# Patient Record
Sex: FEMALE | Race: BLACK OR AFRICAN AMERICAN | NOT HISPANIC OR LATINO | Employment: UNEMPLOYED | URBAN - METROPOLITAN AREA
[De-identification: names, ages, dates, MRNs, and addresses within clinical notes are randomized per-mention and may not be internally consistent; named-entity substitution may affect disease eponyms.]

---

## 2020-07-08 ENCOUNTER — HOSPITAL ENCOUNTER (INPATIENT)
Facility: HOSPITAL | Age: 45
LOS: 5 days | Discharge: HOME | DRG: 432 | End: 2020-07-13
Attending: EMERGENCY MEDICINE | Admitting: INTERNAL MEDICINE
Payer: MEDICAID

## 2020-07-08 DIAGNOSIS — N18.3 ACUTE RENAL FAILURE SUPERIMPOSED ON STAGE 3 CHRONIC KIDNEY DISEASE, UNSPECIFIED ACUTE RENAL FAILURE TYPE: ICD-10-CM

## 2020-07-08 DIAGNOSIS — N17.9 PRERENAL ACUTE RENAL FAILURE: ICD-10-CM

## 2020-07-08 DIAGNOSIS — J96.01 ACUTE RESPIRATORY FAILURE WITH HYPOXIA: ICD-10-CM

## 2020-07-08 DIAGNOSIS — K92.1 MELENA: ICD-10-CM

## 2020-07-08 DIAGNOSIS — N18.30 STAGE 3 CHRONIC KIDNEY DISEASE: ICD-10-CM

## 2020-07-08 DIAGNOSIS — B19.20 DECOMPENSATED HCV CIRRHOSIS: Primary | ICD-10-CM

## 2020-07-08 DIAGNOSIS — I85.11 SECONDARY ESOPHAGEAL VARICES WITH BLEEDING: ICD-10-CM

## 2020-07-08 DIAGNOSIS — G93.41 ENCEPHALOPATHY, METABOLIC: ICD-10-CM

## 2020-07-08 DIAGNOSIS — K74.69 DECOMPENSATED HCV CIRRHOSIS: Primary | ICD-10-CM

## 2020-07-08 DIAGNOSIS — E83.42 HYPOMAGNESEMIA: ICD-10-CM

## 2020-07-08 DIAGNOSIS — D64.9 SEVERE ANEMIA: ICD-10-CM

## 2020-07-08 DIAGNOSIS — D62 ACUTE BLOOD LOSS ANEMIA: ICD-10-CM

## 2020-07-08 DIAGNOSIS — N17.9 AKI (ACUTE KIDNEY INJURY): ICD-10-CM

## 2020-07-08 DIAGNOSIS — R79.89 ELEVATED BRAIN NATRIURETIC PEPTIDE (BNP) LEVEL: ICD-10-CM

## 2020-07-08 DIAGNOSIS — G93.40 ENCEPHALOPATHY: ICD-10-CM

## 2020-07-08 DIAGNOSIS — E87.8 ELECTROLYTE IMBALANCE: ICD-10-CM

## 2020-07-08 DIAGNOSIS — D68.9 COAGULOPATHY: ICD-10-CM

## 2020-07-08 DIAGNOSIS — E87.29 METABOLIC ACIDOSIS, INCREASED ANION GAP: ICD-10-CM

## 2020-07-08 DIAGNOSIS — F10.10 ALCOHOL ABUSE: Chronic | ICD-10-CM

## 2020-07-08 DIAGNOSIS — R06.02 SOB (SHORTNESS OF BREATH): ICD-10-CM

## 2020-07-08 DIAGNOSIS — N17.9 ACUTE RENAL FAILURE, UNSPECIFIED ACUTE RENAL FAILURE TYPE: ICD-10-CM

## 2020-07-08 DIAGNOSIS — N17.9 ACUTE RENAL FAILURE SUPERIMPOSED ON STAGE 3 CHRONIC KIDNEY DISEASE, UNSPECIFIED ACUTE RENAL FAILURE TYPE: ICD-10-CM

## 2020-07-08 DIAGNOSIS — J81.0 ACUTE PULMONARY EDEMA: ICD-10-CM

## 2020-07-08 DIAGNOSIS — K92.1 GASTROINTESTINAL HEMORRHAGE WITH MELENA: ICD-10-CM

## 2020-07-08 LAB
ALBUMIN SERPL BCP-MCNC: 2.2 G/DL (ref 3.5–5.2)
ALP SERPL-CCNC: 154 U/L (ref 55–135)
ALT SERPL W/O P-5'-P-CCNC: 44 U/L (ref 10–44)
ANION GAP SERPL CALC-SCNC: 23 MMOL/L (ref 8–16)
APTT BLDCRRT: 34.7 SEC (ref 21–32)
AST SERPL-CCNC: 234 U/L (ref 10–40)
BILIRUB SERPL-MCNC: 14.9 MG/DL (ref 0.1–1)
BILIRUB UR QL STRIP: ABNORMAL
BNP SERPL-MCNC: 2829 PG/ML (ref 0–99)
BUN SERPL-MCNC: 22 MG/DL (ref 6–20)
CALCIUM SERPL-MCNC: 9.2 MG/DL (ref 8.7–10.5)
CHLORIDE SERPL-SCNC: 103 MMOL/L (ref 95–110)
CLARITY UR: CLEAR
CO2 SERPL-SCNC: 8 MMOL/L (ref 23–29)
COLOR UR: YELLOW
CREAT SERPL-MCNC: 1.6 MG/DL (ref 0.5–1.4)
EST. GFR  (AFRICAN AMERICAN): 45 ML/MIN/1.73 M^2
EST. GFR  (NON AFRICAN AMERICAN): 39 ML/MIN/1.73 M^2
GLUCOSE SERPL-MCNC: 84 MG/DL (ref 70–110)
GLUCOSE UR QL STRIP: NEGATIVE
HGB UR QL STRIP: NEGATIVE
HIV 1+2 AB+HIV1 P24 AG SERPL QL IA: NEGATIVE
INR PPP: 2 (ref 0.8–1.2)
KETONES UR QL STRIP: ABNORMAL
LEUKOCYTE ESTERASE UR QL STRIP: NEGATIVE
NITRITE UR QL STRIP: NEGATIVE
PH UR STRIP: 6 [PH] (ref 5–8)
POTASSIUM SERPL-SCNC: 4 MMOL/L (ref 3.5–5.1)
PROT SERPL-MCNC: 9.2 G/DL (ref 6–8.4)
PROT UR QL STRIP: NEGATIVE
PROTHROMBIN TIME: 20.5 SEC (ref 9–12.5)
SARS-COV-2 RDRP RESP QL NAA+PROBE: NEGATIVE
SODIUM SERPL-SCNC: 134 MMOL/L (ref 136–145)
SP GR UR STRIP: 1.02 (ref 1–1.03)
TROPONIN I SERPL DL<=0.01 NG/ML-MCNC: 0.09 NG/ML (ref 0–0.03)
URN SPEC COLLECT METH UR: ABNORMAL
UROBILINOGEN UR STRIP-ACNC: >=8 EU/DL

## 2020-07-08 PROCEDURE — 85014 HEMATOCRIT: CPT

## 2020-07-08 PROCEDURE — 93005 ELECTROCARDIOGRAM TRACING: CPT

## 2020-07-08 PROCEDURE — 82272 OCCULT BLD FECES 1-3 TESTS: CPT

## 2020-07-08 PROCEDURE — 36600 WITHDRAWAL OF ARTERIAL BLOOD: CPT

## 2020-07-08 PROCEDURE — 36430 TRANSFUSION BLD/BLD COMPNT: CPT

## 2020-07-08 PROCEDURE — 82565 ASSAY OF CREATININE: CPT

## 2020-07-08 PROCEDURE — 82803 BLOOD GASES ANY COMBINATION: CPT

## 2020-07-08 PROCEDURE — 99291 CRITICAL CARE FIRST HOUR: CPT | Mod: 25

## 2020-07-08 PROCEDURE — 86901 BLOOD TYPING SEROLOGIC RH(D): CPT

## 2020-07-08 PROCEDURE — 83880 ASSAY OF NATRIURETIC PEPTIDE: CPT

## 2020-07-08 PROCEDURE — 84484 ASSAY OF TROPONIN QUANT: CPT

## 2020-07-08 PROCEDURE — 85610 PROTHROMBIN TIME: CPT

## 2020-07-08 PROCEDURE — 96374 THER/PROPH/DIAG INJ IV PUSH: CPT

## 2020-07-08 PROCEDURE — 93010 ELECTROCARDIOGRAM REPORT: CPT | Mod: ,,, | Performed by: INTERNAL MEDICINE

## 2020-07-08 PROCEDURE — 80053 COMPREHEN METABOLIC PANEL: CPT

## 2020-07-08 PROCEDURE — 82330 ASSAY OF CALCIUM: CPT

## 2020-07-08 PROCEDURE — 63600175 PHARM REV CODE 636 W HCPCS: Performed by: EMERGENCY MEDICINE

## 2020-07-08 PROCEDURE — 81003 URINALYSIS AUTO W/O SCOPE: CPT

## 2020-07-08 PROCEDURE — 85730 THROMBOPLASTIN TIME PARTIAL: CPT

## 2020-07-08 PROCEDURE — 93010 EKG 12-LEAD: ICD-10-PCS | Mod: ,,, | Performed by: INTERNAL MEDICINE

## 2020-07-08 PROCEDURE — 84295 ASSAY OF SERUM SODIUM: CPT

## 2020-07-08 PROCEDURE — U0002 COVID-19 LAB TEST NON-CDC: HCPCS

## 2020-07-08 PROCEDURE — 99900035 HC TECH TIME PER 15 MIN (STAT)

## 2020-07-08 PROCEDURE — 84132 ASSAY OF SERUM POTASSIUM: CPT

## 2020-07-08 PROCEDURE — 86920 COMPATIBILITY TEST SPIN: CPT

## 2020-07-08 PROCEDURE — 86703 HIV-1/HIV-2 1 RESULT ANTBDY: CPT

## 2020-07-08 PROCEDURE — 20000000 HC ICU ROOM

## 2020-07-08 RX ORDER — HYDROCODONE BITARTRATE AND ACETAMINOPHEN 500; 5 MG/1; MG/1
TABLET ORAL
Status: DISCONTINUED | OUTPATIENT
Start: 2020-07-09 | End: 2020-07-09

## 2020-07-08 RX ORDER — HYDROCODONE BITARTRATE AND ACETAMINOPHEN 500; 5 MG/1; MG/1
TABLET ORAL
Status: DISCONTINUED | OUTPATIENT
Start: 2020-07-09 | End: 2020-07-11

## 2020-07-08 RX ORDER — SPIRONOLACTONE 50 MG/1
50 TABLET, FILM COATED ORAL DAILY
COMMUNITY
End: 2020-08-04

## 2020-07-08 RX ORDER — PANTOPRAZOLE SODIUM 40 MG/1
40 TABLET, DELAYED RELEASE ORAL DAILY
COMMUNITY

## 2020-07-08 RX ORDER — SERTRALINE HYDROCHLORIDE 25 MG/1
25 TABLET, FILM COATED ORAL DAILY
COMMUNITY

## 2020-07-08 RX ORDER — FUROSEMIDE 40 MG/1
40 TABLET ORAL 2 TIMES DAILY
COMMUNITY
End: 2020-08-04

## 2020-07-08 RX ORDER — FOLIC ACID 1 MG/1
1 TABLET ORAL DAILY
COMMUNITY
End: 2020-07-20 | Stop reason: SDUPTHER

## 2020-07-08 RX ORDER — FUROSEMIDE 10 MG/ML
60 INJECTION INTRAMUSCULAR; INTRAVENOUS
Status: COMPLETED | OUTPATIENT
Start: 2020-07-08 | End: 2020-07-08

## 2020-07-08 RX ADMIN — FUROSEMIDE 60 MG: 10 INJECTION, SOLUTION INTRAMUSCULAR; INTRAVENOUS at 10:07

## 2020-07-08 NOTE — ED PROVIDER NOTES
SCRIBE #1 NOTE: I, Rafael Thomason, am scribing for, and in the presence of, Magdaleno Oviedo MD. I have scribed the HPI, ROS, and PEx.     SCRIBE #2 NOTE: I, Ember Peng/Odessa Vinson, am scribing for, and in the presence of,  Lenny Robert MD. I have scribed the remaining portions of the note not scribed by Scribe #1.      History     Chief Complaint   Patient presents with    Shortness of Breath     Weakness, history of ascites per EMS     Review of patient's allergies indicates:  No Known Allergies      History of Present Illness     HPI    2020, 6:47 PM  History obtained from the patient      History of Present Illness: John Wray is a 44 y.o. female patient with a PMHx of ascites, alcohol abuse, depression, hepatitis C, and HTN who presents to the Emergency Department for evaluation of shortness of breath which onset gradually 3 days PTA. EMS reports pt having a hx of ascites. Pt reports having dark black stool for the past 2 days. Symptoms are constant and moderate in severity. No mitigating factors reported. Pt reports sx exacerbation with exertion. Associated sxs include abdominal distention and generalized weakness. Patient denies any fever, chills, HA, dizziness, N/V, and all other sxs at this time. No prior Tx reported. No further complaints or concerns at this time.       Arrival mode: EMS    PCP: Primary Doctor No        Past Medical History:  Ascites    Past Surgical History:  Past Surgical History:   Procedure Laterality Date     SECTION      ESOPHAGOGASTRODUODENOSCOPY           Family History:  History reviewed. No pertinent family history.    Social History:  Social History     Tobacco Use    Smoking status: Current Every Day Smoker     Packs/day: 0.50   Substance and Sexual Activity    Alcohol use: Not Currently    Drug use: Never    Sexual activity: Unknown        Review of Systems     Review of Systems   Constitutional: Negative for chills and fever.   HENT: Negative for  sore throat.    Respiratory: Positive for shortness of breath. Negative for cough.    Cardiovascular: Negative for chest pain and leg swelling.   Gastrointestinal: Positive for abdominal distention. Negative for abdominal pain, diarrhea, nausea and vomiting.   Genitourinary: Negative for dysuria.   Musculoskeletal: Negative for back pain, neck pain and neck stiffness.   Skin: Negative for rash and wound.   Neurological: Positive for weakness (Generalized). Negative for dizziness, light-headedness, numbness and headaches.   Hematological: Does not bruise/bleed easily.   All other systems reviewed and are negative.     Physical Exam     Initial Vitals [07/08/20 1835]   BP Pulse Resp Temp SpO2   (!) 159/84 105 18 98 °F (36.7 °C) 97 %      MAP       --          Physical Exam  Nursing Notes and Vital Signs Reviewed.  Constitutional: Patient is in no acute distress. Well-developed and well-nourished.  Head: Atraumatic. Normocephalic.  Eyes: PERRL. EOM intact. Conjunctivae are not pale. No scleral icterus.  ENT: Mucous membranes are moist. Oropharynx is clear and symmetric.    Neck: Supple. Full ROM.  Cardiovascular: Regular rate. Regular rhythm. No murmurs, rubs, or gallops. Distal pulses are 2+ and symmetric.  Pulmonary/Chest: No respiratory distress. Clear to auscultation bilaterally. No wheezing or rales.  Abdominal: Ascitic and firm. There is no tenderness to palpation. No rebound or guarding.  Genitourinary: No CVA tenderness  Musculoskeletal: Moves all extremities. No obvious deformities. No edema. No calf tenderness.  Skin: Warm and dry.  Neurological:  Alert, awake, and appropriate.  Normal speech.  No acute focal neurological deficits are appreciated.  Psychiatric: Normal affect. Good eye contact. Appropriate in content.     ED Course   Critical Care    Date/Time: 7/8/2020 11:48 PM  Performed by: Lenny Robert MD  Authorized by: Lenny Robert MD   Direct patient critical care time: 15  minutes  Additional history critical care time: 5 minutes  Ordering / reviewing critical care time: 8 minutes  Documentation critical care time: 7 minutes  Consulting other physicians critical care time: 5 minutes  Consult with family critical care time: 5 minutes  Total critical care time (exclusive of procedural time) : 45 minutes  Critical care time was exclusive of separately billable procedures and treating other patients and teaching time.  Critical care was necessary to treat or prevent imminent or life-threatening deterioration of the following conditions: severe anemia and multi-organ failure.  Critical care was time spent personally by me on the following activities: blood draw for specimens, development of treatment plan with patient or surrogate, discussions with consultants, interpretation of cardiac output measurements, evaluation of patient's response to treatment, examination of patient, obtaining history from patient or surrogate, ordering and performing treatments and interventions, ordering and review of laboratory studies, ordering and review of radiographic studies, re-evaluation of patient's condition, review of old charts and pulse oximetry.        ED Vital Signs:  Vitals:    07/11/20 0305 07/11/20 0330 07/11/20 0400 07/11/20 0430   BP: (!) 127/56 (!) 129/56 (!) 126/54 (!) 129/54   Pulse: 64 65 64 66   Resp: (!) 22 (!) 22 (!) 22 (!) 22   Temp: 98.4 °F (36.9 °C)      TempSrc: Oral      SpO2: 100% 100% 100% 98%   Weight:       Height:        07/11/20 0442 07/11/20 0450 07/11/20 0500 07/11/20 0505   BP:   (!) 121/54    Pulse: 66 67 67 67   Resp: 17 (!) 22 (!) 22    Temp:       TempSrc:       SpO2: 98% 98% 98%    Weight:       Height:        07/11/20 0530 07/11/20 0600 07/11/20 0630 07/11/20 0705   BP: (!) 121/54 (!) 125/56 (!) 133/57 131/60   Pulse: 67 66 69 70   Resp: (!) 22 (!) 22 (!) 22 (!) 22   Temp:    100.3 °F (37.9 °C)   TempSrc:    Oral   SpO2: 99% 99% 99% 100%   Weight:  90.7 kg (199 lb  15.3 oz)     Height:        07/11/20 0715 07/11/20 0805 07/11/20 0905   BP:  (!) 151/67 (!) 146/64   Pulse: 69 82 89   Resp: (!) 22 16 (!) 23   Temp:      TempSrc:      SpO2: 100% 98% (!) 94%   Weight:      Height:          Abnormal Lab Results:  Labs Reviewed   COMPREHENSIVE METABOLIC PANEL - Abnormal; Notable for the following components:       Result Value    Sodium 134 (*)     CO2 8 (*)     BUN, Bld 22 (*)     Creatinine 1.6 (*)     Total Protein 9.2 (*)     Albumin 2.2 (*)     Total Bilirubin 14.9 (*)     Alkaline Phosphatase 154 (*)      (*)     Anion Gap 23 (*)     eGFR if  45 (*)     eGFR if non  39 (*)     All other components within normal limits    Narrative:      CO2  critical result(s) called and verbal readback obtained from   Tacho Rios RN by [email or web address] 07/08/2020 19:38   B-TYPE NATRIURETIC PEPTIDE - Abnormal; Notable for the following components:    BNP 2,829 (*)     All other components within normal limits   TROPONIN I - Abnormal; Notable for the following components:    Troponin I 0.092 (*)     All other components within normal limits   URINALYSIS, REFLEX TO URINE CULTURE - Abnormal; Notable for the following components:    Ketones, UA Trace (*)     Bilirubin (UA) 3+ (*)     Urobilinogen, UA >=8.0 (*)     All other components within normal limits    Narrative:     Specimen Source->Urine   PROTIME-INR - Abnormal; Notable for the following components:    Prothrombin Time 20.5 (*)     INR 2.0 (*)     All other components within normal limits   APTT - Abnormal; Notable for the following components:    aPTT 34.7 (*)     All other components within normal limits   OCCULT BLOOD X 1, STOOL - Abnormal; Notable for the following components:    Occult Blood Positive (*)     All other components within normal limits   HIV 1 / 2 ANTIBODY   SARS-COV-2 RNA AMPLIFICATION, QUAL   PATHOLOGIST INTERPRETATION DIFFERENTIAL   TYPE & SCREEN   PREPARE RBC SOFT   PREPARE FRESH FROZEN  PLASMA SOFT        All Lab Results:  Results for orders placed or performed during the hospital encounter of 07/08/20   Blood culture    Specimen: Blood   Result Value Ref Range    Blood Culture, Routine No Growth to date     Blood Culture, Routine No Growth to date    HIV 1/2 Ag/Ab (4th Gen)   Result Value Ref Range    HIV 1/2 Ag/Ab Negative Negative   Comprehensive metabolic panel   Result Value Ref Range    Sodium 134 (L) 136 - 145 mmol/L    Potassium 4.0 3.5 - 5.1 mmol/L    Chloride 103 95 - 110 mmol/L    CO2 8 (LL) 23 - 29 mmol/L    Glucose 84 70 - 110 mg/dL    BUN, Bld 22 (H) 6 - 20 mg/dL    Creatinine 1.6 (H) 0.5 - 1.4 mg/dL    Calcium 9.2 8.7 - 10.5 mg/dL    Total Protein 9.2 (H) 6.0 - 8.4 g/dL    Albumin 2.2 (L) 3.5 - 5.2 g/dL    Total Bilirubin 14.9 (H) 0.1 - 1.0 mg/dL    Alkaline Phosphatase 154 (H) 55 - 135 U/L     (H) 10 - 40 U/L    ALT 44 10 - 44 U/L    Anion Gap 23 (H) 8 - 16 mmol/L    eGFR if African American 45 (A) >60 mL/min/1.73 m^2    eGFR if non African American 39 (A) >60 mL/min/1.73 m^2   Brain natriuretic peptide   Result Value Ref Range    BNP 2,829 (H) 0 - 99 pg/mL   Troponin I   Result Value Ref Range    Troponin I 0.092 (H) 0.000 - 0.026 ng/mL   Urinalysis, Reflex to Urine Culture Urine, Clean Catch    Specimen: Urine   Result Value Ref Range    Specimen UA Urine, Catheterized     Color, UA Yellow Yellow, Straw, Margarita    Appearance, UA Clear Clear    pH, UA 6.0 5.0 - 8.0    Specific Gravity, UA 1.020 1.005 - 1.030    Protein, UA Negative Negative    Glucose, UA Negative Negative    Ketones, UA Trace (A) Negative    Bilirubin (UA) 3+ (A) Negative    Occult Blood UA Negative Negative    Nitrite, UA Negative Negative    Urobilinogen, UA >=8.0 (A) <2.0 EU/dL    Leukocytes, UA Negative Negative   COVID-19 Rapid Screening   Result Value Ref Range    SARS-CoV-2 RNA, Amplification, Qual Negative Negative   Protime-INR   Result Value Ref Range    Prothrombin Time 20.5 (H) 9.0 - 12.5 sec     INR 2.0 (H) 0.8 - 1.2   APTT   Result Value Ref Range    aPTT 34.7 (H) 21.0 - 32.0 sec   Occult blood x 1, stool   Result Value Ref Range    Occult Blood Positive (A) Negative   CBC auto differential   Result Value Ref Range    WBC 14.63 (H) 3.90 - 12.70 K/uL    RBC 1.12 (L) 4.00 - 5.40 M/uL    Hemoglobin 3.8 (LL) 12.0 - 16.0 g/dL    Hematocrit 12.9 (LL) 37.0 - 48.5 %    Mean Corpuscular Volume 115 (H) 82 - 98 fL    Mean Corpuscular Hemoglobin 33.9 (H) 27.0 - 31.0 pg    Mean Corpuscular Hemoglobin Conc 29.5 (L) 32.0 - 36.0 g/dL    RDW 23.9 (H) 11.5 - 14.5 %    Platelets 49 (L) 150 - 350 K/uL    MPV 10.9 9.2 - 12.9 fL    Immature Granulocytes 1.3 (H) 0.0 - 0.5 %    Gran # (ANC) 12.4 (H) 1.8 - 7.7 K/uL    Immature Grans (Abs) 0.19 (H) 0.00 - 0.04 K/uL    Lymph # 0.9 (L) 1.0 - 4.8 K/uL    Mono # 1.1 (H) 0.3 - 1.0 K/uL    Eos # 0.0 0.0 - 0.5 K/uL    Baso # 0.01 0.00 - 0.20 K/uL    nRBC 5 (A) 0 /100 WBC    Gran% 84.8 (H) 38.0 - 73.0 %    Lymph% 6.1 (L) 18.0 - 48.0 %    Mono% 7.6 4.0 - 15.0 %    Eosinophil% 0.1 0.0 - 8.0 %    Basophil% 0.1 0.0 - 1.9 %    Platelet Estimate Decreased (A)     Aniso Moderate     Poik Moderate     Poly Occasional     Hypo Marked     Target Cells Occasional     Zev Cells Occasional     Differential Method Automated    Magnesium   Result Value Ref Range    Magnesium 1.1 (L) 1.6 - 2.6 mg/dL   Phosphorus   Result Value Ref Range    Phosphorus 4.7 (H) 2.7 - 4.5 mg/dL   Comprehensive metabolic panel   Result Value Ref Range    Sodium 136 136 - 145 mmol/L    Potassium 4.5 3.5 - 5.1 mmol/L    Chloride 103 95 - 110 mmol/L    CO2 10 (L) 23 - 29 mmol/L    Glucose 93 70 - 110 mg/dL    BUN, Bld 25 (H) 6 - 20 mg/dL    Creatinine 2.1 (H) 0.5 - 1.4 mg/dL    Calcium 9.0 8.7 - 10.5 mg/dL    Total Protein 9.2 (H) 6.0 - 8.4 g/dL    Albumin 2.4 (L) 3.5 - 5.2 g/dL    Total Bilirubin 14.9 (H) 0.1 - 1.0 mg/dL    Alkaline Phosphatase 135 55 - 135 U/L     (H) 10 - 40 U/L    ALT 42 10 - 44 U/L    Anion Gap 23  (H) 8 - 16 mmol/L    eGFR if African American 33 (A) >60 mL/min/1.73 m^2    eGFR if non African American 28 (A) >60 mL/min/1.73 m^2   Ammonia   Result Value Ref Range    Ammonia 65 (H) 10 - 50 umol/L   Bilirubin, direct   Result Value Ref Range    Bilirubin, Direct 10.1 (H) 0.1 - 0.3 mg/dL   D dimer, quantitative   Result Value Ref Range    D-Dimer 12.25 (H) <0.50 mg/L FEU   Fibrinogen   Result Value Ref Range    Fibrinogen 157 (L) 182 - 366 mg/dL   Folate   Result Value Ref Range    Folate 4.0 4.0 - 24.0 ng/mL   Vitamin B12   Result Value Ref Range    Vitamin B-12 906 210 - 950 pg/mL   Protime-INR   Result Value Ref Range    Prothrombin Time 21.6 (H) 9.0 - 12.5 sec    INR 2.1 (H) 0.8 - 1.2   Ethanol   Result Value Ref Range    Alcohol, Medical, Serum <10 <10 mg/dL   Hepatitis B Surface Ab, Qualitative   Result Value Ref Range    Hep B S Ab Negative    Hepatitis panel, acute   Result Value Ref Range    Hepatitis B Surface Ag Negative Negative    Hep B C IgM Negative Negative    Hep A IgM Negative Negative    Hepatitis C Ab Negative Negative   Haptoglobin   Result Value Ref Range    Haptoglobin <10 (L) 30 - 250 mg/dL   TSH   Result Value Ref Range    TSH 2.277 0.400 - 4.000 uIU/mL   Hematocrit   Result Value Ref Range    Hematocrit 18.3 (LL) 37.0 - 48.5 %   Hemoglobin   Result Value Ref Range    Hemoglobin 6.6 (L) 12.0 - 16.0 g/dL   Basic metabolic panel   Result Value Ref Range    Sodium 136 136 - 145 mmol/L    Potassium 4.1 3.5 - 5.1 mmol/L    Chloride 103 95 - 110 mmol/L    CO2 19 (L) 23 - 29 mmol/L    Glucose 96 70 - 110 mg/dL    BUN, Bld 36 (H) 6 - 20 mg/dL    Creatinine 2.2 (H) 0.5 - 1.4 mg/dL    Calcium 9.3 8.7 - 10.5 mg/dL    Anion Gap 14 8 - 16 mmol/L    eGFR if African American 31 (A) >60 mL/min/1.73 m^2    eGFR if non African American 27 (A) >60 mL/min/1.73 m^2   Magnesium   Result Value Ref Range    Magnesium 1.9 1.6 - 2.6 mg/dL   Basic metabolic panel   Result Value Ref Range    Sodium 137 136 - 145  mmol/L    Potassium 4.0 3.5 - 5.1 mmol/L    Chloride 102 95 - 110 mmol/L    CO2 20 (L) 23 - 29 mmol/L    Glucose 117 (H) 70 - 110 mg/dL    BUN, Bld 42 (H) 6 - 20 mg/dL    Creatinine 2.4 (H) 0.5 - 1.4 mg/dL    Calcium 9.4 8.7 - 10.5 mg/dL    Anion Gap 15 8 - 16 mmol/L    eGFR if African American 28 (A) >60 mL/min/1.73 m^2    eGFR if non African American 24 (A) >60 mL/min/1.73 m^2   Magnesium   Result Value Ref Range    Magnesium 1.9 1.6 - 2.6 mg/dL   CBC auto differential   Result Value Ref Range    WBC 9.74 3.90 - 12.70 K/uL    RBC 2.13 (L) 4.00 - 5.40 M/uL    Hemoglobin 6.9 (L) 12.0 - 16.0 g/dL    Hematocrit 20.9 (L) 37.0 - 48.5 %    Mean Corpuscular Volume 98 82 - 98 fL    Mean Corpuscular Hemoglobin 32.4 (H) 27.0 - 31.0 pg    Mean Corpuscular Hemoglobin Conc 33.0 32.0 - 36.0 g/dL    RDW 22.8 (H) 11.5 - 14.5 %    Platelets 49 (L) 150 - 350 K/uL    MPV 11.0 9.2 - 12.9 fL    Immature Granulocytes 1.5 (H) 0.0 - 0.5 %    Gran # (ANC) 7.9 (H) 1.8 - 7.7 K/uL    Immature Grans (Abs) 0.15 (H) 0.00 - 0.04 K/uL    Lymph # 0.6 (L) 1.0 - 4.8 K/uL    Mono # 1.0 0.3 - 1.0 K/uL    Eos # 0.1 0.0 - 0.5 K/uL    Baso # 0.02 0.00 - 0.20 K/uL    nRBC 8 (A) 0 /100 WBC    Gran% 81.5 (H) 38.0 - 73.0 %    Lymph% 5.7 (L) 18.0 - 48.0 %    Mono% 9.8 4.0 - 15.0 %    Eosinophil% 1.3 0.0 - 8.0 %    Basophil% 0.2 0.0 - 1.9 %    Platelet Estimate Decreased (A)     Aniso Slight     Poik Moderate     Poly Occasional     Hypo Moderate     Target Cells Occasional     Tear Drop Cells Occasional     Zev Cells Occasional     Stomatocytes Present     Differential Method Automated    Magnesium   Result Value Ref Range    Magnesium 1.8 1.6 - 2.6 mg/dL   Phosphorus   Result Value Ref Range    Phosphorus 3.5 2.7 - 4.5 mg/dL   Protime-INR   Result Value Ref Range    Prothrombin Time 16.6 (H) 9.0 - 12.5 sec    INR 1.6 (H) 0.8 - 1.2   Basic metabolic panel   Result Value Ref Range    Sodium 137 136 - 145 mmol/L    Potassium 3.9 3.5 - 5.1 mmol/L    Chloride 102  95 - 110 mmol/L    CO2 20 (L) 23 - 29 mmol/L    Glucose 114 (H) 70 - 110 mg/dL    BUN, Bld 43 (H) 6 - 20 mg/dL    Creatinine 2.5 (H) 0.5 - 1.4 mg/dL    Calcium 9.5 8.7 - 10.5 mg/dL    Anion Gap 15 8 - 16 mmol/L    eGFR if African American 26 (A) >60 mL/min/1.73 m^2    eGFR if non African American 23 (A) >60 mL/min/1.73 m^2   Magnesium   Result Value Ref Range    Magnesium 1.9 1.6 - 2.6 mg/dL   CBC auto differential   Result Value Ref Range    WBC 8.86 3.90 - 12.70 K/uL    RBC 2.09 (L) 4.00 - 5.40 M/uL    Hemoglobin 6.7 (L) 12.0 - 16.0 g/dL    Hematocrit 20.6 (L) 37.0 - 48.5 %    Mean Corpuscular Volume 99 (H) 82 - 98 fL    Mean Corpuscular Hemoglobin 32.1 (H) 27.0 - 31.0 pg    Mean Corpuscular Hemoglobin Conc 32.5 32.0 - 36.0 g/dL    RDW 23.5 (H) 11.5 - 14.5 %    Platelets 59 (L) 150 - 350 K/uL    MPV 11.3 9.2 - 12.9 fL    Immature Granulocytes 1.6 (H) 0.0 - 0.5 %    Gran # (ANC) 7.2 1.8 - 7.7 K/uL    Immature Grans (Abs) 0.14 (H) 0.00 - 0.04 K/uL    Lymph # 0.7 (L) 1.0 - 4.8 K/uL    Mono # 0.7 0.3 - 1.0 K/uL    Eos # 0.2 0.0 - 0.5 K/uL    Baso # 0.03 0.00 - 0.20 K/uL    nRBC 8 (A) 0 /100 WBC    Gran% 81.2 (H) 38.0 - 73.0 %    Lymph% 7.4 (L) 18.0 - 48.0 %    Mono% 7.7 4.0 - 15.0 %    Eosinophil% 1.8 0.0 - 8.0 %    Basophil% 0.3 0.0 - 1.9 %    Platelet Estimate Decreased (A)     Aniso Slight     Poik Slight     Poly Occasional     Hypo Occasional     Ovalocytes Occasional     Target Cells Occasional     Tear Drop Cells Occasional     Differential Method Automated    Comprehensive metabolic panel   Result Value Ref Range    Sodium 137 136 - 145 mmol/L    Potassium 4.0 3.5 - 5.1 mmol/L    Chloride 103 95 - 110 mmol/L    CO2 20 (L) 23 - 29 mmol/L    Glucose 113 (H) 70 - 110 mg/dL    BUN, Bld 43 (H) 6 - 20 mg/dL    Creatinine 2.4 (H) 0.5 - 1.4 mg/dL    Calcium 9.2 8.7 - 10.5 mg/dL    Total Protein 9.2 (H) 6.0 - 8.4 g/dL    Albumin 2.8 (L) 3.5 - 5.2 g/dL    Total Bilirubin 19.8 (H) 0.1 - 1.0 mg/dL    Alkaline Phosphatase  122 55 - 135 U/L     (H) 10 - 40 U/L    ALT 44 10 - 44 U/L    Anion Gap 14 8 - 16 mmol/L    eGFR if African American 28 (A) >60 mL/min/1.73 m^2    eGFR if non African American 24 (A) >60 mL/min/1.73 m^2   Ammonia   Result Value Ref Range    Ammonia 152 (H) 10 - 50 umol/L   APTT   Result Value Ref Range    aPTT 30.0 21.0 - 32.0 sec   Haptoglobin   Result Value Ref Range    Haptoglobin 12 (L) 30 - 250 mg/dL   Hemoglobin   Result Value Ref Range    Hemoglobin 8.3 (L) 12.0 - 16.0 g/dL   Basic metabolic panel   Result Value Ref Range    Sodium 137 136 - 145 mmol/L    Potassium 3.5 3.5 - 5.1 mmol/L    Chloride 104 95 - 110 mmol/L    CO2 21 (L) 23 - 29 mmol/L    Glucose 120 (H) 70 - 110 mg/dL    BUN, Bld 47 (H) 6 - 20 mg/dL    Creatinine 2.0 (H) 0.5 - 1.4 mg/dL    Calcium 9.1 8.7 - 10.5 mg/dL    Anion Gap 12 8 - 16 mmol/L    eGFR if African American 34 (A) >60 mL/min/1.73 m^2    eGFR if non African American 30 (A) >60 mL/min/1.73 m^2   Magnesium   Result Value Ref Range    Magnesium 2.0 1.6 - 2.6 mg/dL   Sodium, urine, random   Result Value Ref Range    Sodium Urine Random 46 20 - 250 mmol/L   Creatinine, urine, random   Result Value Ref Range    Creatinine, Random Ur 84.2 15.0 - 325.0 mg/dL   Fibrinogen   Result Value Ref Range    Fibrinogen 306 182 - 366 mg/dL   Protime-INR   Result Value Ref Range    Prothrombin Time 16.5 (H) 9.0 - 12.5 sec    INR 1.6 (H) 0.8 - 1.2   CBC auto differential   Result Value Ref Range    WBC 8.63 3.90 - 12.70 K/uL    RBC 2.46 (L) 4.00 - 5.40 M/uL    Hemoglobin 7.7 (L) 12.0 - 16.0 g/dL    Hematocrit 23.5 (L) 37.0 - 48.5 %    Mean Corpuscular Volume 96 82 - 98 fL    Mean Corpuscular Hemoglobin 31.3 (H) 27.0 - 31.0 pg    Mean Corpuscular Hemoglobin Conc 32.8 32.0 - 36.0 g/dL    RDW 23.9 (H) 11.5 - 14.5 %    Platelets 53 (L) 150 - 350 K/uL    MPV 11.9 9.2 - 12.9 fL    Immature Granulocytes 1.7 (H) 0.0 - 0.5 %    Gran # (ANC) 7.0 1.8 - 7.7 K/uL    Immature Grans (Abs) 0.15 (H) 0.00 - 0.04  K/uL    Lymph # 0.4 (L) 1.0 - 4.8 K/uL    Mono # 0.9 0.3 - 1.0 K/uL    Eos # 0.2 0.0 - 0.5 K/uL    Baso # 0.02 0.00 - 0.20 K/uL    nRBC 5 (A) 0 /100 WBC    Gran% 81.0 (H) 38.0 - 73.0 %    Lymph% 4.9 (L) 18.0 - 48.0 %    Mono% 10.2 4.0 - 15.0 %    Eosinophil% 2.0 0.0 - 8.0 %    Basophil% 0.2 0.0 - 1.9 %    Differential Method Automated    CBC auto differential   Result Value Ref Range    WBC 8.63 3.90 - 12.70 K/uL    RBC 2.46 (L) 4.00 - 5.40 M/uL    Hemoglobin 7.7 (L) 12.0 - 16.0 g/dL    Hematocrit 23.5 (L) 37.0 - 48.5 %    Mean Corpuscular Volume 96 82 - 98 fL    Mean Corpuscular Hemoglobin 31.3 (H) 27.0 - 31.0 pg    Mean Corpuscular Hemoglobin Conc 32.8 32.0 - 36.0 g/dL    RDW 23.9 (H) 11.5 - 14.5 %    Platelets 53 (L) 150 - 350 K/uL    MPV 11.9 9.2 - 12.9 fL    Immature Granulocytes 1.7 (H) 0.0 - 0.5 %    Gran # (ANC) 7.0 1.8 - 7.7 K/uL    Immature Grans (Abs) 0.15 (H) 0.00 - 0.04 K/uL    Lymph # 0.4 (L) 1.0 - 4.8 K/uL    Mono # 0.9 0.3 - 1.0 K/uL    Eos # 0.2 0.0 - 0.5 K/uL    Baso # 0.02 0.00 - 0.20 K/uL    nRBC 5 (A) 0 /100 WBC    Gran% 81.0 (H) 38.0 - 73.0 %    Lymph% 4.9 (L) 18.0 - 48.0 %    Mono% 10.2 4.0 - 15.0 %    Eosinophil% 2.0 0.0 - 8.0 %    Basophil% 0.2 0.0 - 1.9 %    Differential Method Automated    Basic metabolic panel   Result Value Ref Range    Sodium 137 136 - 145 mmol/L    Potassium 3.7 3.5 - 5.1 mmol/L    Chloride 104 95 - 110 mmol/L    CO2 21 (L) 23 - 29 mmol/L    Glucose 121 (H) 70 - 110 mg/dL    BUN, Bld 48 (H) 6 - 20 mg/dL    Creatinine 2.2 (H) 0.5 - 1.4 mg/dL    Calcium 9.2 8.7 - 10.5 mg/dL    Anion Gap 12 8 - 16 mmol/L    eGFR if African American 31 (A) >60 mL/min/1.73 m^2    eGFR if non African American 27 (A) >60 mL/min/1.73 m^2   Magnesium   Result Value Ref Range    Magnesium 2.0 1.6 - 2.6 mg/dL   Hemoglobin   Result Value Ref Range    Hemoglobin 8.5 (L) 12.0 - 16.0 g/dL   Basic metabolic panel   Result Value Ref Range    Sodium 138 136 - 145 mmol/L    Potassium 3.3 (L) 3.5 - 5.1  mmol/L    Chloride 104 95 - 110 mmol/L    CO2 21 (L) 23 - 29 mmol/L    Glucose 105 70 - 110 mg/dL    BUN, Bld 52 (H) 6 - 20 mg/dL    Creatinine 2.2 (H) 0.5 - 1.4 mg/dL    Calcium 9.0 8.7 - 10.5 mg/dL    Anion Gap 13 8 - 16 mmol/L    eGFR if African American 31 (A) >60 mL/min/1.73 m^2    eGFR if non African American 27 (A) >60 mL/min/1.73 m^2   Magnesium   Result Value Ref Range    Magnesium 2.0 1.6 - 2.6 mg/dL   Protime-INR   Result Value Ref Range    Prothrombin Time 17.2 (H) 9.0 - 12.5 sec    INR 1.7 (H) 0.8 - 1.2   Hemoglobin   Result Value Ref Range    Hemoglobin 8.3 (L) 12.0 - 16.0 g/dL   Basic metabolic panel   Result Value Ref Range    Sodium 139 136 - 145 mmol/L    Potassium 3.4 (L) 3.5 - 5.1 mmol/L    Chloride 105 95 - 110 mmol/L    CO2 21 (L) 23 - 29 mmol/L    Glucose 93 70 - 110 mg/dL    BUN, Bld 54 (H) 6 - 20 mg/dL    Creatinine 2.2 (H) 0.5 - 1.4 mg/dL    Calcium 9.2 8.7 - 10.5 mg/dL    Anion Gap 13 8 - 16 mmol/L    eGFR if African American 31 (A) >60 mL/min/1.73 m^2    eGFR if non African American 27 (A) >60 mL/min/1.73 m^2   Magnesium   Result Value Ref Range    Magnesium 2.0 1.6 - 2.6 mg/dL   CBC auto differential   Result Value Ref Range    WBC 7.55 3.90 - 12.70 K/uL    RBC 2.60 (L) 4.00 - 5.40 M/uL    Hemoglobin 8.3 (L) 12.0 - 16.0 g/dL    Hematocrit 24.4 (L) 37.0 - 48.5 %    Mean Corpuscular Volume 94 82 - 98 fL    Mean Corpuscular Hemoglobin 31.9 (H) 27.0 - 31.0 pg    Mean Corpuscular Hemoglobin Conc 34.0 32.0 - 36.0 g/dL    RDW 22.8 (H) 11.5 - 14.5 %    Platelets 47 (L) 150 - 350 K/uL    MPV 10.9 9.2 - 12.9 fL    Immature Granulocytes 1.3 (H) 0.0 - 0.5 %    Gran # (ANC) 5.9 1.8 - 7.7 K/uL    Immature Grans (Abs) 0.10 (H) 0.00 - 0.04 K/uL    Lymph # 0.5 (L) 1.0 - 4.8 K/uL    Mono # 0.8 0.3 - 1.0 K/uL    Eos # 0.2 0.0 - 0.5 K/uL    Baso # 0.02 0.00 - 0.20 K/uL    nRBC 5 (A) 0 /100 WBC    Gran% 77.9 (H) 38.0 - 73.0 %    Lymph% 6.8 (L) 18.0 - 48.0 %    Mono% 11.1 4.0 - 15.0 %    Eosinophil% 2.6 0.0 -  8.0 %    Basophil% 0.3 0.0 - 1.9 %    Differential Method Automated    Comprehensive metabolic panel   Result Value Ref Range    Sodium 139 136 - 145 mmol/L    Potassium 3.4 (L) 3.5 - 5.1 mmol/L    Chloride 105 95 - 110 mmol/L    CO2 21 (L) 23 - 29 mmol/L    Glucose 93 70 - 110 mg/dL    BUN, Bld 55 (H) 6 - 20 mg/dL    Creatinine 2.2 (H) 0.5 - 1.4 mg/dL    Calcium 9.0 8.7 - 10.5 mg/dL    Total Protein 8.4 6.0 - 8.4 g/dL    Albumin 2.4 (L) 3.5 - 5.2 g/dL    Total Bilirubin 18.3 (H) 0.1 - 1.0 mg/dL    Alkaline Phosphatase 113 55 - 135 U/L     (H) 10 - 40 U/L    ALT 40 10 - 44 U/L    Anion Gap 13 8 - 16 mmol/L    eGFR if African American 31 (A) >60 mL/min/1.73 m^2    eGFR if non African American 28 (A) >60 mL/min/1.73 m^2   Ammonia   Result Value Ref Range    Ammonia 84 (H) 10 - 50 umol/L   Echo Color Flow Doppler? Yes; Bubble Contrast? No   Result Value Ref Range    BSA 1.99 m2    TDI SEPTAL 0.10 m/s    LV LATERAL E/E' RATIO 11.85 m/s    LV SEPTAL E/E' RATIO 15.40 m/s    LA WIDTH 5.68 cm    TDI LATERAL 0.13 m/s    LVIDD 4.27 3.5 - 6.0 cm    IVS 1.90 (A) 0.6 - 1.1 cm    PW 1.49 (A) 0.6 - 1.1 cm    Ao root annulus 3.24 cm    LVIDS 2.83 2.1 - 4.0 cm    FS 34 28 - 44 %    LA volume 132.32 cm3    Sinus 3.37 cm    STJ 3.28 cm    Ascending aorta 3.26 cm    LV mass 309.77 g    LA size 5.01 cm    RVDD 2.88 cm    TAPSE 2.31 cm    Left Ventricle Relative Wall Thickness 0.70 cm    AV mean gradient 10 mmHg    AV valve area 2.70 cm2    AV Velocity Ratio 0.71     AV index (prosthetic) 0.84     MV valve area p 1/2 method 3.37 cm2    E/A ratio 1.83     Mean e' 0.12 m/s    E wave decelartion time 224.94 msec    IVRT 34.25 msec    LVOT diameter 2.02 cm    LVOT area 3.2 cm2    LVOT peak tenisha 1.49 m/s    LVOT peak VTI 31.25 cm    Ao peak tenisha 2.10 m/s    Ao VTI 37.10 cm    RVOT peak tenisha 1.05 m/s    RVOT peak VTI 23.88 cm    LVOT stroke volume 100.10 cm3    AV peak gradient 18 mmHg    PV mean gradient 3.06 mmHg    E/E' ratio 13.39 m/s     MV Peak E Martinez 1.54 m/s    TR Max Martinez 3.56 m/s    MV stenosis pressure 1/2 time 65.23 ms    MV Peak A Martinez 0.84 m/s    LV Systolic Volume 30.43 mL    LV Systolic Volume Index 15.6 mL/m2    LV Diastolic Volume 81.72 mL    LV Diastolic Volume Index 41.78 mL/m2    LA Volume Index 67.6 mL/m2    LV Mass Index 158 g/m2    RA Major Axis 4.57 cm    Left Atrium Minor Axis 4.84 cm    Left Atrium Major Axis 6.29 cm    Triscuspid Valve Regurgitation Peak Gradient 51 mmHg    Right Atrial Pressure (from IVC) 3 mmHg    TV rest pulmonary artery pressure 54 mmHg   Type & Screen   Result Value Ref Range    Group & Rh B POS     Indirect Tevin NEG    Direct antiglobulin test   Result Value Ref Range    Direct Tevin (MILLIE) NEG    ISTAT PROCEDURE   Result Value Ref Range    POC PH 7.401 7.35 - 7.45    POC PCO2 33.7 (L) 35 - 45 mmHg    POC PO2 87 80 - 100 mmHg    POC HCO3 20.9 (L) 24 - 28 mmol/L    POC BE -4 -2 to 2 mmol/L    POC SATURATED O2 97 95 - 100 %    Rate 22     Sample ARTERIAL     Site RR     Allens Test Pass     DelSys Adult Vent     Mode AC/PRVC     Vt 400     PEEP 5     FiO2 55    Prepare RBC 1 Unit   Result Value Ref Range    UNIT NUMBER [id number]     Product Code [id number]     DISPENSE STATUS TRANSFUSED     CODING SYSTEM CENG101     Unit Blood Type Code 7300     Unit Blood Type B POS     Unit Expiration [id number]    Prepare Fresh Frozen Plasma 1 Unit   Result Value Ref Range    UNIT NUMBER [id number]     Product Code V4564Z77     DISPENSE STATUS TRANSFUSED     CODING SYSTEM HGUQ821     Unit Blood Type Code 7300     Unit Blood Type B POS     Unit Expiration 090279306608    Prepare RBC 1 Unit   Result Value Ref Range    UNIT NUMBER [id number]     Product Code M5316S81     DISPENSE STATUS TRANSFUSED     CODING SYSTEM BLWD602     Unit Blood Type Code 7300     Unit Blood Type B POS     Unit Expiration 964135331922    Prepare RBC 1 Unit   Result Value Ref Range    UNIT NUMBER [id number]     Product Code W2949S82      DISPENSE STATUS TRANSFUSED     CODING SYSTEM IMFI502     Unit Blood Type Code 7300     Unit Blood Type B POS     Unit Expiration 113069753648    Prepare Fresh Frozen Plasma 2 Units   Result Value Ref Range    UNIT NUMBER [id number]     Product Code O1509IS8     DISPENSE STATUS TRANSFUSED     CODING SYSTEM NSFX412     Unit Blood Type Code 7300     Unit Blood Type B POS     Unit Expiration 232835518026     UNIT NUMBER [id number]     Product Code E8974S07     DISPENSE STATUS TRANSFUSED     CODING SYSTEM CKJO245     Unit Blood Type Code 7300     Unit Blood Type B POS     Unit Expiration 541699920933    Prepare Platelets 1 Dose   Result Value Ref Range    UNIT NUMBER [id number]     Product Code D4594Z05     DISPENSE STATUS TRANSFUSED     CODING SYSTEM DDKM752     Unit Blood Type Code 9500     Unit Blood Type O NEG     Unit Expiration 657216564571    Prepare RBC 2 Units; transfuse   Result Value Ref Range    UNIT NUMBER [id number]     Product Code J5561S84     DISPENSE STATUS TRANSFUSED     CODING SYSTEM EHRS148     Unit Blood Type Code 9500     Unit Blood Type O NEG     Unit Expiration 672610081292     UNIT NUMBER [id number]     Product Code Y3965E69     DISPENSE STATUS TRANSFUSED     CODING SYSTEM EKFA732     Unit Blood Type Code 7300     Unit Blood Type B POS     Unit Expiration 083586947734    Prepare Fresh Frozen Plasma 4 Units   Result Value Ref Range    UNIT NUMBER [id number]     Product Code D9702PB1     DISPENSE STATUS TRANSFUSED     CODING SYSTEM DFSO513     Unit Blood Type Code 7300     Unit Blood Type B POS     Unit Expiration 078202019674     UNIT NUMBER [id number]     Product Code H5929AL5     DISPENSE STATUS TRANSFUSED     CODING SYSTEM BYFG955     Unit Blood Type Code 7300     Unit Blood Type B POS     Unit Expiration 380269138348     UNIT NUMBER [id number]     Product Code Y6773E47     DISPENSE STATUS TRANSFUSED     CODING SYSTEM OPJR163     Unit Blood Type Code 7300     Unit  Blood Type B POS     Unit Expiration 497579411061     UNIT NUMBER [id number]     Product Code Q6838U99     DISPENSE STATUS TRANSFUSED     CODING SYSTEM PNUS806     Unit Blood Type Code 7300     Unit Blood Type B POS     Unit Expiration 585254090952    Prepare RBC 1 Unit   Result Value Ref Range    UNIT NUMBER [id number]     Product Code V7694C97     DISPENSE STATUS TRANSFUSED     CODING SYSTEM PMWA999     Unit Blood Type Code 7300     Unit Blood Type B POS     Unit Expiration 293205850992    Prepare Cryoprecipitate 1 Dose   Result Value Ref Range    UNIT NUMBER [id number]     Product Code P2815U26     DISPENSE STATUS TRANSFUSED     CODING SYSTEM VVWD403     Unit Blood Type Code E000     Unit Blood Type X POS     Unit Expiration 711150338399        Imaging Results:  Imaging Results          X-Ray Chest AP Portable (Final result)  Result time 07/08/20 20:15:47    Final result by Raulito Jacques MD (07/08/20 20:15:47)                 Impression:      1.  Reticular and ground-glass interstitial changes present.  Tiny right effusion.  Cardiac silhouette size enlargement.  Interstitial infectious process such as atypical pneumonia difficult to exclude.  Other considerations would include interstitial pulmonary edema.    2.  Incidental findings as noted above.      Electronically signed by: Raulito Jacques MD  Date:    07/08/2020  Time:    20:15             Narrative:    EXAMINATION:  XR CHEST AP PORTABLE    CLINICAL HISTORY:  SOB;    COMPARISON:  No comparison studies are available.    FINDINGS:  EKG leads overlie the chest.  Oxygen tubing overlies the chest as well, which is rotated to the left.  Reticular and ground-glass interstitial changes throughout the lungs.  Possible tiny right effusion.  The lungs are otherwise clear.  The cardiac silhouette size is enlarged.  The trachea is midline and the mediastinal width is normal. Negative for left effusion or pneumothorax.  Pulmonary vasculature is congested.   Negative for osseous abnormalities. Tortuous aorta with calcifications of the aortic knob.  Degenerative changes of the spine with convex right curvature.                                 The EKG was ordered, reviewed, and independently interpreted by the ED provider.  Interpretation time: 1855  Rate: 103 BPM  Rhythm: sinus tachycardia  Interpretation: Possible left atrial enlargement. Low voltage QRS. Cannot rule out anteroseptal infarct, age undetermined. Prolonged QT. No STEMI.         The Emergency Provider reviewed the vital signs and test results, which are outlined above.     ED Discussion     8:00 PM: Dr. Oviedo transfers care of pt to Dr. Robert pending lab and radiology results.    11:47 PM: Dr. Robert evaluated pt. Walk test quickly desaturated to 75%.     11:50 PM: Lab reports they aren't able to run blood work after multiple attempts due to abnormal blood values. Suspect low Hgb, ordering transfusion of pRBCs and FFP and requesting stat pathology report.    11:55 PM: Discussed pt's case with Dr. Charly Lacey III (Gastroentology) who recommends medically resuscitate in ICU, transfuse blood products, then EGD.    12:50 AM: Discussed case with Dr. Saeed (Hospital Medicine). He agrees with current care and management of pt and accepts admission.   Admitting Service: Hospital Medicine  Admitting Physician: Dr. Holger Flower  Admit to: ICU    12:55 AM: Re-evaluated pt. I have discussed test results, shared treatment plan, and the need for admission with patient at bedside. Pt express understanding at this time and agree with all information. All questions answered. Pt has no further questions or concerns at this time. Pt is ready for admit.         Medical Decision Making:   Clinical Tests:   Lab Tests: Ordered and Reviewed  Radiological Study: Ordered and Reviewed  Medical Tests: Ordered and Reviewed           ED Medication(s):  Medications   0.9%  NaCl infusion (for blood administration) (has no  administration in time range)   ondansetron injection 4 mg (has no administration in time range)   morphine injection 2 mg (2 mg Intravenous Given 7/9/20 1344)   pantoprazole injection 40 mg (40 mg Intravenous Given 7/11/20 0856)   octreotide (SANDOSTATIN) 500 mcg in sodium chloride 0.9% 100 mL infusion (50 mcg/hr Intravenous Verify Only 7/11/20 0905)   nicotine 21 mg/24 hr 1 patch (1 patch Transdermal Patch Applied 7/11/20 0857)   mupirocin 2 % ointment ( Nasal Given 7/10/20 2012)   cefTRIAXone (ROCEPHIN) 2 g/50 mL D5W IVPB (2 g Intravenous New Bag 7/10/20 1453)   sodium chloride 0.9% flush 10 mL (has no administration in time range)   lactulose 10 gram/15 mL solution (enema) 200 g (200 g Rectal Given 7/11/20 0556)   furosemide injection 40 mg (has no administration in time range)   potassium chloride SA CR tablet 30 mEq (has no administration in time range)   furosemide injection 60 mg (60 mg Intravenous Given 7/8/20 2231)   phytonadione vitamin k (AQUA-MEPHYTON) 10 mg in dextrose 5 % 50 mL IVPB (10 mg Intravenous New Bag 7/9/20 0732)   magnesium sulfate 2g in water 50mL IVPB (premix) (2 g Intravenous New Bag 7/9/20 0847)   magnesium sulfate 2g in water 50mL IVPB (premix) (2 g Intravenous New Bag 7/9/20 1121)   pneumoc 13-ellen conj-dip cr(PF) (PREVNAR 13 (PF)) 0.5 mL (0.5 mLs Intramuscular Given 7/9/20 1355)   furosemide injection 80 mg (80 mg Intravenous Given 7/9/20 1518)   phytonadione vitamin k (AQUA-MEPHYTON) 10 mg in dextrose 5 % 50 mL IVPB (10 mg Intravenous New Bag 7/9/20 1843)   magnesium sulfate in dextrose IVPB (premix) 1 g (1 g Intravenous New Bag 7/10/20 1050)   lactulose 10 gram/15 mL solution (enema) 200 g (200 g Rectal Given 7/10/20 1209)   EPINEPHrine (ADRENALIN) 1 mg/mL (1 mL) injection (has no administration in time range)   propofoL (DIPRIVAN) 10 mg/mL infusion (has no administration in time range)   lidocaine (PF) 10 mg/ml (1%) 10 mg/mL (1 %) injection (has no administration in time range)    ketamine (KETALAR) 10 mg/mL injection (has no administration in time range)       Current Discharge Medication List                  Scribe Attestation:   Scribe #1: I performed the above scribed service and the documentation accurately describes the services I performed. I attest to the accuracy of the note.     Attending:   Physician Attestation Statement for Scribe #1: I, Magdaleno Oviedo MD, personally performed the services described in this documentation, as scribed by Rafael Thomason, in my presence, and it is both accurate and complete.       Scribe Attestation:   Scribe #2: I performed the above scribed service and the documentation accurately describes the services I performed. I attest to the accuracy of the note.    Attending Attestation:           Physician Attestation for Scribe:    Physician Attestation Statement for Scribe #2: I, Lenny Robert MD, reviewed documentation, as scribed by Ember Peng/Odessa Vinson in my presence, and it is both accurate and complete. I also acknowledge and confirm the content of the note done by Scribe #1.           Clinical Impression       ICD-10-CM ICD-9-CM   1. Decompensated HCV cirrhosis  B19.20 070.70    K74.69 571.5   2. SOB (shortness of breath)  R06.02 786.05   3. Acute renal failure, unspecified acute renal failure type  N17.9 584.9   4. Acute pulmonary edema  J81.0 518.4   5. Melena  K92.1 578.1   6. Elevated brain natriuretic peptide (BNP) level  R79.89 790.99   7. Coagulopathy  D68.9 286.9   8. Encephalopathy  G93.40 348.30   9. Gastrointestinal hemorrhage with melena  K92.1 578.1   10. Hypomagnesemia  E83.42 275.2   11. Metabolic acidosis, increased anion gap  E87.2 276.2   12. Severe anemia  D64.9 285.9   13. Acute blood loss anemia  D62 285.1   14. SUNG (acute kidney injury)  N17.9 584.9   15. Alcohol abuse  F10.10 305.00   16. Encephalopathy, metabolic  G93.41 348.31   17. Stage 3 chronic kidney disease  N18.3 585.3   18. Prerenal acute renal failure   N17.9 584.9       Disposition:   Disposition: Admitted  Condition: Critical       Lenny Robert MD  07/11/20 1008

## 2020-07-09 PROBLEM — K92.2 GIB (GASTROINTESTINAL BLEEDING): Status: ACTIVE | Noted: 2020-07-09

## 2020-07-09 PROBLEM — K74.69 DECOMPENSATED HCV CIRRHOSIS: Status: ACTIVE | Noted: 2020-07-09

## 2020-07-09 PROBLEM — K92.1 GASTROINTESTINAL HEMORRHAGE WITH MELENA: Status: ACTIVE | Noted: 2020-07-09

## 2020-07-09 PROBLEM — N17.9 AKI (ACUTE KIDNEY INJURY): Status: ACTIVE | Noted: 2020-07-09

## 2020-07-09 PROBLEM — B19.20 DECOMPENSATED HCV CIRRHOSIS: Status: ACTIVE | Noted: 2020-07-09

## 2020-07-09 PROBLEM — G93.40 ENCEPHALOPATHY: Status: ACTIVE | Noted: 2020-07-09

## 2020-07-09 PROBLEM — E87.29 METABOLIC ACIDOSIS, INCREASED ANION GAP: Status: ACTIVE | Noted: 2020-07-09

## 2020-07-09 PROBLEM — D62 ACUTE BLOOD LOSS ANEMIA: Status: ACTIVE | Noted: 2020-07-09

## 2020-07-09 PROBLEM — D64.9 SEVERE ANEMIA: Status: ACTIVE | Noted: 2020-07-09

## 2020-07-09 PROBLEM — E83.42 HYPOMAGNESEMIA: Status: ACTIVE | Noted: 2020-07-09

## 2020-07-09 PROBLEM — F10.10 ALCOHOL ABUSE: Chronic | Status: ACTIVE | Noted: 2020-07-09

## 2020-07-09 PROBLEM — K76.82 ACUTE HEPATIC ENCEPHALOPATHY: Status: ACTIVE | Noted: 2020-07-09

## 2020-07-09 PROBLEM — D68.9 COAGULOPATHY: Status: ACTIVE | Noted: 2020-07-09

## 2020-07-09 PROBLEM — G93.41 ENCEPHALOPATHY, METABOLIC: Status: ACTIVE | Noted: 2020-07-09

## 2020-07-09 LAB
ABO + RH BLD: NORMAL
ALBUMIN SERPL BCP-MCNC: 2.4 G/DL (ref 3.5–5.2)
ALP SERPL-CCNC: 135 U/L (ref 55–135)
ALT SERPL W/O P-5'-P-CCNC: 42 U/L (ref 10–44)
AMMONIA PLAS-SCNC: 65 UMOL/L (ref 10–50)
ANION GAP SERPL CALC-SCNC: 14 MMOL/L (ref 8–16)
ANION GAP SERPL CALC-SCNC: 23 MMOL/L (ref 8–16)
ANISOCYTOSIS BLD QL SMEAR: ABNORMAL
AORTIC ROOT ANNULUS: 3.24 CM
ASCENDING AORTA: 3.26 CM
AST SERPL-CCNC: 226 U/L (ref 10–40)
AV INDEX (PROSTH): 0.84
AV MEAN GRADIENT: 10 MMHG
AV PEAK GRADIENT: 18 MMHG
AV VALVE AREA: 2.7 CM2
AV VELOCITY RATIO: 0.71
BASOPHILS # BLD AUTO: 0.01 K/UL (ref 0–0.2)
BASOPHILS NFR BLD: 0.1 % (ref 0–1.9)
BILIRUB DIRECT SERPL-MCNC: 10.1 MG/DL (ref 0.1–0.3)
BILIRUB SERPL-MCNC: 14.9 MG/DL (ref 0.1–1)
BLD GP AB SCN CELLS X3 SERPL QL: NORMAL
BLD PROD TYP BPU: NORMAL
BLOOD UNIT EXPIRATION DATE: NORMAL
BLOOD UNIT TYPE CODE: 7300
BLOOD UNIT TYPE CODE: 9500
BLOOD UNIT TYPE: NORMAL
BSA FOR ECHO PROCEDURE: 1.99 M2
BUN SERPL-MCNC: 25 MG/DL (ref 6–20)
BUN SERPL-MCNC: 36 MG/DL (ref 6–20)
BURR CELLS BLD QL SMEAR: ABNORMAL
CALCIUM SERPL-MCNC: 9 MG/DL (ref 8.7–10.5)
CALCIUM SERPL-MCNC: 9.3 MG/DL (ref 8.7–10.5)
CHLORIDE SERPL-SCNC: 103 MMOL/L (ref 95–110)
CHLORIDE SERPL-SCNC: 103 MMOL/L (ref 95–110)
CO2 SERPL-SCNC: 10 MMOL/L (ref 23–29)
CO2 SERPL-SCNC: 19 MMOL/L (ref 23–29)
CODING SYSTEM: NORMAL
CREAT SERPL-MCNC: 2.1 MG/DL (ref 0.5–1.4)
CREAT SERPL-MCNC: 2.2 MG/DL (ref 0.5–1.4)
CV ECHO LV RWT: 0.7 CM
D DIMER PPP IA.FEU-MCNC: 12.25 MG/L FEU
DIFFERENTIAL METHOD: ABNORMAL
DISPENSE STATUS: NORMAL
DOP CALC AO PEAK VEL: 2.1 M/S
DOP CALC AO VTI: 37.1 CM
DOP CALC LVOT AREA: 3.2 CM2
DOP CALC LVOT DIAMETER: 2.02 CM
DOP CALC LVOT PEAK VEL: 1.49 M/S
DOP CALC LVOT STROKE VOLUME: 100.1 CM3
DOP CALC RVOT PEAK VEL: 1.05 M/S
DOP CALC RVOT VTI: 23.88 CM
DOP CALCLVOT PEAK VEL VTI: 31.25 CM
E WAVE DECELERATION TIME: 224.94 MSEC
E/A RATIO: 1.83
E/E' RATIO: 13.39 M/S
ECHO LV POSTERIOR WALL: 1.49 CM (ref 0.6–1.1)
EOSINOPHIL # BLD AUTO: 0 K/UL (ref 0–0.5)
EOSINOPHIL NFR BLD: 0.1 % (ref 0–8)
ERYTHROCYTE [DISTWIDTH] IN BLOOD BY AUTOMATED COUNT: 23.9 % (ref 11.5–14.5)
EST. GFR  (AFRICAN AMERICAN): 31 ML/MIN/1.73 M^2
EST. GFR  (AFRICAN AMERICAN): 33 ML/MIN/1.73 M^2
EST. GFR  (NON AFRICAN AMERICAN): 27 ML/MIN/1.73 M^2
EST. GFR  (NON AFRICAN AMERICAN): 28 ML/MIN/1.73 M^2
ETHANOL SERPL-MCNC: <10 MG/DL
FIBRINOGEN PPP-MCNC: 157 MG/DL (ref 182–366)
FOLATE SERPL-MCNC: 4 NG/ML (ref 4–24)
FRACTIONAL SHORTENING: 34 % (ref 28–44)
GLUCOSE SERPL-MCNC: 93 MG/DL (ref 70–110)
GLUCOSE SERPL-MCNC: 96 MG/DL (ref 70–110)
HAPTOGLOB SERPL-MCNC: <10 MG/DL (ref 30–250)
HCT VFR BLD AUTO: 12.9 % (ref 37–48.5)
HCT VFR BLD AUTO: 18.3 % (ref 37–48.5)
HGB BLD-MCNC: 3.8 G/DL (ref 12–16)
HGB BLD-MCNC: 6.6 G/DL (ref 12–16)
HYPOCHROMIA BLD QL SMEAR: ABNORMAL
IMM GRANULOCYTES # BLD AUTO: 0.19 K/UL (ref 0–0.04)
IMM GRANULOCYTES NFR BLD AUTO: 1.3 % (ref 0–0.5)
INR PPP: 2.1 (ref 0.8–1.2)
INTERVENTRICULAR SEPTUM: 1.9 CM (ref 0.6–1.1)
IVRT: 34.25 MSEC
LA MAJOR: 6.29 CM
LA MINOR: 4.84 CM
LA WIDTH: 5.68 CM
LEFT ATRIUM SIZE: 5.01 CM
LEFT ATRIUM VOLUME INDEX: 67.6 ML/M2
LEFT ATRIUM VOLUME: 132.32 CM3
LEFT INTERNAL DIMENSION IN SYSTOLE: 2.83 CM (ref 2.1–4)
LEFT VENTRICLE DIASTOLIC VOLUME INDEX: 41.78 ML/M2
LEFT VENTRICLE DIASTOLIC VOLUME: 81.72 ML
LEFT VENTRICLE MASS INDEX: 158 G/M2
LEFT VENTRICLE SYSTOLIC VOLUME INDEX: 15.6 ML/M2
LEFT VENTRICLE SYSTOLIC VOLUME: 30.43 ML
LEFT VENTRICULAR INTERNAL DIMENSION IN DIASTOLE: 4.27 CM (ref 3.5–6)
LEFT VENTRICULAR MASS: 309.77 G
LV LATERAL E/E' RATIO: 11.85 M/S
LV SEPTAL E/E' RATIO: 15.4 M/S
LYMPHOCYTES # BLD AUTO: 0.9 K/UL (ref 1–4.8)
LYMPHOCYTES NFR BLD: 6.1 % (ref 18–48)
MAGNESIUM SERPL-MCNC: 1.1 MG/DL (ref 1.6–2.6)
MAGNESIUM SERPL-MCNC: 1.9 MG/DL (ref 1.6–2.6)
MCH RBC QN AUTO: 33.9 PG (ref 27–31)
MCHC RBC AUTO-ENTMCNC: 29.5 G/DL (ref 32–36)
MCV RBC AUTO: 115 FL (ref 82–98)
MONOCYTES # BLD AUTO: 1.1 K/UL (ref 0.3–1)
MONOCYTES NFR BLD: 7.6 % (ref 4–15)
MV PEAK A VEL: 0.84 M/S
MV PEAK E VEL: 1.54 M/S
MV STENOSIS PRESSURE HALF TIME: 65.23 MS
MV VALVE AREA P 1/2 METHOD: 3.37 CM2
NEUTROPHILS # BLD AUTO: 12.4 K/UL (ref 1.8–7.7)
NEUTROPHILS NFR BLD: 84.8 % (ref 38–73)
NRBC BLD-RTO: 5 /100 WBC
NUM UNITS TRANS FFP: NORMAL
NUM UNITS TRANS PACKED RBC: NORMAL
OB PNL STL: POSITIVE
PHOSPHATE SERPL-MCNC: 4.7 MG/DL (ref 2.7–4.5)
PISA TR MAX VEL: 3.56 M/S
PLATELET # BLD AUTO: 49 K/UL (ref 150–350)
PLATELET BLD QL SMEAR: ABNORMAL
PMV BLD AUTO: 10.9 FL (ref 9.2–12.9)
POIKILOCYTOSIS BLD QL SMEAR: ABNORMAL
POLYCHROMASIA BLD QL SMEAR: ABNORMAL
POTASSIUM SERPL-SCNC: 4.1 MMOL/L (ref 3.5–5.1)
POTASSIUM SERPL-SCNC: 4.5 MMOL/L (ref 3.5–5.1)
PROT SERPL-MCNC: 9.2 G/DL (ref 6–8.4)
PROTHROMBIN TIME: 21.6 SEC (ref 9–12.5)
PV MEAN GRADIENT: 3.06 MMHG
RA MAJOR: 4.57 CM
RA PRESSURE: 3 MMHG
RBC # BLD AUTO: 1.12 M/UL (ref 4–5.4)
RIGHT VENTRICULAR END-DIASTOLIC DIMENSION: 2.88 CM
SINUS: 3.37 CM
SODIUM SERPL-SCNC: 136 MMOL/L (ref 136–145)
SODIUM SERPL-SCNC: 136 MMOL/L (ref 136–145)
STJ: 3.28 CM
TARGETS BLD QL SMEAR: ABNORMAL
TDI LATERAL: 0.13 M/S
TDI SEPTAL: 0.1 M/S
TDI: 0.12 M/S
TR MAX PG: 51 MMHG
TRANS PLATPHERESIS VOL PATIENT: NORMAL ML
TRICUSPID ANNULAR PLANE SYSTOLIC EXCURSION: 2.31 CM
TSH SERPL DL<=0.005 MIU/L-ACNC: 2.28 UIU/ML (ref 0.4–4)
TV REST PULMONARY ARTERY PRESSURE: 54 MMHG
VIT B12 SERPL-MCNC: 906 PG/ML (ref 210–950)
WBC # BLD AUTO: 14.63 K/UL (ref 3.9–12.7)

## 2020-07-09 PROCEDURE — 25000003 PHARM REV CODE 250: Performed by: PHYSICIAN ASSISTANT

## 2020-07-09 PROCEDURE — 63600175 PHARM REV CODE 636 W HCPCS: Performed by: NURSE PRACTITIONER

## 2020-07-09 PROCEDURE — 25000003 PHARM REV CODE 250: Performed by: NURSE PRACTITIONER

## 2020-07-09 PROCEDURE — 99291 CRITICAL CARE FIRST HOUR: CPT | Mod: ,,, | Performed by: NURSE PRACTITIONER

## 2020-07-09 PROCEDURE — 94761 N-INVAS EAR/PLS OXIMETRY MLT: CPT

## 2020-07-09 PROCEDURE — 83010 ASSAY OF HAPTOGLOBIN QUANT: CPT

## 2020-07-09 PROCEDURE — 97802 MEDICAL NUTRITION INDIV IN: CPT

## 2020-07-09 PROCEDURE — 85025 COMPLETE CBC W/AUTO DIFF WBC: CPT

## 2020-07-09 PROCEDURE — 83735 ASSAY OF MAGNESIUM: CPT

## 2020-07-09 PROCEDURE — P9016 RBC LEUKOCYTES REDUCED: HCPCS

## 2020-07-09 PROCEDURE — 90670 PCV13 VACCINE IM: CPT | Performed by: NURSE PRACTITIONER

## 2020-07-09 PROCEDURE — 85014 HEMATOCRIT: CPT

## 2020-07-09 PROCEDURE — 86706 HEP B SURFACE ANTIBODY: CPT

## 2020-07-09 PROCEDURE — P9035 PLATELET PHERES LEUKOREDUCED: HCPCS

## 2020-07-09 PROCEDURE — 85379 FIBRIN DEGRADATION QUANT: CPT

## 2020-07-09 PROCEDURE — 63600175 PHARM REV CODE 636 W HCPCS: Performed by: FAMILY MEDICINE

## 2020-07-09 PROCEDURE — S4991 NICOTINE PATCH NONLEGEND: HCPCS | Performed by: FAMILY MEDICINE

## 2020-07-09 PROCEDURE — 25000003 PHARM REV CODE 250: Performed by: INTERNAL MEDICINE

## 2020-07-09 PROCEDURE — 99233 SBSQ HOSP IP/OBS HIGH 50: CPT | Mod: ,,, | Performed by: INTERNAL MEDICINE

## 2020-07-09 PROCEDURE — 80074 ACUTE HEPATITIS PANEL: CPT

## 2020-07-09 PROCEDURE — 27000221 HC OXYGEN, UP TO 24 HOURS

## 2020-07-09 PROCEDURE — 80320 DRUG SCREEN QUANTALCOHOLS: CPT

## 2020-07-09 PROCEDURE — 83735 ASSAY OF MAGNESIUM: CPT | Mod: 91

## 2020-07-09 PROCEDURE — 85018 HEMOGLOBIN: CPT

## 2020-07-09 PROCEDURE — 87040 BLOOD CULTURE FOR BACTERIA: CPT

## 2020-07-09 PROCEDURE — 80053 COMPREHEN METABOLIC PANEL: CPT

## 2020-07-09 PROCEDURE — 63600175 PHARM REV CODE 636 W HCPCS: Performed by: PHYSICIAN ASSISTANT

## 2020-07-09 PROCEDURE — C9113 INJ PANTOPRAZOLE SODIUM, VIA: HCPCS | Performed by: FAMILY MEDICINE

## 2020-07-09 PROCEDURE — 90471 IMMUNIZATION ADMIN: CPT | Performed by: NURSE PRACTITIONER

## 2020-07-09 PROCEDURE — P9017 PLASMA 1 DONOR FRZ W/IN 8 HR: HCPCS

## 2020-07-09 PROCEDURE — 99291 PR CRITICAL CARE, E/M 30-74 MINUTES: ICD-10-PCS | Mod: ,,, | Performed by: NURSE PRACTITIONER

## 2020-07-09 PROCEDURE — 25000003 PHARM REV CODE 250: Performed by: FAMILY MEDICINE

## 2020-07-09 PROCEDURE — 82140 ASSAY OF AMMONIA: CPT

## 2020-07-09 PROCEDURE — 80048 BASIC METABOLIC PNL TOTAL CA: CPT

## 2020-07-09 PROCEDURE — 84443 ASSAY THYROID STIM HORMONE: CPT

## 2020-07-09 PROCEDURE — C9113 INJ PANTOPRAZOLE SODIUM, VIA: HCPCS | Performed by: INTERNAL MEDICINE

## 2020-07-09 PROCEDURE — 99291 PR CRITICAL CARE, E/M 30-74 MINUTES: ICD-10-PCS | Mod: ,,, | Performed by: INTERNAL MEDICINE

## 2020-07-09 PROCEDURE — 20000000 HC ICU ROOM

## 2020-07-09 PROCEDURE — 85610 PROTHROMBIN TIME: CPT

## 2020-07-09 PROCEDURE — 84100 ASSAY OF PHOSPHORUS: CPT

## 2020-07-09 PROCEDURE — 99291 CRITICAL CARE FIRST HOUR: CPT | Mod: ,,, | Performed by: INTERNAL MEDICINE

## 2020-07-09 PROCEDURE — 82746 ASSAY OF FOLIC ACID SERUM: CPT

## 2020-07-09 PROCEDURE — 82607 VITAMIN B-12: CPT

## 2020-07-09 PROCEDURE — 99233 PR SUBSEQUENT HOSPITAL CARE,LEVL III: ICD-10-PCS | Mod: ,,, | Performed by: INTERNAL MEDICINE

## 2020-07-09 PROCEDURE — 85397 CLOTTING FUNCT ACTIVITY: CPT

## 2020-07-09 PROCEDURE — 85384 FIBRINOGEN ACTIVITY: CPT

## 2020-07-09 PROCEDURE — 63600175 PHARM REV CODE 636 W HCPCS: Performed by: INTERNAL MEDICINE

## 2020-07-09 PROCEDURE — 84425 ASSAY OF VITAMIN B-1: CPT

## 2020-07-09 PROCEDURE — 36415 COLL VENOUS BLD VENIPUNCTURE: CPT

## 2020-07-09 PROCEDURE — 82248 BILIRUBIN DIRECT: CPT

## 2020-07-09 RX ORDER — MAGNESIUM SULFATE HEPTAHYDRATE 40 MG/ML
2 INJECTION, SOLUTION INTRAVENOUS ONCE
Status: COMPLETED | OUTPATIENT
Start: 2020-07-09 | End: 2020-07-09

## 2020-07-09 RX ORDER — OCTREOTIDE ACETATE 50 UG/ML
50 INJECTION, SOLUTION INTRAVENOUS; SUBCUTANEOUS ONCE
Status: DISCONTINUED | OUTPATIENT
Start: 2020-07-09 | End: 2020-07-09 | Stop reason: RX

## 2020-07-09 RX ORDER — FUROSEMIDE 10 MG/ML
80 INJECTION INTRAMUSCULAR; INTRAVENOUS ONCE
Status: COMPLETED | OUTPATIENT
Start: 2020-07-09 | End: 2020-07-09

## 2020-07-09 RX ORDER — SODIUM CHLORIDE 0.9 % (FLUSH) 0.9 %
10 SYRINGE (ML) INJECTION
Status: DISCONTINUED | OUTPATIENT
Start: 2020-07-09 | End: 2020-07-13 | Stop reason: HOSPADM

## 2020-07-09 RX ORDER — LACTULOSE 10 G/15ML
10 SOLUTION ORAL 3 TIMES DAILY
Status: DISCONTINUED | OUTPATIENT
Start: 2020-07-09 | End: 2020-07-10

## 2020-07-09 RX ORDER — PANTOPRAZOLE SODIUM 40 MG/10ML
40 INJECTION, POWDER, LYOPHILIZED, FOR SOLUTION INTRAVENOUS DAILY
Status: DISCONTINUED | OUTPATIENT
Start: 2020-07-09 | End: 2020-07-09

## 2020-07-09 RX ORDER — MUPIROCIN 20 MG/G
OINTMENT TOPICAL 2 TIMES DAILY
Status: DISCONTINUED | OUTPATIENT
Start: 2020-07-09 | End: 2020-07-13 | Stop reason: HOSPADM

## 2020-07-09 RX ORDER — PANTOPRAZOLE SODIUM 40 MG/10ML
40 INJECTION, POWDER, LYOPHILIZED, FOR SOLUTION INTRAVENOUS 2 TIMES DAILY
Status: DISCONTINUED | OUTPATIENT
Start: 2020-07-09 | End: 2020-07-13 | Stop reason: HOSPADM

## 2020-07-09 RX ORDER — ONDANSETRON 2 MG/ML
4 INJECTION INTRAMUSCULAR; INTRAVENOUS EVERY 8 HOURS PRN
Status: DISCONTINUED | OUTPATIENT
Start: 2020-07-09 | End: 2020-07-13 | Stop reason: HOSPADM

## 2020-07-09 RX ORDER — MORPHINE SULFATE 4 MG/ML
2 INJECTION, SOLUTION INTRAMUSCULAR; INTRAVENOUS EVERY 4 HOURS PRN
Status: DISCONTINUED | OUTPATIENT
Start: 2020-07-09 | End: 2020-07-12

## 2020-07-09 RX ORDER — LACTULOSE 10 G/15ML
20 SOLUTION ORAL 2 TIMES DAILY
Status: DISCONTINUED | OUTPATIENT
Start: 2020-07-09 | End: 2020-07-09

## 2020-07-09 RX ORDER — LACTULOSE 10 G/15ML
200 SOLUTION ORAL; RECTAL 3 TIMES DAILY
Status: DISCONTINUED | OUTPATIENT
Start: 2020-07-09 | End: 2020-07-09

## 2020-07-09 RX ORDER — IBUPROFEN 200 MG
1 TABLET ORAL DAILY
Status: DISCONTINUED | OUTPATIENT
Start: 2020-07-09 | End: 2020-07-13 | Stop reason: HOSPADM

## 2020-07-09 RX ADMIN — OCTREOTIDE ACETATE 50 MCG/HR: 500 INJECTION, SOLUTION INTRAVENOUS; SUBCUTANEOUS at 10:07

## 2020-07-09 RX ADMIN — LACTULOSE 10 G: 20 SOLUTION ORAL at 08:07

## 2020-07-09 RX ADMIN — OCTREOTIDE ACETATE 50 MCG/HR: 500 INJECTION, SOLUTION INTRAVENOUS; SUBCUTANEOUS at 01:07

## 2020-07-09 RX ADMIN — OCTREOTIDE ACETATE 50 MCG: 50 INJECTION, SOLUTION INTRAVENOUS; SUBCUTANEOUS at 09:07

## 2020-07-09 RX ADMIN — PHYTONADIONE 10 MG: 10 INJECTION, EMULSION INTRAMUSCULAR; INTRAVENOUS; SUBCUTANEOUS at 07:07

## 2020-07-09 RX ADMIN — PANTOPRAZOLE SODIUM 40 MG: 40 INJECTION, POWDER, LYOPHILIZED, FOR SOLUTION INTRAVENOUS at 08:07

## 2020-07-09 RX ADMIN — LORAZEPAM 2 MG: 2 INJECTION INTRAMUSCULAR; INTRAVENOUS at 08:07

## 2020-07-09 RX ADMIN — Medication 1 PATCH: at 11:07

## 2020-07-09 RX ADMIN — PANTOPRAZOLE SODIUM 40 MG: 40 INJECTION, POWDER, LYOPHILIZED, FOR SOLUTION INTRAVENOUS at 12:07

## 2020-07-09 RX ADMIN — MAGNESIUM SULFATE 2 G: 2 INJECTION INTRAVENOUS at 11:07

## 2020-07-09 RX ADMIN — LACTULOSE 20 G: 20 SOLUTION ORAL at 09:07

## 2020-07-09 RX ADMIN — PNEUMOCOCCAL 13-VALENT CONJUGATE VACCINE 0.5 ML: 2.2; 2.2; 2.2; 2.2; 2.2; 4.4; 2.2; 2.2; 2.2; 2.2; 2.2; 2.2; 2.2 INJECTION, SUSPENSION INTRAMUSCULAR at 01:07

## 2020-07-09 RX ADMIN — MUPIROCIN: 20 OINTMENT TOPICAL at 01:07

## 2020-07-09 RX ADMIN — PHYTONADIONE 10 MG: 10 INJECTION, EMULSION INTRAMUSCULAR; INTRAVENOUS; SUBCUTANEOUS at 06:07

## 2020-07-09 RX ADMIN — MUPIROCIN: 20 OINTMENT TOPICAL at 08:07

## 2020-07-09 RX ADMIN — MAGNESIUM SULFATE 2 G: 2 INJECTION INTRAVENOUS at 08:07

## 2020-07-09 RX ADMIN — PANTOPRAZOLE SODIUM 40 MG: 40 INJECTION, POWDER, LYOPHILIZED, FOR SOLUTION INTRAVENOUS at 09:07

## 2020-07-09 RX ADMIN — FUROSEMIDE 80 MG: 10 INJECTION, SOLUTION INTRAMUSCULAR; INTRAVENOUS at 03:07

## 2020-07-09 RX ADMIN — MORPHINE SULFATE 2 MG: 4 INJECTION, SOLUTION INTRAMUSCULAR; INTRAVENOUS at 01:07

## 2020-07-09 RX ADMIN — LORAZEPAM 2 MG: 2 INJECTION INTRAMUSCULAR; INTRAVENOUS at 03:07

## 2020-07-09 RX ADMIN — CEFTRIAXONE 2 G: 2 INJECTION, SOLUTION INTRAVENOUS at 01:07

## 2020-07-09 NOTE — ASSESSMENT & PLAN NOTE
Likely secondary to volume depletion, will continue to transfuse until hemoglobin greater than 7.  Nephrology following.    --management per Nephrology

## 2020-07-09 NOTE — ASSESSMENT & PLAN NOTE
Longstanding history of alcoholic liver disease and hepatitis.  Patient continues to drink alcohol, last drink few weeks ago (per patient).  GI consult.  Patient received Lasix 60 mg IV x1 in the ED.  Reports noncompliance with all medications, took medications many months ago.

## 2020-07-09 NOTE — ASSESSMENT & PLAN NOTE
Likely multifactorial. Will start start her on lactulose. Monitor neuro status closely.   See above.

## 2020-07-09 NOTE — ED NOTES
Patient sitting up in bed, no acute distress noted. Patient appears confused as seen talking to self, when asked, patient pointed into the air and stated she was talking to her daughter over there. Eyes, lips and finger nails appears jaundiced. Respirations even and unlabored on 3L NC. Abdomen round and distended. Alicea catheter in place with 150ml of urine. Patient updated on status and plan of care. Side rails up x 2, call light in reach, bed low and locked. Denies any needs at this time. Will continue to monitor.

## 2020-07-09 NOTE — ASSESSMENT & PLAN NOTE
AG acidosis. Likely due to lactic acidosis. Expect this to improve with blood transfusion/correction of anemia.

## 2020-07-09 NOTE — ASSESSMENT & PLAN NOTE
Secondary to cirrhosis of the liver, elevated INR and APTT on admission, negative for report of taking an anticoagulant.  Given 2 units FFP and 10 mg vitamin K overnight.    --daily CBC to monitor for bleeding

## 2020-07-09 NOTE — ASSESSMENT & PLAN NOTE
Severe symptomatic anemia.  Hemoglobin was undetectable initially one in the ED presentation, currently 3.8 after 1.5 units of PRBC transfusion.  Transfused total of 3 units PRBC.  Repeat H&H and may need further transfusion after that.  Closely monitor in the ICU.  Patient denies hematemesis.

## 2020-07-09 NOTE — ASSESSMENT & PLAN NOTE
Likely multifactorial: Anemia, Met acidosis, elevated ammonia and SUNG  Transfuse  Add Lactulose  Bicarb infusion  Follow up labs

## 2020-07-09 NOTE — SUBJECTIVE & OBJECTIVE
Oncology Treatment Plan:   [No treatment plan]    Medications:  Continuous Infusions:   octreotide (SANDOSTATIN) infusion       Scheduled Meds:   lactulose  200 g Rectal TID    lactulose  20 g Oral BID    magnesium sulfate IVPB  2 g Intravenous Once    mupirocin   Nasal BID    nicotine  1 patch Transdermal Daily    pantoprazole  40 mg Intravenous BID     PRN Meds:sodium chloride, sodium chloride, morphine, ondansetron, pneumoc 13-ellen conj-dip cr(PF)     Review of patient's allergies indicates:  No Known Allergies     Past Medical History:   Diagnosis Date    Alcohol abuse     Cirrhosis     Depression     Hypertension     Portal hypertension with esophageal varices      Past Surgical History:   Procedure Laterality Date     SECTION      ESOPHAGOGASTRODUODENOSCOPY       Family History     None        Tobacco Use    Smoking status: Current Every Day Smoker     Packs/day: 0.50   Substance and Sexual Activity    Alcohol use: Not Currently    Drug use: Never    Sexual activity: Not on file       Review of Systems   Reason unable to perform ROS: Patient confused and unable to answer questions appropriately.     Objective:     Vital Signs (Most Recent):  Temp: 98.3 °F (36.8 °C) (20 0721)  Pulse: 97 (20 1131)  Resp: (!) 22 (20 1131)  BP: (!) 150/61 (20 1130)  SpO2: 99 % (20 1131) Vital Signs (24h Range):  Temp:  [97.9 °F (36.6 °C)-98.6 °F (37 °C)] 98.3 °F (36.8 °C)  Pulse:  [] 97  Resp:  [18-37] 22  SpO2:  [96 %-100 %] 99 %  BP: (115-167)/() 150/61     Weight: 83.9 kg (185 lb)  Body mass index is 28.98 kg/m².  Body surface area is 1.99 meters squared.      Intake/Output Summary (Last 24 hours) at 2020 1247  Last data filed at 2020 1151  Gross per 24 hour   Intake 2174.02 ml   Output 155 ml   Net 2019.02 ml       Physical Exam  Vitals signs and nursing note reviewed. Exam conducted with a chaperone present (daughter).   Constitutional:       General:  She is not in acute distress.     Appearance: She is ill-appearing.   HENT:      Head: Normocephalic and atraumatic.      Right Ear: Hearing and external ear normal.      Left Ear: Hearing and external ear normal.      Nose: No rhinorrhea.      Right Sinus: No maxillary sinus tenderness or frontal sinus tenderness.      Left Sinus: No maxillary sinus tenderness or frontal sinus tenderness.      Mouth/Throat:      Mouth: No oral lesions.      Pharynx: Uvula midline.   Eyes:      General:         Right eye: No discharge.         Left eye: No discharge.      Conjunctiva/sclera: Conjunctivae normal.      Pupils: Pupils are equal, round, and reactive to light.   Neck:      Musculoskeletal: Normal range of motion.      Thyroid: No thyromegaly.      Vascular: No carotid bruit.      Trachea: No tracheal deviation.   Cardiovascular:      Rate and Rhythm: Regular rhythm. Tachycardia present.      Pulses:           Dorsalis pedis pulses are 1+ on the right side and 1+ on the left side.      Heart sounds: S1 normal and S2 normal. Murmur present.   Pulmonary:      Effort: Pulmonary effort is normal. No respiratory distress.      Breath sounds: Examination of the right-middle field reveals rales. Examination of the left-middle field reveals rales. Examination of the right-lower field reveals decreased breath sounds. Examination of the left-lower field reveals decreased breath sounds. Decreased breath sounds and rales present.   Abdominal:      General: Bowel sounds are decreased. There is distension.      Palpations: There is fluid wave. There is no mass.      Tenderness: There is generalized abdominal tenderness.   Musculoskeletal: Normal range of motion.   Lymphadenopathy:      Cervical: No cervical adenopathy.      Upper Body:      Right upper body: No supraclavicular adenopathy.      Left upper body: No supraclavicular adenopathy.   Skin:     General: Skin is warm and dry.      Capillary Refill: Capillary refill takes less  than 2 seconds.      Findings: No rash.   Neurological:      Mental Status: She is lethargic and confused.      Sensory: No sensory deficit.   Psychiatric:         Mood and Affect: Mood is anxious. Affect is labile.         Speech: Speech is slurred.         Behavior: Behavior is uncooperative.         Cognition and Memory: Cognition is impaired. Memory is impaired.      Comments: Patient reports distant history of ETOH consumption daughter at bedside reports patient did not drink yesterday due to lack of money but was drinking heavily over the weekend.          Significant Labs:   CBC:   Recent Labs   Lab 07/09/20  0408   WBC 14.63*   HGB 3.8*   HCT 12.9*   PLT 49*    and CMP:   Recent Labs   Lab 07/08/20  1852 07/09/20  0408   * 136   K 4.0 4.5    103   CO2 8* 10*   GLU 84 93   BUN 22* 25*   CREATININE 1.6* 2.1*   CALCIUM 9.2 9.0   PROT 9.2* 9.2*   ALBUMIN 2.2* 2.4*   BILITOT 14.9* 14.9*   ALKPHOS 154* 135   * 226*   ALT 44 42   ANIONGAP 23* 23*   EGFRNONAA 39* 28*       Diagnostic Results:  I have reviewed all pertinent imaging results/findings within the past 24 hours.

## 2020-07-09 NOTE — HPI
44 year old female, presented to the ER with weakness and SOB. Patient is currently confused and unable to provide any history, Hx obtained from the medical record and medical staff. Review of Care Everywhere shows a visit to [facility] on 08/27/19 for alcoholic cirrhosis with h/o esophageal varices, ascites and mild encephalopathy. She had positive HAV IgG and negative HCV RNA. HBV status unknown. 04/2019 US showed small ascites and CT scan showed diffuse fatty infiltration of liver with hepatosplenomegaly and enlarged gastric varices consistent with portal hypertension, and cholelithiasis. Baseline Hgb around 9. LFTs were similar to this admit with T. Bili over 10 and INR below 2.   Hgb: 3.8 after two units of blood, 2 units of FFP and 10mg Vit. K overnight. ER reported dark red stools overnight. No BM in ICU. INR: 2.0. LFT elevated. WBC elevated. Afebrile. No significant tachycardia and BP has been primarily normal to elevated. UA unremarkable. CXR showed possible process on the right. Nurse reports no complaints of pain from patient and no vomiting. Daughter at bedside reports patient continues to drink ETOH heavily, patient denies this.

## 2020-07-09 NOTE — ASSESSMENT & PLAN NOTE
No active bleeding since admit  Plan EGD once transfusions complete and more stable  GI following:   Due to tolerance of severely low Hgb and relatively stable vitals, acute or active GI bleed unlikely. However, we can't rule out chronic bleeding. Will plan on EGD once patient is more stable (Hgb above 7, INR around 1.5) We will also need to see who can consent for her if she remains confused.   Protonix IV 40 mg bid.   Presentation doesn't support variceal bleed but she has a history of variceal bleed so will bolus Octreotide and follow with infusion.   She will also need Rocephin 2 g daily.    Continue to monitor H/H closely. Agree with additional units of blood.

## 2020-07-09 NOTE — ASSESSMENT & PLAN NOTE
GI bleed with melena.  Hemoglobin 3.8 after 1.5 unit PRBC transfusion.  Keep NPO.  GI consulted.  For possible EGD today.

## 2020-07-09 NOTE — ASSESSMENT & PLAN NOTE
Based on review of labs from Care Everywhere, the patient has chronically decompensated liver cirrhosis due to alcohol abuse with h/o varices and ascites.   Acute presentation could be secondary GI bleed; however, WBC count also elevated so need to rule out infection. Will order blood cultures and dx paracentesis. In the differential would also include acute alcoholic hepatitis (DF 40.2).   Agree with US of RUQ.   Patient confused. Not sure if HE, metabolic or related to other process. Will start with Lactulose enema x 1, then can change to oral if able to tolerate oral intake.   Monitor INR and CMP daily.     MELD-Na score: 32 at 7/9/2020  4:08 AM  MELD score: 32 at 7/9/2020  4:08 AM  Calculated from:  Serum Creatinine: 2.1 mg/dL at 7/9/2020  4:08 AM  Serum Sodium: 136 mmol/L at 7/9/2020  4:08 AM  Total Bilirubin: 14.9 mg/dL at 7/9/2020  4:08 AM  INR(ratio): 2.0 at 7/8/2020  6:52 PM  Age: 44 years 6 months

## 2020-07-09 NOTE — PROGRESS NOTES
Admitted from ED via stretcher. Awake and alert. Confused, but oriented.  Hallucinating at times, believes she hears a baby in her room and thought she saw her daughter outside her doorway.  Vitals stable. Blood drawn for labs. Heart RRR.  BBS CTA. Sats wnl on 2L nc.  Abd. Large, rounded, and distended. BS hypoactive x 4 quads.  Per ED RN had Dark red BM prior to arrival to ICU.  New orders noted to transfuse 2 units PRBC's.  No CBC available.  Was drawn x 3 in ED and LAB said was too diluted to run, suspected low H/H d/t clinical presentation.  Alicea CYU - dark chrissy noted.

## 2020-07-09 NOTE — ASSESSMENT & PLAN NOTE
Patient has chronic macrocytic anemia. Suspect current levels in part related to GI bleed.   Monitor and transfuse.   See above.

## 2020-07-09 NOTE — ASSESSMENT & PLAN NOTE
Per review of history, extensive history of alcohol abuse and cirrhosis of the liver.  Discussion with daughter she reports heavy drinking over this weekend.    --case management to assist with possible rehab placement at discharge

## 2020-07-09 NOTE — ASSESSMENT & PLAN NOTE
Transfuse blood products  BP stable  Follow up BMP  Accurate I/Os  Renal following:   Likely due to severe anemia (Hgb was initially undetectable and increased to 3.8 after 2 units of blood). Creatinine was 0.8 on 4/15/19. Creatinine has increased to 2.1 today. She made 150 cc of urine overnight. Avoid NSAIDs, ACE-I, ARB. No indication for dialysis at present. Will monitor closely.

## 2020-07-09 NOTE — MEDICAL/APP STUDENT
[facility]  Progress Note    Patient Name: John Wray  MRN: [id number]  Patient Class: IP- Inpatient   Admission Date: 7/8/2020  Length of Stay: 0 days  Attending Physician: Holger Flower MD  Primary Care Provider: No primary care provider on file.    Subjective:   Principal problem: GI hemorrhage w/ melena    HPI: Ms. Wray is 43 yo female w/ a PMHx of liver cirrhosis secondary to alcohol use disorder and hepatitis, MDD, and pancreatitis who presented to the ED on 7/8 due to worsening abdominal distension and SOB. Pt also described dark tarry stools consistent with melena. Per ED, pt's hgb was undetectable on arrival so she received 1.5 units of packed RBCs, which brought her hbg up to 3.8 and hct to 12.9. The rest of pt's CBC showed WBC 39541 and plts 4900. INR and aPTT were both elevated despite the pt not being on anticoagulants. Cr 1.6. Metabolic acidosis w/ increased AG. Total bilirubin 14.9. AST/ALT ratio >2. Ammonia 65. BNP 2829. Troponin 0.092. Stool occult blood test positive. Pt was hemodynamically stable in the ED and was admitted for presumptive dx of GI bleed w/ melena, severe symptomatic anemia, and decompensated liver cirrhosis.     Interval history: Pt had no acute events overnight. Pt received 2 units of plts and 1 unit FFP. Pt had some dark red stools overnight. Pt said her SOB has improved and she is feeling a bit more comfortable. Pt mentioned she has a history of bleeding but denied any history of blood clots.     Review of Systems   Constitutional:        ROS was limited by AMS   HENT: Negative.    Eyes: Negative.    Respiratory: Positive for shortness of breath.    Cardiovascular: Negative for chest pain and palpitations.   Gastrointestinal: Positive for blood in stool, diarrhea and melena.   Genitourinary: Negative.    Musculoskeletal: Negative.    Skin: Negative.    Neurological: Negative.    Endo/Heme/Allergies: Bruises/bleeds easily.        No history of blood clots    Psychiatric/Behavioral: Negative.      Past Medical History:   Diagnosis Date    Alcohol abuse     Cirrhosis     Depression     Hypertension     Portal hypertension with esophageal varices      Past Surgical History:   Procedure Laterality Date     SECTION      ESOPHAGOGASTRODUODENOSCOPY         Objective:     Vitals  Temp:  [97.9 °F (36.6 °C)-98.6 °F (37 °C)] 98.3 °F (36.8 °C)  Pulse:  [] 96  Resp:  [18-37] 29  SpO2:  [96 %-100 %] 100 %  BP: (115-167)/() 150/61    Physical Exam   Constitutional: She appears jaundiced. She appears unhealthy.   Eyes: Scleral icterus is present.   Cardiovascular: Normal rate and regular rhythm.   Pulmonary/Chest: She has wheezes.   Pt seemed to be a bit breathless when speaking   Abdominal: She exhibits distension and ascites. There is no abdominal tenderness.   Musculoskeletal:         General: Edema present.      Comments: Pitting edema lower extremities    Neurological:   AMS; No asterixis   Skin: Skin is warm and dry.     Labs  WBCs 14.63  H&H 3.8 & 12.9    Plts 49   PT/INR 20.5/2   APTT 34.7  CO2 10  AG 23  BUN 25  Cr 2.1  Phosph 4.7  Mg 1.1  Total bili 14.9  Direct bili 10.1  AST//42  Ammonia 65  BNP 2829  Trop 0.092  Alc < 10   Positive stool occult blood    Imaging  Abd US: hepatosplenomegaly w/ fatty infiltration of the liver; evidence of portal HTN; trace periphepatic and perisplenic ascites  XCR: reticular and ground glass interstitial changes; enlarged cardiac silhouette      Inpatient Medications     0.9%  NaCl infusion (for blood administration), , Intravenous, Q24H PRN    lactulose 10 gram/15 mL solution (enema) 200 g, 200 g, Rectal, TID    lactulose 20 gram/30 mL solution Soln 20 g, 20 g, Oral, BID    magnesium sulfate 2g in water 50mL IVPB (premix), 2 g, Intravenous, Once    morphine injection 2 mg, 2 mg, Intravenous, Q4H PRN    nicotine 21 mg/24 hr 1 patch, 1 patch, Transdermal, Daily    octreotide (SANDOSTATIN) 500  mcg in sodium chloride 0.9% 100 mL infusion, 50 mcg/hr, Intravenous, Continuous    ondansetron injection 4 mg, 4 mg, Intravenous, Q8H PRN    pantoprazole injection 40 mg, 40 mg, Intravenous, BID    Assessment/Plan:     GI hemorrhage w/ melena  Continue octreotide  Closely monitor VSS   CBC q8h  Endoscopy tomorrow AM  Rocephin 2 g daily  Consult GI    Liver Cirrhosis   Hepatitis panel   Daily CMP    Consult GI    Coagulopathy  R/o DIC, TTP  Daily CMP  Vitamin K administration    Severe Anemia  Draw CBC after third unit of PRBCs is administered   Closely monitor H&H  Check folate and B12    Leukocytosis  Monitor WBCs and VSS closely   Blood cultures x 2    SUNG  Alicea catheter in-place  Monitor urine output  Check FeNa  Consult nephrology    Increased AG metabolic acidosis  Check lactic acid  Should improve w/ improving anemia    Acute hepatic encephalopathy   Continue lactulose  Monitor for improvement    Elevated BNP  Echo showed EF of 55%, LVH, mild mitral regurgitation, RV pressure overload, and pulmonary hypertension  Furosemide 80 mg  Consult cardiology    Alcohol use disorder  Monitor for signs of alcohol withdrawal   Benzo PRN for DT  Check thiamine level    Active Diagnoses:    Diagnosis Date Noted POA    PRINCIPAL PROBLEM:  Gastrointestinal hemorrhage with melena [K92.1] 07/09/2020 Yes    Decompensated HCV cirrhosis [B19.20, K74.69] 07/09/2020 Yes    Coagulopathy [D68.9] 07/09/2020 Yes    SUNG (acute kidney injury) [N17.9] 07/09/2020 Yes    Metabolic acidosis, increased anion gap [E87.2] 07/09/2020 Yes    Severe anemia [D64.9] 07/09/2020 Yes    Encephalopathy [G93.40] 07/09/2020 Yes    Hypomagnesemia [E83.42] 07/09/2020 Yes    Acute renal failure [N17.9]  Yes    SOB (shortness of breath) [R06.02]  Yes      Problems Resolved During this Admission:     VTE Risk Mitigation (From admission, onward)         Ordered     Place sequential compression device  Until discontinued      07/09/20 0000               Critical care time spent on the evaluation and treatment of severe organ dysfunction, review of pertinent labs and imaging studies, discussions with consulting providers and discussions with patient/family: 40 minutes.     Mya Lara MS3  [facility]

## 2020-07-09 NOTE — CONSULTS
"  [facility] -   Adult Nutrition  Consult Note    SUMMARY     Recommendations    Recommendation:   1. Progress diet as tolerated, adding Boost Breeze 3x/day.   2. B-complex daily   3. RD to f/u  Goals: Meet >50% meal intake by RD f/u  Nutrition Goal Status: new  Communication of RD Recs: POC, sticky note     Reason for Assessment    Reason For Assessment: consult  Diagnosis: (GI bleed, SUNG)  Relevant Medical History: hypertension, cirrhosis, alcoholism  Interdisciplinary Rounds: attended    General Information Comments:   7/9/20 Pt presented yesterday to ER with bloody diarrhea. She was severely anemic and had AMS. She is now in ICU, still confused. A clear liquid diet was just ordered. She has ascites, but not enough for paracentesis, per GI. She is in metabolic acidosis, expected to improve as she receives blood transfusions. She will have a lactulose enema for impaired liver function. She has alcoholism. Due to recent hospital wide restrictions to limit the transfer of (COVID-19), we are not performing any physical exams at this time. All S/S will be observational; NFPE to be performed at a future date.  Nutrition Discharge Planning: cardiac diet    Nutrition Risk Screen    Nutrition Risk Screen: other (see comments)(GI bleed)    Nutrition/Diet History    Food Allergies: NKFA  Factors Affecting Nutritional Intake: excessive alcohol intake, diarrhea, altered gastrointestinal function, clear liquid diet, impaired cognitive status/motor control    Anthropometrics    Temp: 98.3 °F (36.8 °C)  Height: 5' 7" (170.2 cm)  Height (inches): 67 in  Weight Method: Bed Scale  Weight: 83.9 kg (185 lb)  Weight (lb): 185 lb  Ideal Body Weight (IBW), Female: 135 lb  % Ideal Body Weight, Female (lb): 137.04 %  BMI (Calculated): 29  BMI Grade: 25 - 29.9 - overweight       Lab/Procedures/Meds  Pertinent Labs: reviewed  Magnesium 1.1, Phos 4.7, EGFR 33, BUN 25, CR 2.1, Hemoglobin 3.8, Ammonia 65, Bilirubin 10.1, Albumin " 2.4   Pertinent Medications: reviewed  Lasix, lactulose, Magnesium sulfate, pantoprazole, Vitamin K    Physical Findings/Assessment     Ascites     Estimated/Assessed Needs    Weight Used For Calorie Calculations: 83.9 kg (184 lb 15.5 oz)  Energy Calorie Requirements (kcal): 1850- 2150  Energy Need Method: [place]-St Jeor(x 1.2- 1.4)  Protein Requirements: 85- 100g(1-1.2g/kg per GI disorder)  Weight Used For Protein Calculations: 83.9 kg (184 lb 15.5 oz)     Estimated Fluid Requirement Method: RDA Method(or per MD)  RDA Method (mL): 1850     Nutrition Prescription Ordered    Current Diet Order: Clear liquid    Evaluation of Received Nutrient/Fluid Intake       Intake/Output Summary (Last 24 hours) at 7/9/2020 1026  Last data filed at 7/9/2020 0715  Gross per 24 hour   Intake 1099.37 ml   Output 155 ml   Net 944.37 ml     % Intake of Estimated Energy Needs: 0 - 25 %  % Meal Intake: Other: Diet just ordered    Nutrition Risk    2x week    Assessment and Plan    Nutrition Problem  Altered GI Function    Related to (etiology):   Alteration in gastrointestinal tract  function    Signs and Symptoms (as evidenced by):   Abdominal distension, anemia, bloody diarrhea    Interventions:  Collaboration with other providers    Nutrition Diagnosis Status:   New    Monitor and Evaluation    Food and Nutrient Intake: food and beverage intake  Food and Nutrient Adminstration: diet order  Anthropometric Measurements: weight  Biochemical Data, Medical Tests and Procedures: electrolyte and renal panel, gastrointestinal profile  Nutrition-Focused Physical Findings: overall appearance     Malnutrition Assessment  Due to recent hospital wide restrictions to limit the transfer of (COVID-19), we are not performing any physical exams at this time. All S/S will be observational; NFPE to be performed at a future date. No weight history available per epic, and pt has altered mental status, so difficult to establish history. She does not appear  depleted. Will continue to monitor.   Nutrition Follow-Up    RD Follow-up?: Yes    Thanks!  Danielle East MPH RD LDN

## 2020-07-09 NOTE — PLAN OF CARE
Recommendations    Recommendation:   1. Progress diet as tolerated, adding Boost Breeze 3x/day.   2. B-complex daily   3. RD to f/u  Goals: Meet >50% meal intake by RD f/u  Nutrition Goal Status: new  Communication of RD Recs: POC, sticky note

## 2020-07-09 NOTE — ASSESSMENT & PLAN NOTE
INR 2.0, not on oral anticoagulation.  Coagulopathy secondary to decompensated liver cirrhosis.  Vitamin K 10 mg IV x1.

## 2020-07-09 NOTE — CONSULTS
[facility]   Nephrology  Consult Note          Patient Name: John Wray  MRN: [id number]  Admission Date: 2020  Hospital Length of Stay: 0 days  Attending Provider: Holger Flower MD   Primary Care Physician: No primary care provider on file.  Principal Problem:Gastrointestinal hemorrhage with melena    Consults  Subjective:     HPI: 44 year old female with HTN, portal hypertension, EtOH abuse, liver cirrhosis, Hep C presents to [facility] with severe GI bleed. Patient s/p blood transfusion with 2 units of PRBC in ER. Hgb was 3.8 following transfusion. She is receiving 3rd PRBC at present.  Patient also presents with SUNG and creatinine of 2.1 and metabolic acidosis.    Nephrology was consulted to help with patient's renal care while she is admitted at [facility]. I saw and examined patient in her hospital room. Patient reports that SOB and weakness have improved. She reports diarrhea yesterday. No nausea/vomiting. No pain at present, no LE edema. She made about 150 cc of urine overnight (recorded).     Chart review revealed creatinine of 0.8 on 4/15/19.     Past Medical History:   Diagnosis Date    Alcohol abuse     Cirrhosis     Depression     Hypertension     Portal hypertension with esophageal varices        Past Surgical History:   Procedure Laterality Date     SECTION      ESOPHAGOGASTRODUODENOSCOPY         Review of patient's allergies indicates:  No Known Allergies  Current Facility-Administered Medications   Medication Frequency    0.9%  NaCl infusion (for blood administration) Q24H PRN    0.9%  NaCl infusion (for blood administration) Q24H PRN    lactulose 10 gram/15 mL solution (enema) 200 g TID    lactulose 20 gram/30 mL solution Soln 20 g BID    magnesium sulfate 2g in water 50mL IVPB (premix) Once    morphine injection 2 mg Q4H PRN    ondansetron injection 4 mg Q8H PRN    pantoprazole injection 40 mg BID     Family History     None        Tobacco Use    Smoking status: Current  Every Day Smoker     Packs/day: 0.50   Substance and Sexual Activity    Alcohol use: Not Currently    Drug use: Never    Sexual activity: Not on file     Review of Systems   Constitutional: Negative for fatigue and fever.   HENT: Negative for congestion.    Eyes: Negative for visual disturbance.   Respiratory: Positive for shortness of breath. Negative for cough and wheezing.    Cardiovascular: Negative for chest pain and palpitations.   Gastrointestinal: Positive for diarrhea. Negative for abdominal pain, nausea and vomiting.   Genitourinary: Negative for difficulty urinating and dysuria.   Musculoskeletal: Negative for joint swelling.   Skin: Negative for rash.   Neurological: Positive for weakness. Negative for headaches.     Objective:     Vital Signs (Most Recent):  Temp: 98.3 °F (36.8 °C) (07/09/20 0721)  Pulse: 99 (07/09/20 0721)  Resp: (!) 25 (07/09/20 0721)  BP: (!) 146/40 (07/09/20 0721)  SpO2: 99 % (07/09/20 0721)  O2 Device (Oxygen Therapy): room air (07/09/20 0721) Vital Signs (24h Range):  Temp:  [97.9 °F (36.6 °C)-98.6 °F (37 °C)] 98.3 °F (36.8 °C)  Pulse:  [] 99  Resp:  [18-37] 25  SpO2:  [96 %-100 %] 99 %  BP: (122-167)/() 146/40     Weight: 83.9 kg (185 lb) (07/09/20 0721)  Body mass index is 28.98 kg/m².  Body surface area is 1.99 meters squared.    I/O last 3 completed shifts:  In: 749.4 [Blood:749.4]  Out: 155 [Urine:155]    Physical Exam  Constitutional:       Appearance: She is well-developed.   HENT:      Head: Normocephalic.   Eyes:      Pupils: Pupils are equal, round, and reactive to light.   Neck:      Thyroid: No thyromegaly.   Cardiovascular:      Rate and Rhythm: Normal rate and regular rhythm.      Heart sounds: No friction rub.   Pulmonary:      Effort: Pulmonary effort is normal.      Breath sounds: Normal breath sounds. No wheezing.   Chest:      Chest wall: No tenderness.   Abdominal:      General: Bowel sounds are normal. There is distension.      Palpations:  Abdomen is soft.      Tenderness: There is no abdominal tenderness.   Lymphadenopathy:      Cervical: No cervical adenopathy.   Skin:     General: Skin is warm and dry.      Findings: No rash.   Neurological:      Mental Status: She is alert.      Comments: Patient responds to questions appropriately.          Significant Labs:  Lab Results   Component Value Date    CREATININE 2.1 (H) 2020    BUN 25 (H) 2020     2020    K 4.5 2020     2020    CO2 10 (L) 2020     Lab Results   Component Value Date    CALCIUM 9.0 2020    PHOS 4.7 (H) 2020     Lab Results   Component Value Date    ALBUMIN 2.4 (L) 2020     Lab Results   Component Value Date    WBC 14.63 (H) 2020    HGB 3.8 (LL) 2020    HCT 12.9 (LL) 2020     (H) 2020    PLT 49 (L) 2020     A    Ammonia 65.     Recent Labs   Lab 20  0408   MG 1.1*     Urinalysis  Recent Labs   Lab 20  1932   COLORU Yellow   SPECGRAV 1.020   PHUR 6.0   PROTEINUA Negative   NITRITE Negative   LEUKOCYTESUR Negative   UROBILINOGEN >=8.0*     Recent Labs   Lab 20  1852   TROPONINI 0.092*       Significant Imaging:  Imaging Results          X-Ray Chest AP Portable (Final result)  Result time 20 20:15:47    Final result by Raulito Jacques MD (20 20:15:47)                 Impression:      1.  Reticular and ground-glass interstitial changes present.  Tiny right effusion.  Cardiac silhouette size enlargement.  Interstitial infectious process such as atypical pneumonia difficult to exclude.  Other considerations would include interstitial pulmonary edema.    2.  Incidental findings as noted above.      Electronically signed by: Raulito Jacques MD  Date:    2020  Time:    20:15             Narrative:    EXAMINATION:  XR CHEST AP PORTABLE    CLINICAL HISTORY:  SOB;    COMPARISON:  No comparison studies are available.    FINDINGS:  EKG leads overlie the chest.   Oxygen tubing overlies the chest as well, which is rotated to the left.  Reticular and ground-glass interstitial changes throughout the lungs.  Possible tiny right effusion.  The lungs are otherwise clear.  The cardiac silhouette size is enlarged.  The trachea is midline and the mediastinal width is normal. Negative for left effusion or pneumothorax.  Pulmonary vasculature is congested.  Negative for osseous abnormalities. Tortuous aorta with calcifications of the aortic knob.  Degenerative changes of the spine with convex right curvature.                                  Assessment/Plan:     * Gastrointestinal hemorrhage with melena  GI is following. Continue PPI and octreotide. Blood transfusion as needed.     Hypomagnesemia  Replete magnesium.     Encephalopathy  As per hospitalist. Continue lactulose.     Metabolic acidosis, increased anion gap  AG acidosis. Likely due to lactic acidosis. Expect this to improve with blood transfusion/correction of anemia.     SUNG (acute kidney injury)  Likely due to severe anemia (Hgb was initially undetectable and increased to 3.8 after 2 units of blood). Creatinine was 0.8 on 4/15/19. Creatinine has increased to 2.1 today. She made 150 cc of urine overnight. Avoid NSAIDs, ACE-I, ARB. No indication for dialysis at present. Will monitor closely.     Total ICU time: 40 minutes. Majority of time was needed for chart review and case discussion with ICU team.     Thank you for your consult. I will follow-up with patient. Please contact us if you have any additional questions.    Luther Inman MD   Nephrology  [facility]

## 2020-07-09 NOTE — ED NOTES
Pt remains in bed, blood products transfusing. Pt in no acute distress, respirations even and unlabored, will continue to monitor.

## 2020-07-09 NOTE — ASSESSMENT & PLAN NOTE
Serum creatinine elevated 1.6, likely secondary to gastrointestinal bleeding.  Transfuse 3 units PRBC total.  Repeat labs in a.m..  Consider nephrology consult.

## 2020-07-09 NOTE — ASSESSMENT & PLAN NOTE
CO2 8, anion gap 23.  Check lactic acid.  Unable to aggressively hydrate her due to significant ascites and elevated BNP and shortness of breath.

## 2020-07-09 NOTE — EICU
Brief new admit note:    44 yr old with hx of cirrhosis, hep c, chronic alcohol abuse admitted for GI bleeding/ melena with symptomatic anemia with Hg < 4. Received PRBC.     H and P, labs reviewed.    INR 2. ammonia elevated mild.     Camera:  Talking to bed side RN. No sob. Obese.   MAP >75. . sats fine    A/P:  1. Decompensated liver cirrhosis/Melena/weakness/SUNG-?HRS. Hemodynamically stable. Mentation appear ok.  - follow Hg closely post 3 PRBC  - asp precautions  - follow Urine out put  - GI eval  - non compliance with meds  - on Protonix. Received Vit K  - AGMA from SUNG and cirrhosis  - on SCD.  - EGD today possible.  - nephrology consult.  - received a dose of lasix from ed. Follow K levels.

## 2020-07-09 NOTE — CONSULTS
[facility] -   Hematology/Oncology  Consult Note    Patient Name: John Wray  MRN: [id number]  Admission Date: 7/8/2020  Hospital Length of Stay: 0 days  Code Status: No Order   Attending Provider: Holger Flower MD  Consulting Provider: Kari Toribio NP  Primary Care Physician: No primary care provider on file.  Principal Problem:Gastrointestinal hemorrhage with melena    Inpatient consult to Hematology/Oncology  Consult performed by: Kari Toribio NP  Consult ordered by: Holger Flower MD  Reason for consult: Anemia  Assessment/Recommendations: * Gastrointestinal hemorrhage with melena  Gastroenterology is planning EGD, 3 units PRBC given since admission for admitting hemoglobin of 3.8.  Will need repeat CBC.  Monitor stools for melena.  Continue octreotide.    --daily CBC  --transfuse PRBC for hemoglobin less than 7    Alcohol abuse  Per review of history, extensive history of alcohol abuse and cirrhosis of the liver.  Discussion with daughter she reports heavy drinking over this weekend.    --case management to assist with possible rehab placement at discharge    Acute blood loss anemia  Patient with history of cirrhosis of the liver with esophageal varices per review of chart in Care everywhere.  Extensive history of alcohol abuse, reports of melena in ER.  Patient given 2 units of FFP and 10 mg of vitamin K overnight for elevated INR, given 3 units of PRBC.  GI following and plans for possible EGD.    --daily CBC CMP  --repeat H&H following 3rd unit of PRBC  --transfuse with PRBC for hemoglobin less than 7    SUNG (acute kidney injury)  Likely secondary to volume depletion, will continue to transfuse until hemoglobin greater than 7.  Nephrology following.    --management per Nephrology    Coagulopathy  Secondary to cirrhosis of the liver, elevated INR and APTT on admission, negative for report of taking an anticoagulant.  Given 2 units FFP and 10 mg vitamin K overnight.    --daily CBC to monitor for  bleeding      Decompensated HCV cirrhosis  Management per hepatology          Subjective:     HPI:  44 year old female, presented to the ER with weakness and SOB. Patient is currently confused and unable to provide any history, Hx obtained from the medical record and medical staff. Review of Care Everywhere shows a visit to [facility] on 08/27/19 for alcoholic cirrhosis with h/o esophageal varices, ascites and mild encephalopathy. She had positive HAV IgG and negative HCV RNA. HBV status unknown. 04/2019 US showed small ascites and CT scan showed diffuse fatty infiltration of liver with hepatosplenomegaly and enlarged gastric varices consistent with portal hypertension, and cholelithiasis. Baseline Hgb around 9. LFTs were similar to this admit with T. Bili over 10 and INR below 2.   Hgb: 3.8 after two units of blood, 2 units of FFP and 10mg Vit. K overnight. ER reported dark red stools overnight. No BM in ICU. INR: 2.0. LFT elevated. WBC elevated. Afebrile. No significant tachycardia and BP has been primarily normal to elevated. UA unremarkable. CXR showed possible process on the right. Nurse reports no complaints of pain from patient and no vomiting. Daughter at bedside reports patient continues to drink ETOH heavily, patient denies this.     Oncology Treatment Plan:   [No treatment plan]    Medications:  Continuous Infusions:   octreotide (SANDOSTATIN) infusion       Scheduled Meds:   lactulose  200 g Rectal TID    lactulose  20 g Oral BID    magnesium sulfate IVPB  2 g Intravenous Once    mupirocin   Nasal BID    nicotine  1 patch Transdermal Daily    pantoprazole  40 mg Intravenous BID     PRN Meds:sodium chloride, sodium chloride, morphine, ondansetron, pneumoc 13-ellen conj-dip cr(PF)     Review of patient's allergies indicates:  No Known Allergies     Past Medical History:   Diagnosis Date    Alcohol abuse     Cirrhosis     Depression     Hypertension     Portal hypertension with esophageal  varices      Past Surgical History:   Procedure Laterality Date     SECTION      ESOPHAGOGASTRODUODENOSCOPY       Family History     None        Tobacco Use    Smoking status: Current Every Day Smoker     Packs/day: 0.50   Substance and Sexual Activity    Alcohol use: Not Currently    Drug use: Never    Sexual activity: Not on file       Review of Systems   Reason unable to perform ROS: Patient confused and unable to answer questions appropriately.     Objective:     Vital Signs (Most Recent):  Temp: 98.3 °F (36.8 °C) (20 0721)  Pulse: 97 (20 1131)  Resp: (!) 22 (20 1131)  BP: (!) 150/61 (20 1130)  SpO2: 99 % (20 1131) Vital Signs (24h Range):  Temp:  [97.9 °F (36.6 °C)-98.6 °F (37 °C)] 98.3 °F (36.8 °C)  Pulse:  [] 97  Resp:  [18-37] 22  SpO2:  [96 %-100 %] 99 %  BP: (115-167)/() 150/61     Weight: 83.9 kg (185 lb)  Body mass index is 28.98 kg/m².  Body surface area is 1.99 meters squared.      Intake/Output Summary (Last 24 hours) at 2020 1247  Last data filed at 2020 1151  Gross per 24 hour   Intake 2174.02 ml   Output 155 ml   Net 2019.02 ml       Physical Exam  Vitals signs and nursing note reviewed. Exam conducted with a chaperone present (daughter).   Constitutional:       General: She is not in acute distress.     Appearance: She is ill-appearing.   HENT:      Head: Normocephalic and atraumatic.      Right Ear: Hearing and external ear normal.      Left Ear: Hearing and external ear normal.      Nose: No rhinorrhea.      Right Sinus: No maxillary sinus tenderness or frontal sinus tenderness.      Left Sinus: No maxillary sinus tenderness or frontal sinus tenderness.      Mouth/Throat:      Mouth: No oral lesions.      Pharynx: Uvula midline.   Eyes:      General:         Right eye: No discharge.         Left eye: No discharge.      Conjunctiva/sclera: Conjunctivae normal.      Pupils: Pupils are equal, round, and reactive to light.   Neck:       Musculoskeletal: Normal range of motion.      Thyroid: No thyromegaly.      Vascular: No carotid bruit.      Trachea: No tracheal deviation.   Cardiovascular:      Rate and Rhythm: Regular rhythm. Tachycardia present.      Pulses:           Dorsalis pedis pulses are 1+ on the right side and 1+ on the left side.      Heart sounds: S1 normal and S2 normal. Murmur present.   Pulmonary:      Effort: Pulmonary effort is normal. No respiratory distress.      Breath sounds: Examination of the right-middle field reveals rales. Examination of the left-middle field reveals rales. Examination of the right-lower field reveals decreased breath sounds. Examination of the left-lower field reveals decreased breath sounds. Decreased breath sounds and rales present.   Abdominal:      General: Bowel sounds are decreased. There is distension.      Palpations: There is fluid wave. There is no mass.      Tenderness: There is generalized abdominal tenderness.   Musculoskeletal: Normal range of motion.   Lymphadenopathy:      Cervical: No cervical adenopathy.      Upper Body:      Right upper body: No supraclavicular adenopathy.      Left upper body: No supraclavicular adenopathy.   Skin:     General: Skin is warm and dry.      Capillary Refill: Capillary refill takes less than 2 seconds.      Findings: No rash.   Neurological:      Mental Status: She is lethargic and confused.      Sensory: No sensory deficit.   Psychiatric:         Mood and Affect: Mood is anxious. Affect is labile.         Speech: Speech is slurred.         Behavior: Behavior is uncooperative.         Cognition and Memory: Cognition is impaired. Memory is impaired.      Comments: Patient reports distant history of ETOH consumption daughter at bedside reports patient did not drink yesterday due to lack of money but was drinking heavily over the weekend.          Significant Labs:   CBC:   Recent Labs   Lab 07/09/20  0408   WBC 14.63*   HGB 3.8*   HCT 12.9*   PLT 49*     and CMP:   Recent Labs   Lab 07/08/20  1852 07/09/20  0408   * 136   K 4.0 4.5    103   CO2 8* 10*   GLU 84 93   BUN 22* 25*   CREATININE 1.6* 2.1*   CALCIUM 9.2 9.0   PROT 9.2* 9.2*   ALBUMIN 2.2* 2.4*   BILITOT 14.9* 14.9*   ALKPHOS 154* 135   * 226*   ALT 44 42   ANIONGAP 23* 23*   EGFRNONAA 39* 28*       Diagnostic Results:  I have reviewed all pertinent imaging results/findings within the past 24 hours.    Assessment/Plan:     * Gastrointestinal hemorrhage with melena  Gastroenterology is planning EGD, 3 units PRBC given since admission for admitting hemoglobin of 3.8.  Will need repeat CBC.  Monitor stools for melena.  Continue octreotide.    --daily CBC  --transfuse PRBC for hemoglobin less than 7    Alcohol abuse  Per review of history, extensive history of alcohol abuse and cirrhosis of the liver.  Discussion with daughter she reports heavy drinking over this weekend.    --case management to assist with possible rehab placement at discharge    Acute blood loss anemia  Patient with history of cirrhosis of the liver with esophageal varices per review of chart in Care everywhere.  Extensive history of alcohol abuse, reports of melena in ER.  Patient given 2 units of FFP and 10 mg of vitamin K overnight for elevated INR, given 3 units of PRBC.  GI following and plans for possible EGD.    --daily CBC CMP  --repeat H&H following 3rd unit of PRBC  --transfuse with PRBC for hemoglobin less than 7    SUNG (acute kidney injury)  Likely secondary to volume depletion, will continue to transfuse until hemoglobin greater than 7.  Nephrology following.    --management per Nephrology    Coagulopathy  Secondary to cirrhosis of the liver, elevated INR and APTT on admission, negative for report of taking an anticoagulant.  Given 2 units FFP and 10 mg vitamin K overnight.    --daily CBC to monitor for bleeding      Decompensated HCV cirrhosis  Management per hepatology        Thank you for your consult. I  will follow-up with patient. Please contact us if you have any additional questions.    Kari Toribio NP  Hematology/Oncology  [facility] [place]

## 2020-07-09 NOTE — ASSESSMENT & PLAN NOTE
Transfusing 3 units PRBCs now  Repeat CBC post transfusion  No active bleeding since admit  Hemodynamically stable  Follow H/H  GI following planning EGD

## 2020-07-09 NOTE — SUBJECTIVE & OBJECTIVE
Past Medical History:   Diagnosis Date    Alcohol abuse     Cirrhosis     Depression     Hypertension     Portal hypertension with esophageal varices        Past Surgical History:   Procedure Laterality Date     SECTION      ESOPHAGOGASTRODUODENOSCOPY         Review of patient's allergies indicates:  No Known Allergies    Family History     None        Tobacco Use    Smoking status: Current Every Day Smoker     Packs/day: 0.50   Substance and Sexual Activity    Alcohol use: Not Currently    Drug use: Never    Sexual activity: Not on file         Review of Systems   Unable to perform ROS: Mental status change     Objective:     Vital Signs (Most Recent):  Temp: 98.3 °F (36.8 °C) (20 0721)  Pulse: 97 (20 1131)  Resp: (!) 22 (20 1131)  BP: (!) 150/61 (20 1130)  SpO2: 99 % (20 1131) Vital Signs (24h Range):  Temp:  [97.9 °F (36.6 °C)-98.6 °F (37 °C)] 98.3 °F (36.8 °C)  Pulse:  [] 97  Resp:  [18-37] 22  SpO2:  [96 %-100 %] 99 %  BP: (115-167)/() 150/61     Weight: 83.9 kg (185 lb)  Body mass index is 28.98 kg/m².      Intake/Output Summary (Last 24 hours) at 2020 1237  Last data filed at 2020 1151  Gross per 24 hour   Intake 2174.02 ml   Output 155 ml   Net 2019.02 ml       Physical Exam  Constitutional:       General: She is awake. She is not in acute distress.     Appearance: She is well-developed and overweight. She is not ill-appearing or toxic-appearing.      Interventions: She is not intubated.  HENT:      Head: Normocephalic and atraumatic.      Mouth/Throat:      Mouth: Mucous membranes are moist.   Eyes:      General: Lids are normal. Scleral icterus present.      Pupils: Pupils are equal, round, and reactive to light.   Neck:      Musculoskeletal: Normal range of motion.      Vascular: No carotid bruit.      Trachea: Trachea normal.   Cardiovascular:      Rate and Rhythm: Normal rate and regular rhythm.      Pulses:           Radial pulses are  2+ on the right side and 2+ on the left side.        Dorsalis pedis pulses are 1+ on the right side and 1+ on the left side.      Heart sounds: Normal heart sounds.   Pulmonary:      Effort: Pulmonary effort is normal. No accessory muscle usage or respiratory distress. She is not intubated.      Breath sounds: Normal breath sounds.   Abdominal:      General: Bowel sounds are decreased. There is distension (mild to moderate).      Palpations: Abdomen is soft. There is fluid wave.      Tenderness: There is no abdominal tenderness.   Genitourinary:     Comments: [place] in place  Musculoskeletal: Normal range of motion.      Right lower le+ Edema present.      Left lower le+ Edema present.      Right foot: No deformity.      Left foot: No deformity.   Lymphadenopathy:      Cervical: No cervical adenopathy.   Skin:     General: Skin is warm and dry.      Capillary Refill: Capillary refill takes less than 2 seconds.      Findings: No rash.   Neurological:      Mental Status: She is alert. She is confused.      Motor: Motor function is intact.      Coordination: Coordination is intact.      Comments: Orientation to person and time but not place   Psychiatric:         Mood and Affect: Mood is anxious.         Speech: Speech normal.         Behavior: Behavior is hyperactive (mild).         Thought Content: Thought content is delusional.         Cognition and Memory: She exhibits impaired recent memory.         Vents:  Oxygen Concentration (%): 32 (20 0532)    Lines/Drains/Airways     Drain                 Urethral Catheter 20 0130 Latex 16 Fr. less than 1 day          Peripheral Intravenous Line                 Peripheral IV - Single Lumen 20 1849 20 G Right Antecubital less than 1 day         Peripheral IV - Single Lumen 20 0055 18 G Left Antecubital less than 1 day                Significant Labs:    CBC/Anemia Profile:  Recent Labs   Lab 20  2346 20  0408   WBC  --  14.63*   HGB   --  3.8*   HCT  --  12.9*   PLT  --  49*   MCV  --  115*   RDW  --  23.9*   OCCULTBLOOD Positive*  --         Chemistries:  Recent Labs   Lab 07/08/20 1852 07/09/20  0408   * 136   K 4.0 4.5    103   CO2 8* 10*   BUN 22* 25*   CREATININE 1.6* 2.1*   CALCIUM 9.2 9.0   ALBUMIN 2.2* 2.4*   PROT 9.2* 9.2*   BILITOT 14.9* 14.9*   ALKPHOS 154* 135   ALT 44 42   * 226*   MG  --  1.1*   PHOS  --  4.7*       Coagulation:   Recent Labs   Lab 07/08/20 1852 07/09/20  1007   INR 2.0* 2.1*   APTT 34.7*  --      Urine Studies:   Recent Labs   Lab 07/08/20  1932   COLORU Yellow   APPEARANCEUA Clear   PHUR 6.0   SPECGRAV 1.020   PROTEINUA Negative   GLUCUA Negative   KETONESU Trace*   BILIRUBINUA 3+*   OCCULTUA Negative   NITRITE Negative   UROBILINOGEN >=8.0*   LEUKOCYTESUR Negative     All pertinent labs within the past 24 hours have been reviewed.  BNP 2829, NH3 65    Significant Imaging:   CXR: I have reviewed all pertinent results/findings within the past 24 hours and my personal findings are:  mild interstitial infiltrates

## 2020-07-09 NOTE — CONSULTS
[facility] -   Critical Care Medicine  Consult Note    Patient Name: John Wray  MRN: [id number]  Admission Date: 2020  Hospital Length of Stay: 0 days  Code Status: No Order  Attending Physician: Holger Flower MD   Primary Care Provider: No primary care provider on file.   Principal Problem: Gastrointestinal hemorrhage with melena      Subjective:     HPI:  Ms Wray is a 43 yo obese BF with a PMH of daily etoh use, Cirrhosis, Ascites, MDD, Hep C, HTN and tobacco abuse.  She presented to the ED on  due to worsening abdominal distension and SOB. Pt also described dark tarry stools consistent with melena. Per ED, pt's hgb was undetectable on arrival so she received 1.5 units of packed RBCs, which brought her hbg up to 3.8 and hct to 12.9. The rest of pt's CBC showed WBC 59434 and plts 4900. INR 2, Cr 1.6, CO2 8, AG 23, Mg 1.1,Total bilirubin 14.9. AST/ALT ratio >2. Ammonia 65. BNP 2829. Troponin 0.092. Stool occult blood test positive. Pt was hemodynamically stable in the ED and was admitted to ICU for presumptive dx of GI bleed w/ melena, severe symptomatic anemia, and decompensated liver cirrhosis with GI consult requested.     Hospital/ICU Course:   - This AM patient fully awake, alert and responsive but very confused and restless at times requiring frequent redirection.  She is receiving PRBCs.  She had melena at home and BRBPR in ED but no rectal bleeding since admit overnight.  VSS and she is in no distress talking on cell phone with daughter.     Past Medical History:   Diagnosis Date    Alcohol abuse     Cirrhosis     Depression     Hypertension     Portal hypertension with esophageal varices        Past Surgical History:   Procedure Laterality Date     SECTION      ESOPHAGOGASTRODUODENOSCOPY         Review of patient's allergies indicates:  No Known Allergies    Family History     None        Tobacco Use    Smoking status: Current Every Day Smoker     Packs/day: 0.50    Substance and Sexual Activity    Alcohol use: Not Currently    Drug use: Never    Sexual activity: Not on file         Review of Systems   Unable to perform ROS: Mental status change     Objective:     Vital Signs (Most Recent):  Temp: 98.3 °F (36.8 °C) (07/09/20 0721)  Pulse: 97 (07/09/20 1131)  Resp: (!) 22 (07/09/20 1131)  BP: (!) 150/61 (07/09/20 1130)  SpO2: 99 % (07/09/20 1131) Vital Signs (24h Range):  Temp:  [97.9 °F (36.6 °C)-98.6 °F (37 °C)] 98.3 °F (36.8 °C)  Pulse:  [] 97  Resp:  [18-37] 22  SpO2:  [96 %-100 %] 99 %  BP: (115-167)/() 150/61     Weight: 83.9 kg (185 lb)  Body mass index is 28.98 kg/m².      Intake/Output Summary (Last 24 hours) at 7/9/2020 1237  Last data filed at 7/9/2020 1151  Gross per 24 hour   Intake 2174.02 ml   Output 155 ml   Net 2019.02 ml       Physical Exam  Constitutional:       General: She is awake. She is not in acute distress.     Appearance: She is well-developed and overweight. She is not ill-appearing or toxic-appearing.      Interventions: She is not intubated.  HENT:      Head: Normocephalic and atraumatic.      Mouth/Throat:      Mouth: Mucous membranes are moist.   Eyes:      General: Lids are normal. Scleral icterus present.      Pupils: Pupils are equal, round, and reactive to light.   Neck:      Musculoskeletal: Normal range of motion.      Vascular: No carotid bruit.      Trachea: Trachea normal.   Cardiovascular:      Rate and Rhythm: Normal rate and regular rhythm.      Pulses:           Radial pulses are 2+ on the right side and 2+ on the left side.        Dorsalis pedis pulses are 1+ on the right side and 1+ on the left side.      Heart sounds: Normal heart sounds.   Pulmonary:      Effort: Pulmonary effort is normal. No accessory muscle usage or respiratory distress. She is not intubated.      Breath sounds: Normal breath sounds.   Abdominal:      General: Bowel sounds are decreased. There is distension (mild to moderate).      Palpations:  Abdomen is soft. There is fluid wave.      Tenderness: There is no abdominal tenderness.   Genitourinary:     Comments: Alicea in place  Musculoskeletal: Normal range of motion.      Right lower le+ Edema present.      Left lower le+ Edema present.      Right foot: No deformity.      Left foot: No deformity.   Lymphadenopathy:      Cervical: No cervical adenopathy.   Skin:     General: Skin is warm and dry.      Capillary Refill: Capillary refill takes less than 2 seconds.      Findings: No rash.   Neurological:      Mental Status: She is alert. She is confused.      Motor: Motor function is intact.      Coordination: Coordination is intact.      Comments: Orientation to person and time but not place   Psychiatric:         Mood and Affect: Mood is anxious.         Speech: Speech normal.         Behavior: Behavior is hyperactive (mild).         Thought Content: Thought content is delusional.         Cognition and Memory: She exhibits impaired recent memory.         Vents:  Oxygen Concentration (%): 32 (20 0532)    Lines/Drains/Airways     Drain                 Urethral Catheter 20 0130 Latex 16 Fr. less than 1 day          Peripheral Intravenous Line                 Peripheral IV - Single Lumen 20 1849 20 G Right Antecubital less than 1 day         Peripheral IV - Single Lumen 20 0055 18 G Left Antecubital less than 1 day                Significant Labs:    CBC/Anemia Profile:  Recent Labs   Lab 20  2346 20  0408   WBC  --  14.63*   HGB  --  3.8*   HCT  --  12.9*   PLT  --  49*   MCV  --  115*   RDW  --  23.9*   OCCULTBLOOD Positive*  --         Chemistries:  Recent Labs   Lab 20  1852 20  0408   * 136   K 4.0 4.5    103   CO2 8* 10*   BUN 22* 25*   CREATININE 1.6* 2.1*   CALCIUM 9.2 9.0   ALBUMIN 2.2* 2.4*   PROT 9.2* 9.2*   BILITOT 14.9* 14.9*   ALKPHOS 154* 135   ALT 44 42   * 226*   MG  --  1.1*   PHOS  --  4.7*       Coagulation:   Recent  Labs   Lab 07/08/20  1852 07/09/20  1007   INR 2.0* 2.1*   APTT 34.7*  --      Urine Studies:   Recent Labs   Lab 07/08/20  1932   COLORU Yellow   APPEARANCEUA Clear   PHUR 6.0   SPECGRAV 1.020   PROTEINUA Negative   GLUCUA Negative   KETONESU Trace*   BILIRUBINUA 3+*   OCCULTUA Negative   NITRITE Negative   UROBILINOGEN >=8.0*   LEUKOCYTESUR Negative     All pertinent labs within the past 24 hours have been reviewed.  BNP 2829, NH3 65    Significant Imaging:   CXR: I have reviewed all pertinent results/findings within the past 24 hours and my personal findings are:  mild interstitial infiltrates    Assessment/Plan:     Neuro  Encephalopathy, metabolic  Likely multifactorial: Anemia, Met acidosis, elevated ammonia and SUNG  Transfuse  Add Lactulose  Bicarb infusion  Follow up labs    Psychiatric  Alcohol abuse  Admit etoh < 10  Encouraged etoh cessation    Renal/  Hypomagnesemia  Replaced Mg  Follow up labs    SUNG (acute kidney injury)  Transfuse blood products  BP stable  Follow up BMP  Accurate I/Os  Renal following:   Likely due to severe anemia (Hgb was initially undetectable and increased to 3.8 after 2 units of blood). Creatinine was 0.8 on 4/15/19. Creatinine has increased to 2.1 today. She made 150 cc of urine overnight. Avoid NSAIDs, ACE-I, ARB. No indication for dialysis at present. Will monitor closely.     Metabolic acidosis, increased anion gap  Cont bicarb infusion  Follow up labs    Hematology  Coagulopathy  Secondary to decompensated cirrhosis  Also with thrombocytopenia  Transfuse FFP and Plts    Oncology  Acute blood loss anemia  Transfusing 3 units PRBCs now  Repeat CBC post transfusion  No active bleeding since admit  Hemodynamically stable  Follow H/H  GI following planning EGD    GI  * Gastrointestinal hemorrhage with melena  No active bleeding since admit  Plan EGD once transfusions complete and more stable  GI following:   Due to tolerance of severely low Hgb and relatively stable vitals,  acute or active GI bleed unlikely. However, we can't rule out chronic bleeding. Will plan on EGD once patient is more stable (Hgb above 7, INR around 1.5) We will also need to see who can consent for her if she remains confused.   Protonix IV 40 mg bid.   Presentation doesn't support variceal bleed but she has a history of variceal bleed so will bolus Octreotide and follow with infusion.   She will also need Rocephin 2 g daily.    Continue to monitor H/H closely. Agree with additional units of blood.       Decompensated HCV cirrhosis  GI following:   Based on review of labs from Care Everywhere, the patient has chronically decompensated liver cirrhosis due to alcohol abuse with h/o varices and ascites.   Acute presentation could be secondary GI bleed; however, WBC count also elevated so need to rule out infection. Will order blood cultures and dx paracentesis. In the differential would also include acute alcoholic hepatitis (DF 40.2).   Agree with US of RUQ.   Patient confused. Not sure if HE, metabolic or related to other process. Will start with Lactulose enema x 1, then can change to oral if able to tolerate oral intake.   Monitor INR and CMP daily.   MELD-Na score: 32 at 7/9/2020  4:08 AM  MELD score: 32 at 7/9/2020  4:08 AM  Calculated from:  Serum Creatinine: 2.1 mg/dL at 7/9/2020  4:08 AM  Serum Sodium: 136 mmol/L at 7/9/2020  4:08 AM  Total Bilirubin: 14.9 mg/dL at 7/9/2020  4:08 AM  INR(ratio): 2.0 at 7/8/2020  6:52 PM  Age: 44 years 6 months           Preventive Measures and Monitoring:   Stress Ulcer: PPI  Nutrition: NPO for EGD  Glucose control:  stable  Bowel prophylaxis: active GI bleed.  On Lactulose  DVT prophylaxis: INR 2 with GI bleed.  Has SCDs  Hx CAD on B-Blocker: none due to GI bleed  Head of Bed/Reposition: Elevate HOB and turn Q1-2 hours   Early Mobility: bed rest  Alicea Day: #2  IVAB Day: #1  Code Status: Full  Pneumonia Vaccine: ordered    Counseling/Consultation:I have discussed the care  of this patient in detail with the bedside nursing staff and Dr. Inderjit SARMIENTO    Critical Care Time: 58 minutes  Critical secondary to Patient has a condition that poses threat to life and bodily function: Acute Renal Failure  Patient has an abrupt change in neurologic status: Met Encephalopathy     Critical care was time spent personally by me on the following activities: development of treatment plan with patient or surrogate and bedside caregivers, discussions with consultants, evaluation of patient's response to treatment, examination of patient, ordering and performing treatments and interventions, ordering and review of laboratory studies, ordering and review of radiographic studies, pulse oximetry, re-evaluation of patient's condition. This critical care time did not overlap with that of any other provider or involve time for any procedures.    Thank you for your consult. I will follow-up with patient. Please contact us if you have any additional questions.     Chepe Bahena NP  Critical Care Medicine  [facility]

## 2020-07-09 NOTE — ASSESSMENT & PLAN NOTE
Patient with history of cirrhosis of the liver with esophageal varices per review of chart in Care everywhere.  Extensive history of alcohol abuse, reports of melena in ER.  Patient given 2 units of FFP and 10 mg of vitamin K overnight for elevated INR, given 3 units of PRBC.  GI following and plans for possible EGD.    --daily CBC CMP  --repeat H&H following 3rd unit of PRBC  --transfuse with PRBC for hemoglobin less than 7

## 2020-07-09 NOTE — SIGNIFICANT EVENT
Vitamin K given this am. PT/PTT elevated. Will give 2 units FFP and 1 unit of platelet. [id number] for TTP. Heme/onc consulted on case. Case discussed with GI who plan for EGD once H/H more stable. Continue to transfuse pRBC until HGB > 8. Anion gap metabolic acidosis noted likely 2/2 to lactic acidosis from severe anemia. Acidosis should improve with blood transfusion. ABD u/s ordered for elevated LFT and hyperbilirubinemia. Trace ascites noted and no common bile duct dilatation. BP stable. No need for pressor support at this time. Lactulose given for hyperammonia.

## 2020-07-09 NOTE — ASSESSMENT & PLAN NOTE
Gastroenterology is planning EGD, 3 units PRBC given since admission for admitting hemoglobin of 3.8.  Will need repeat CBC.  Monitor stools for melena.  Continue octreotide.    --daily CBC  --transfuse PRBC for hemoglobin less than 7

## 2020-07-09 NOTE — HPI
Ms. Wray is a 44-year-old  female with known history of liver cirrhosis from chronic alcohol abuse and hepatitis, presented to the ED complaining of worsening abdominal distention and shortness of breath the past few days.  Reports last alcoholic drink few weeks ago.  Patient is a very poor historian.  She also describes black tarry stools for the past few weeks, but has not sought medical attention until today.  Hemoglobin was undetectable upon initial arrival.  She received 1.5 unit of PRBC transfusion so far, repeat CBC reveals hemoglobin 3.8, hematocrit 12.9, WBC 98409, with platelets 68900.  INR 2.0, not on chronic anticoagulation.  Creatinine elevated at 1.6, CO2 8.  Total bilirubin 14.9, , ALT 44.  Ammonia 65.  BNP 28 29.  Troponin 0.092.  Stool Hemoccult test is positive.  Patient is hemodynamically stable, blood pressure 141/65.  Patient reports history of paracentesis long time ago.    Admitting diagnosis GI bleed with melena, severe symptomatic anemia, hemoglobin initially undetectable, now 3.8 after 1.5 unit PRBC transfusion.  Decompensated liver cirrhosis.

## 2020-07-09 NOTE — HOSPITAL COURSE
7/9 - This AM patient fully awake, alert and responsive but very confused and restless at times requiring frequent redirection.  She is receiving PRBCs.  She had melena at home and BRBPR in ED but no rectal bleeding since admit overnight.  VSS and she is in no distress talking on cell phone with daughter.   7/10 - Restless and confused last 24 hours now sedated and sleeping post Ativan IVP overnight.  No distress.  No active bleeding still since admit.    7/11 - EGD done yesterday noted large esophageal varices with stigma of recent bleeding and some bleeding suspected on oralpharynx.  Left intubated on mech vent overnight and tolerated SAT/SBT this AM.  VSS and no distress.    7/12 - Much more alert and responsive this AM with orientation and no distress.

## 2020-07-09 NOTE — CONSULTS
[facility] -   Gastroenterology  Consult Note    Patient Name: John Wray  MRN: [id number]  Admission Date: 7/8/2020  Hospital Length of Stay: 0 days  Code Status: No Order   Attending Provider: Holger Flower MD   Consulting Provider: Duy Rosales PA-C  Primary Care Physician: No primary care provider on file.  Principal Problem:Gastrointestinal hemorrhage with melena    Inpatient consult to Gastroenterology  Consult performed by: Duy Rosales PA-C  Consult ordered by: Mac Saeed MD  Reason for consult: Liver cirrhosis; severe anemia        Subjective:     HPI:  The patient presented to the ER with weakness and SOB. The patient is currently confused and unable to provide any history. Her history has been obtained from the medical record and medical staff. Review of Care Everywhere shows a visit to [facility] on 08/27/19 for alcoholic cirrhosis with h/o esophageal varices, ascites and mild encephalopathy. She had positive HAV IgG and negative HCV RNA. HBV status unknown. 04/2019 US showed small ascites and CT scan showed diffuse fatty infiltration of liver with hepatosplenomegaly and enlarged gastric varices consistent with portal hypertension, and cholelithiasis. Baseline Hgb around 9. LFTs were similar to this admit with T. Bili over 10 and INR below 2.   This admit, work up revealed at Hgb of 3.8 after two units of blood. She is getting a third unit now. She also got a unit of FFP overnight. The nurse said the ER reported dark red stools overnight. No BM since getting to the unit. Her INR is 2.0. LFT elevated. CBC elevated. Afebrile. No significant tachycardia and BP has been primarily normal to elevated. UA unremarkable. CXR showed possible process on the right. Nurse reports no complaints of pain from patient and no vomiting. There are reports she is still drinking alcohol.     Past Medical History:   Diagnosis Date    Alcohol abuse     Cirrhosis     Depression     Hypertension      Portal hypertension with esophageal varices        Past Surgical History:   Procedure Laterality Date     SECTION      ESOPHAGOGASTRODUODENOSCOPY         Review of patient's allergies indicates:  No Known Allergies  Family History     None        Tobacco Use    Smoking status: Current Every Day Smoker     Packs/day: 0.50   Substance and Sexual Activity    Alcohol use: Not Currently    Drug use: Never    Sexual activity: Not on file     Review of Systems   Unable to perform ROS: Mental status change     Objective:     Vital Signs (Most Recent):  Temp: 98.3 °F (36.8 °C) (20)  Pulse: 99 (20)  Resp: (!) 25 (20)  BP: (!) 146/40 (20)  SpO2: 99 % (20) Vital Signs (24h Range):  Temp:  [97.9 °F (36.6 °C)-98.6 °F (37 °C)] 98.3 °F (36.8 °C)  Pulse:  [] 99  Resp:  [18-37] 25  SpO2:  [96 %-100 %] 99 %  BP: (122-167)/() 146/40     Weight: 84 kg (185 lb 1.6 oz) (20)  Body mass index is 28.99 kg/m².      Intake/Output Summary (Last 24 hours) at 2020 0849  Last data filed at 2020 0715  Gross per 24 hour   Intake 1099.37 ml   Output 155 ml   Net 944.37 ml       Lines/Drains/Airways     Drain                 Urethral Catheter 20 0130 Latex 16 Fr. less than 1 day          Peripheral Intravenous Line                 Peripheral IV - Single Lumen 20 1849 20 G Right Antecubital less than 1 day         Peripheral IV - Single Lumen 20 0055 18 G Left Antecubital less than 1 day                Physical Exam  Constitutional:       Appearance: Normal appearance. She is well-developed.   HENT:      Head: Normocephalic and atraumatic.   Neck:      Musculoskeletal: Normal range of motion and neck supple.      Vascular: No carotid bruit.   Cardiovascular:      Rate and Rhythm: Normal rate and regular rhythm.      Heart sounds: No murmur.   Pulmonary:      Effort: Pulmonary effort is normal. No respiratory distress.      Breath  sounds: Normal breath sounds. No wheezing.   Abdominal:      General: Bowel sounds are normal. There is distension (but soft).      Palpations: There is no mass.      Tenderness: There is no abdominal tenderness.   Musculoskeletal:         General: No swelling.   Skin:     General: Skin is warm and dry.      Findings: No rash.   Neurological:      General: No focal deficit present.      Mental Status: She is alert.      Comments: She is awake but confused and talking to people not in the room. No asterixis.          Significant Labs:  CBC:   Recent Labs   Lab 07/09/20  0408   WBC 14.63*   HGB 3.8*   HCT 12.9*   PLT 49*     CMP:   Recent Labs   Lab 07/09/20  0408   GLU 93   CALCIUM 9.0   ALBUMIN 2.4*   PROT 9.2*      K 4.5   CO2 10*      BUN 25*   CREATININE 2.1*   ALKPHOS 135   ALT 42   *   BILITOT 14.9*     Coagulation:   Recent Labs   Lab 07/08/20  1852   INR 2.0*   APTT 34.7*       Significant Imaging:  Imaging results within the past 24 hours have been reviewed.    Assessment/Plan:     * Gastrointestinal hemorrhage with melena  Due to tolerance of severely low Hgb and relatively stable vitals, acute or active GI bleed unlikely. However, we can't rule out chronic bleeding. Will plan on EGD once patient is more stable (Hgb above 7, INR around 1.5) We will also need to see who can consent for her if she remains confused.   Agree with Protonix IV 40 mg bid.   Presentation doesn't support variceal bleed but she has a history of variceal bleed so will bolus Octreotide and follow with infusion.   She will also need Rocephin 2 g daily.    Continue to monitor H/H closely. Agree with additional units of blood.     Decompensated HCV cirrhosis  Based on review of labs from Care Everywhere, the patient has chronically decompensated liver cirrhosis due to alcohol abuse with h/o varices and ascites.   Acute presentation could be secondary GI bleed; however, WBC count also elevated so need to rule out  infection. Will order blood cultures and dx paracentesis. In the differential would also include acute alcoholic hepatitis (DF 40.2).   Agree with US of RUQ.   Patient confused. Not sure if HE, metabolic or related to other process. Will start with Lactulose enema x 1, then can change to oral if able to tolerate oral intake.   Monitor INR and CMP daily.     MELD-Na score: 32 at 7/9/2020  4:08 AM  MELD score: 32 at 7/9/2020  4:08 AM  Calculated from:  Serum Creatinine: 2.1 mg/dL at 7/9/2020  4:08 AM  Serum Sodium: 136 mmol/L at 7/9/2020  4:08 AM  Total Bilirubin: 14.9 mg/dL at 7/9/2020  4:08 AM  INR(ratio): 2.0 at 7/8/2020  6:52 PM  Age: 44 years 6 months        Severe anemia  Patient has chronic macrocytic anemia. Suspect current levels in part related to GI bleed.   Monitor and transfuse.   See above.     SUNG (acute kidney injury)  Agree with getting US and Nephrology consult. I will order urine sodium.    Encephalopathy  Likely multifactorial. Will start start her on lactulose. Monitor neuro status closely.   See above.     Coagulopathy  Likely related to liver cirrhosis. Continue to monitor.         Thank you for your consult. I will follow-up with patient. Please contact us if you have any additional questions.    Duy Rosales PA-C  Gastroenterology  [facility]

## 2020-07-09 NOTE — ED NOTES
Sat pt up on side of bed for walk test, pt O2sat dropped to 75% on room air, pt became tachypneic with minimal exertion. Assisted pt back into a upright sitting position. MD notified and at bedside, gave verbal orders for room air ABG and bed rest. Pt's resting O2sat returned to 100% on room air. Respiratory called for ABG. Will continue to monitor.

## 2020-07-09 NOTE — ASSESSMENT & PLAN NOTE
GI following:   Based on review of labs from Care Everywhere, the patient has chronically decompensated liver cirrhosis due to alcohol abuse with h/o varices and ascites.   Acute presentation could be secondary GI bleed; however, WBC count also elevated so need to rule out infection. Will order blood cultures and dx paracentesis. In the differential would also include acute alcoholic hepatitis (DF 40.2).   Agree with US of RUQ.   Patient confused. Not sure if HE, metabolic or related to other process. Will start with Lactulose enema x 1, then can change to oral if able to tolerate oral intake.   Monitor INR and CMP daily.   MELD-Na score: 32 at 7/9/2020  4:08 AM  MELD score: 32 at 7/9/2020  4:08 AM  Calculated from:  Serum Creatinine: 2.1 mg/dL at 7/9/2020  4:08 AM  Serum Sodium: 136 mmol/L at 7/9/2020  4:08 AM  Total Bilirubin: 14.9 mg/dL at 7/9/2020  4:08 AM  INR(ratio): 2.0 at 7/8/2020  6:52 PM  Age: 44 years 6 months

## 2020-07-09 NOTE — SUBJECTIVE & OBJECTIVE
Past Medical History:   Diagnosis Date    Alcohol abuse     Cirrhosis     Depression     Hypertension     Portal hypertension with esophageal varices        Past Surgical History:   Procedure Laterality Date     SECTION      ESOPHAGOGASTRODUODENOSCOPY         Review of patient's allergies indicates:  No Known Allergies  Current Facility-Administered Medications   Medication Frequency    0.9%  NaCl infusion (for blood administration) Q24H PRN    0.9%  NaCl infusion (for blood administration) Q24H PRN    lactulose 10 gram/15 mL solution (enema) 200 g TID    lactulose 20 gram/30 mL solution Soln 20 g BID    magnesium sulfate 2g in water 50mL IVPB (premix) Once    morphine injection 2 mg Q4H PRN    ondansetron injection 4 mg Q8H PRN    pantoprazole injection 40 mg BID     Family History     None        Tobacco Use    Smoking status: Current Every Day Smoker     Packs/day: 0.50   Substance and Sexual Activity    Alcohol use: Not Currently    Drug use: Never    Sexual activity: Not on file     Review of Systems   Constitutional: Negative for fatigue and fever.   HENT: Negative for congestion.    Eyes: Negative for visual disturbance.   Respiratory: Positive for shortness of breath. Negative for cough and wheezing.    Cardiovascular: Negative for chest pain and palpitations.   Gastrointestinal: Positive for diarrhea. Negative for abdominal pain, nausea and vomiting.   Genitourinary: Negative for difficulty urinating and dysuria.   Musculoskeletal: Negative for joint swelling.   Skin: Negative for rash.   Neurological: Positive for weakness. Negative for headaches.     Objective:     Vital Signs (Most Recent):  Temp: 98.3 °F (36.8 °C) (20)  Pulse: 99 (20)  Resp: (!) 25 (20)  BP: (!) 146/40 (20)  SpO2: 99 % (20)  O2 Device (Oxygen Therapy): room air (20) Vital Signs (24h Range):  Temp:  [97.9 °F (36.6 °C)-98.6 °F (37 °C)] 98.3 °F  (36.8 °C)  Pulse:  [] 99  Resp:  [18-37] 25  SpO2:  [96 %-100 %] 99 %  BP: (122-167)/() 146/40     Weight: 83.9 kg (185 lb) (20 0721)  Body mass index is 28.98 kg/m².  Body surface area is 1.99 meters squared.    I/O last 3 completed shifts:  In: 749.4 [Blood:749.4]  Out: 155 [Urine:155]    Physical Exam  Constitutional:       Appearance: She is well-developed.   HENT:      Head: Normocephalic.   Eyes:      Pupils: Pupils are equal, round, and reactive to light.   Neck:      Thyroid: No thyromegaly.   Cardiovascular:      Rate and Rhythm: Normal rate and regular rhythm.      Heart sounds: No friction rub.   Pulmonary:      Effort: Pulmonary effort is normal.      Breath sounds: Normal breath sounds. No wheezing.   Chest:      Chest wall: No tenderness.   Abdominal:      General: Bowel sounds are normal. There is distension.      Palpations: Abdomen is soft.      Tenderness: There is no abdominal tenderness.   Lymphadenopathy:      Cervical: No cervical adenopathy.   Skin:     General: Skin is warm and dry.      Findings: No rash.   Neurological:      Mental Status: She is alert.      Comments: Patient responds to questions appropriately.          Significant Labs:  Lab Results   Component Value Date    CREATININE 2.1 (H) 2020    BUN 25 (H) 2020     2020    K 4.5 2020     2020    CO2 10 (L) 2020     Lab Results   Component Value Date    CALCIUM 9.0 2020    PHOS 4.7 (H) 2020     Lab Results   Component Value Date    ALBUMIN 2.4 (L) 2020     Lab Results   Component Value Date    WBC 14.63 (H) 2020    HGB 3.8 (LL) 2020    HCT 12.9 (LL) 2020     (H) 2020    PLT 49 (L) 2020     A    Ammonia 65.     Recent Labs   Lab 20  0408   MG 1.1*     Urinalysis  Recent Labs   Lab 20  1932   COLORU Yellow   SPECGRAV 1.020   PHUR 6.0   PROTEINUA Negative   NITRITE Negative   LEUKOCYTESUR Negative    UROBILINOGEN >=8.0*     Recent Labs   Lab 07/08/20  1852   TROPONINI 0.092*       Significant Imaging:  Imaging Results          X-Ray Chest AP Portable (Final result)  Result time 07/08/20 20:15:47    Final result by Raulito Jacques MD (07/08/20 20:15:47)                 Impression:      1.  Reticular and ground-glass interstitial changes present.  Tiny right effusion.  Cardiac silhouette size enlargement.  Interstitial infectious process such as atypical pneumonia difficult to exclude.  Other considerations would include interstitial pulmonary edema.    2.  Incidental findings as noted above.      Electronically signed by: Raulito Jacques MD  Date:    07/08/2020  Time:    20:15             Narrative:    EXAMINATION:  XR CHEST AP PORTABLE    CLINICAL HISTORY:  SOB;    COMPARISON:  No comparison studies are available.    FINDINGS:  EKG leads overlie the chest.  Oxygen tubing overlies the chest as well, which is rotated to the left.  Reticular and ground-glass interstitial changes throughout the lungs.  Possible tiny right effusion.  The lungs are otherwise clear.  The cardiac silhouette size is enlarged.  The trachea is midline and the mediastinal width is normal. Negative for left effusion or pneumothorax.  Pulmonary vasculature is congested.  Negative for osseous abnormalities. Tortuous aorta with calcifications of the aortic knob.  Degenerative changes of the spine with convex right curvature.

## 2020-07-09 NOTE — H&P
[facility] Hospital Medicine  History & Physical    Patient Name: John Wray  MRN: [id number]  Admission Date: 2020  Attending Physician: Mac Saeed MD  Primary Care Provider: No primary care provider on file.         Patient information was obtained from patient, past medical records and ER records.     Subjective:     Principal Problem:Gastrointestinal hemorrhage with melena    Chief Complaint:   Chief Complaint   Patient presents with    Shortness of Breath     Weakness, history of ascites per EMS        HPI: Ms. Wray is a 44-year-old  female with known history of liver cirrhosis from chronic alcohol abuse and hepatitis, presented to the ED complaining of worsening abdominal distention and shortness of breath the past few days.  Reports last alcoholic drink few weeks ago.  Patient is a very poor historian.  She also describes black tarry stools for the past few weeks, but has not sought medical attention until today.  Hemoglobin was undetectable upon initial arrival.  She received 1.5 unit of PRBC transfusion so far, repeat CBC reveals hemoglobin 3.8, hematocrit 12.9, WBC 56067, with platelets 62845.  INR 2.0, not on chronic anticoagulation.  Creatinine elevated at 1.6, CO2 8.  Total bilirubin 14.9, , ALT 44.  Ammonia 65.  BNP 28 29.  Troponin 0.092.  Stool Hemoccult test is positive.  Patient is hemodynamically stable, blood pressure 141/65.  Patient reports history of paracentesis long time ago.    Admitting diagnosis GI bleed with melena, severe symptomatic anemia, hemoglobin initially undetectable, now 3.8 after 1.5 unit PRBC transfusion.  Decompensated liver cirrhosis.    Past Medical History:   Diagnosis Date    Alcohol abuse     Cirrhosis     Depression     Hepatitis C     Hypertension        Past Surgical History:   Procedure Laterality Date     SECTION      ESOPHAGOGASTRODUODENOSCOPY         Review of patient's allergies indicates:  No  Known Allergies    No current facility-administered medications on file prior to encounter.      Current Outpatient Medications on File Prior to Encounter   Medication Sig    folic acid (FOLVITE) 1 MG tablet Take 1 mg by mouth once daily.    furosemide (LASIX) 40 MG tablet Take 40 mg by mouth 2 (two) times daily.    pantoprazole (PROTONIX) 40 MG tablet Take 40 mg by mouth once daily.    sertraline (ZOLOFT) 25 MG tablet Take 25 mg by mouth once daily.    spironolactone (ALDACTONE) 50 MG tablet Take 50 mg by mouth once daily.     Family History     Reviewed and Not Pertinent        Tobacco Use    Smoking status: Current Every Day Smoker     Packs/day: 0.50   Substance and Sexual Activity    Alcohol use: Not Currently    Drug use: Never    Sexual activity: Not on file     Review of Systems   Constitutional: Positive for appetite change and fatigue. Negative for chills, diaphoresis and fever.   HENT: Negative.  Negative for congestion, nosebleeds and sinus pressure.    Eyes: Negative.  Negative for visual disturbance.   Respiratory: Positive for shortness of breath. Negative for cough, chest tightness and wheezing.    Cardiovascular: Positive for leg swelling. Negative for chest pain and palpitations.   Gastrointestinal: Positive for abdominal distention, blood in stool (black) and nausea. Negative for abdominal pain, diarrhea and vomiting.   Endocrine: Negative.  Negative for polyuria.   Genitourinary: Negative.  Negative for dysuria, flank pain, frequency, hematuria and urgency.   Musculoskeletal: Negative.  Negative for back pain, joint swelling and neck stiffness.   Skin: Positive for pallor. Negative for color change and rash.   Allergic/Immunologic: Negative.  Negative for immunocompromised state.   Neurological: Positive for dizziness and weakness (Generalized). Negative for syncope, speech difficulty, numbness and headaches.   Hematological: Negative.  Negative for adenopathy.   Psychiatric/Behavioral:  Positive for confusion and decreased concentration. Negative for hallucinations. The patient is not nervous/anxious.    All other systems reviewed and are negative.    Objective:     Vital Signs (Most Recent):  Temp: 98.6 °F (37 °C) (07/09/20 0500)  Pulse: 87 (07/09/20 0532)  Resp: (!) 26 (07/09/20 0532)  BP: (!) 135/37 (07/09/20 0500)  SpO2: 100 % (07/09/20 0532) Vital Signs (24h Range):  Temp:  [97.9 °F (36.6 °C)-98.6 °F (37 °C)] 98.6 °F (37 °C)  Pulse:  [] 87  Resp:  [18-32] 26  SpO2:  [96 %-100 %] 100 %  BP: (122-167)/(37-84) 135/37     Weight: 84 kg (185 lb 1.6 oz)  Body mass index is 28.99 kg/m².    Physical Exam  Vitals signs and nursing note reviewed.   Constitutional:       General: She is not in acute distress.     Appearance: She is well-developed. She is ill-appearing. She is not diaphoretic.   HENT:      Head: Normocephalic and atraumatic.      Mouth/Throat:      Mouth: Mucous membranes are dry.   Eyes:      General: Scleral icterus present.      Conjunctiva/sclera: Conjunctivae normal.   Neck:      Musculoskeletal: Normal range of motion and neck supple.      Thyroid: No thyromegaly.      Vascular: No JVD.      Trachea: No tracheal deviation.   Cardiovascular:      Rate and Rhythm: Regular rhythm. Tachycardia present.      Heart sounds: Normal heart sounds. No murmur.   Pulmonary:      Effort: Pulmonary effort is normal. No respiratory distress.      Breath sounds: Normal breath sounds.   Abdominal:      General: There is distension (Ascites present).      Palpations: Abdomen is soft.      Tenderness: There is no abdominal tenderness.   Musculoskeletal: Normal range of motion.         General: Swelling present. No tenderness.   Lymphadenopathy:      Cervical: No cervical adenopathy.   Skin:     General: Skin is warm and dry.      Findings: No erythema.   Neurological:      General: No focal deficit present.      Mental Status: She is alert.      Cranial Nerves: No cranial nerve deficit.       Motor: No abnormal muscle tone.      Coordination: Coordination abnormal.      Comments: Patient is intermittently confused, disoriented   Psychiatric:         Behavior: Behavior normal.             Significant Labs:   Bilirubin:   Recent Labs   Lab 07/08/20  1852 07/09/20  0408   BILITOT 14.9* 14.9*     BMP:   Recent Labs   Lab 07/09/20  0408   GLU 93      K 4.5      CO2 10*   BUN 25*   CREATININE 2.1*   CALCIUM 9.0   MG 1.1*     CBC:   Recent Labs   Lab 07/09/20  0408   WBC 14.63*   HGB 3.8*   HCT 12.9*   PLT 49*     CMP:   Recent Labs   Lab 07/08/20  1852 07/09/20  0408   * 136   K 4.0 4.5    103   CO2 8* 10*   GLU 84 93   BUN 22* 25*   CREATININE 1.6* 2.1*   CALCIUM 9.2 9.0   PROT 9.2* 9.2*   ALBUMIN 2.2* 2.4*   BILITOT 14.9* 14.9*   ALKPHOS 154* 135   * 226*   ALT 44 42   ANIONGAP 23* 23*   EGFRNONAA 39* 28*     Cardiac Markers:   Recent Labs   Lab 07/08/20  1852   BNP 2,829*     Coagulation:   Recent Labs   Lab 07/08/20  1852   INR 2.0*   APTT 34.7*     Lactic Acid: No results for input(s): LACTATE in the last 48 hours.  Troponin:   Recent Labs   Lab 07/08/20  1852   TROPONINI 0.092*     Urine Studies:   Recent Labs   Lab 07/08/20  1932   COLORU Yellow   APPEARANCEUA Clear   PHUR 6.0   SPECGRAV 1.020   PROTEINUA Negative   GLUCUA Negative   KETONESU Trace*   BILIRUBINUA 3+*   OCCULTUA Negative   NITRITE Negative   UROBILINOGEN >=8.0*   LEUKOCYTESUR Negative     All pertinent labs within the past 24 hours have been reviewed.    Significant Imaging: I have reviewed and interpreted all pertinent imaging results/findings within the past 24 hours.     Imaging Results          X-Ray Chest AP Portable (Final result)  Result time 07/08/20 20:15:47    Final result by Raulito Jacques MD (07/08/20 20:15:47)                 Impression:      1.  Reticular and ground-glass interstitial changes present.  Tiny right effusion.  Cardiac silhouette size enlargement.  Interstitial infectious process  such as atypical pneumonia difficult to exclude.  Other considerations would include interstitial pulmonary edema.    2.  Incidental findings as noted above.      Electronically signed by: Raulito Jacques MD  Date:    07/08/2020  Time:    20:15             Narrative:    EXAMINATION:  XR CHEST AP PORTABLE    CLINICAL HISTORY:  SOB;    COMPARISON:  No comparison studies are available.    FINDINGS:  EKG leads overlie the chest.  Oxygen tubing overlies the chest as well, which is rotated to the left.  Reticular and ground-glass interstitial changes throughout the lungs.  Possible tiny right effusion.  The lungs are otherwise clear.  The cardiac silhouette size is enlarged.  The trachea is midline and the mediastinal width is normal. Negative for left effusion or pneumothorax.  Pulmonary vasculature is congested.  Negative for osseous abnormalities. Tortuous aorta with calcifications of the aortic knob.  Degenerative changes of the spine with convex right curvature.                                I have independently reviewed and interpreted the EKG.     I have independently reviewed all pertinent labs within the past 24 hours.    I have independently reviewed, visualized and interpreted all pertinent imaging results within the past 24 hours and discussed the findings with the ED physician, Dr. Oviedo            Assessment/Plan:     * Gastrointestinal hemorrhage with melena  GI bleed with melena.  Hemoglobin 3.8 after 1.5 unit PRBC transfusion.  Keep NPO.  GI consulted.  For possible EGD today.      Severe anemia  Severe symptomatic anemia.  Hemoglobin was undetectable initially one in the ED presentation, currently 3.8 after 1.5 units of PRBC transfusion.  Transfused total of 3 units PRBC.  Repeat H&H and may need further transfusion after that.  Closely monitor in the ICU.  Patient denies hematemesis.      Decompensated HCV cirrhosis  Longstanding history of alcoholic liver disease and hepatitis.  Patient continues to  drink alcohol, last drink few weeks ago (per patient).  GI consult.  Patient received Lasix 60 mg IV x1 in the ED.  Reports noncompliance with all medications, took medications many months ago.      SUNG (acute kidney injury)  Serum creatinine elevated 1.6, likely secondary to gastrointestinal bleeding.  Transfuse 3 units PRBC total.  Repeat labs in a.m..  Consider nephrology consult.      Acute hepatic encephalopathy  Ammonia 65.  Patient is intermittently confused.  Will need lactulose after EGD.      Metabolic acidosis, increased anion gap  CO2 8, anion gap 23.  Check lactic acid.  Unable to aggressively hydrate her due to significant ascites and elevated BNP and shortness of breath.      Coagulopathy  INR 2.0, not on oral anticoagulation.  Coagulopathy secondary to decompensated liver cirrhosis.  Vitamin K 10 mg IV x1.        VTE Risk Mitigation (From admission, onward)         Ordered     Place sequential compression device  Until discontinued      07/09/20 0000              Critical care time spent on the evaluation and treatment of severe organ dysfunction, review of pertinent labs and imaging studies, discussions with consulting providers and discussions with patient/family: 45 minutes.     Mac Saeed MD  Department of Hospital Medicine   [facility]

## 2020-07-09 NOTE — H&P (VIEW-ONLY)
[facility]   Gastroenterology  Consult Note    Patient Name: John Wray  MRN: [id number]  Admission Date: 7/8/2020  Hospital Length of Stay: 0 days  Code Status: No Order   Attending Provider: Holger Flower MD   Consulting Provider: Duy Rosales PA-C  Primary Care Physician: No primary care provider on file.  Principal Problem:Gastrointestinal hemorrhage with melena    Inpatient consult to Gastroenterology  Consult performed by: Duy Rosales PA-C  Consult ordered by: Mac Saeed MD  Reason for consult: Liver cirrhosis; severe anemia        Subjective:     HPI:  The patient presented to the ER with weakness and SOB. The patient is currently confused and unable to provide any history. Her history has been obtained from the medical record and medical staff. Review of Care Everywhere shows a visit to [facility] on 08/27/19 for alcoholic cirrhosis with h/o esophageal varices, ascites and mild encephalopathy. She had positive HAV IgG and negative HCV RNA. HBV status unknown. 04/2019 US showed small ascites and CT scan showed diffuse fatty infiltration of liver with hepatosplenomegaly and enlarged gastric varices consistent with portal hypertension, and cholelithiasis. Baseline Hgb around 9. LFTs were similar to this admit with T. Bili over 10 and INR below 2.   This admit, work up revealed at Hgb of 3.8 after two units of blood. She is getting a third unit now. She also got a unit of FFP overnight. The nurse said the ER reported dark red stools overnight. No BM since getting to the unit. Her INR is 2.0. LFT elevated. CBC elevated. Afebrile. No significant tachycardia and BP has been primarily normal to elevated. UA unremarkable. CXR showed possible process on the right. Nurse reports no complaints of pain from patient and no vomiting. There are reports she is still drinking alcohol.     Past Medical History:   Diagnosis Date    Alcohol abuse     Cirrhosis     Depression     Hypertension      Portal hypertension with esophageal varices        Past Surgical History:   Procedure Laterality Date     SECTION      ESOPHAGOGASTRODUODENOSCOPY         Review of patient's allergies indicates:  No Known Allergies  Family History     None        Tobacco Use    Smoking status: Current Every Day Smoker     Packs/day: 0.50   Substance and Sexual Activity    Alcohol use: Not Currently    Drug use: Never    Sexual activity: Not on file     Review of Systems   Unable to perform ROS: Mental status change     Objective:     Vital Signs (Most Recent):  Temp: 98.3 °F (36.8 °C) (20)  Pulse: 99 (20)  Resp: (!) 25 (20)  BP: (!) 146/40 (20)  SpO2: 99 % (20) Vital Signs (24h Range):  Temp:  [97.9 °F (36.6 °C)-98.6 °F (37 °C)] 98.3 °F (36.8 °C)  Pulse:  [] 99  Resp:  [18-37] 25  SpO2:  [96 %-100 %] 99 %  BP: (122-167)/() 146/40     Weight: 84 kg (185 lb 1.6 oz) (20)  Body mass index is 28.99 kg/m².      Intake/Output Summary (Last 24 hours) at 2020 0849  Last data filed at 2020 0715  Gross per 24 hour   Intake 1099.37 ml   Output 155 ml   Net 944.37 ml       Lines/Drains/Airways     Drain                 Urethral Catheter 20 0130 Latex 16 Fr. less than 1 day          Peripheral Intravenous Line                 Peripheral IV - Single Lumen 20 1849 20 G Right Antecubital less than 1 day         Peripheral IV - Single Lumen 20 0055 18 G Left Antecubital less than 1 day                Physical Exam  Constitutional:       Appearance: Normal appearance. She is well-developed.   HENT:      Head: Normocephalic and atraumatic.   Neck:      Musculoskeletal: Normal range of motion and neck supple.      Vascular: No carotid bruit.   Cardiovascular:      Rate and Rhythm: Normal rate and regular rhythm.      Heart sounds: No murmur.   Pulmonary:      Effort: Pulmonary effort is normal. No respiratory distress.      Breath  sounds: Normal breath sounds. No wheezing.   Abdominal:      General: Bowel sounds are normal. There is distension (but soft).      Palpations: There is no mass.      Tenderness: There is no abdominal tenderness.   Musculoskeletal:         General: No swelling.   Skin:     General: Skin is warm and dry.      Findings: No rash.   Neurological:      General: No focal deficit present.      Mental Status: She is alert.      Comments: She is awake but confused and talking to people not in the room. No asterixis.          Significant Labs:  CBC:   Recent Labs   Lab 07/09/20  0408   WBC 14.63*   HGB 3.8*   HCT 12.9*   PLT 49*     CMP:   Recent Labs   Lab 07/09/20  0408   GLU 93   CALCIUM 9.0   ALBUMIN 2.4*   PROT 9.2*      K 4.5   CO2 10*      BUN 25*   CREATININE 2.1*   ALKPHOS 135   ALT 42   *   BILITOT 14.9*     Coagulation:   Recent Labs   Lab 07/08/20  1852   INR 2.0*   APTT 34.7*       Significant Imaging:  Imaging results within the past 24 hours have been reviewed.    Assessment/Plan:     * Gastrointestinal hemorrhage with melena  Due to tolerance of severely low Hgb and relatively stable vitals, acute or active GI bleed unlikely. However, we can't rule out chronic bleeding. Will plan on EGD once patient is more stable (Hgb above 7, INR around 1.5) We will also need to see who can consent for her if she remains confused.   Agree with Protonix IV 40 mg bid.   Presentation doesn't support variceal bleed but she has a history of variceal bleed so will bolus Octreotide and follow with infusion.   She will also need Rocephin 2 g daily.    Continue to monitor H/H closely. Agree with additional units of blood.     Decompensated HCV cirrhosis  Based on review of labs from Care Everywhere, the patient has chronically decompensated liver cirrhosis due to alcohol abuse with h/o varices and ascites.   Acute presentation could be secondary GI bleed; however, WBC count also elevated so need to rule out  infection. Will order blood cultures and dx paracentesis. In the differential would also include acute alcoholic hepatitis (DF 40.2).   Agree with US of RUQ.   Patient confused. Not sure if HE, metabolic or related to other process. Will start with Lactulose enema x 1, then can change to oral if able to tolerate oral intake.   Monitor INR and CMP daily.     MELD-Na score: 32 at 7/9/2020  4:08 AM  MELD score: 32 at 7/9/2020  4:08 AM  Calculated from:  Serum Creatinine: 2.1 mg/dL at 7/9/2020  4:08 AM  Serum Sodium: 136 mmol/L at 7/9/2020  4:08 AM  Total Bilirubin: 14.9 mg/dL at 7/9/2020  4:08 AM  INR(ratio): 2.0 at 7/8/2020  6:52 PM  Age: 44 years 6 months        Severe anemia  Patient has chronic macrocytic anemia. Suspect current levels in part related to GI bleed.   Monitor and transfuse.   See above.     SUNG (acute kidney injury)  Agree with getting US and Nephrology consult. I will order urine sodium.    Encephalopathy  Likely multifactorial. Will start start her on lactulose. Monitor neuro status closely.   See above.     Coagulopathy  Likely related to liver cirrhosis. Continue to monitor.         Thank you for your consult. I will follow-up with patient. Please contact us if you have any additional questions.    Duy Rosales PA-C  Gastroenterology  [facility]

## 2020-07-09 NOTE — HPI
Ms Wray is a 45 yo obese BF with a PMH of daily etoh use, Cirrhosis, Ascites, MDD, Hep C, HTN and tobacco abuse.  She presented to the ED on 7/8 due to worsening abdominal distension and SOB. Pt also described dark tarry stools consistent with melena. Per ED, pt's hgb was undetectable on arrival so she received 1.5 units of packed RBCs, which brought her hbg up to 3.8 and hct to 12.9. The rest of pt's CBC showed WBC 52291 and plts 4900. INR 2, Cr 1.6, CO2 8, AG 23, Mg 1.1,Total bilirubin 14.9. AST/ALT ratio >2. Ammonia 65. BNP 2829. Troponin 0.092. Stool occult blood test positive. Pt was hemodynamically stable in the ED and was admitted to ICU for presumptive dx of GI bleed w/ melena, severe symptomatic anemia, and decompensated liver cirrhosis with GI consult requested.

## 2020-07-09 NOTE — PLAN OF CARE
"CM spoke with daughter Kalee Cox [phone] and sister Jacklyn Betancourt [phone] to complete initial assessment. Pt previously lived with daughter Kalee who assisted with needs and provided transportation. Pt uses walker and BSC at home. May need additional equipment dependent upon functional status at time of dc. Per daughter pt was bale administer all meds. Daughter also reports pt has been drinking an unknown amount of beer and whiskey due to hiding for past 7 years. Daughter Kalee and sister Jacklyn stated that pt needs "professional help" before coming home to address ETOH abuse. Pt is currently still  confused and unable to discuss at this time. Explained that if pt is willing we can provide resources for IP and OP substance abuse rehab IF pt is willing. Verbalized understanding. Contact information provided. Instructed to call CM with questions or concerns.      PCP: Dr. Blackman? Unable to find MD and family unsure how to spell  Bedside delivery: yes  My [facility]: pending  Pharmacy: [facility]  D/c: IP rehab VS OP rehab vs home       07/09/20 1145   Discharge Assessment   Assessment Type Discharge Planning Assessment   Confirmed/corrected address and phone number on facesheet? Yes   Assessment information obtained from? Caregiver;Medical Record   Expected Length of Stay (days)   (TBD)   Communicated expected length of stay with patient/caregiver yes   Prior to hospitilization cognitive status: Alert/Oriented   Prior to hospitalization functional status: Assistive Equipment   Current cognitive status:   (disoriented to situation with confusion per chart review)   Current Functional Status: Needs Assistance   Facility Arrived From: home   Lives With child(brenda), adult   Able to Return to Prior Arrangements yes  (pt may return tp daughter's home, but she and sister asked that detox be offered to pt)   Is patient able to care for self after discharge? Unable to determine at this time (comments) "   Who are your caregiver(s) and their phone number(s)? Kalee Cox, daughter, ([phone])   Patient's perception of discharge disposition other (comments)  (FLORENCIO)   Readmission Within the Last 30 Days no previous admission in last 30 days   Patient currently being followed by outpatient case management? No   Patient currently receives any other outside agency services? No   Equipment Currently Used at Home bedside commode;walker, standard   Do you have any problems affording any of your prescribed medications? No   Is the patient taking medications as prescribed? yes   Does the patient have transportation home? Yes   Transportation Anticipated family or friend will provide   Dialysis Name and Scheduled days NA   Does the patient receive services at the [facility]? No   Discharge Plan A Other  (IP detox)   Discharge Plan B Home with family;Other   DME Needed Upon Discharge  none   Patient/Family in Agreement with Plan yes

## 2020-07-09 NOTE — ED NOTES
Lab called for reorder and recollect CBC, lab states the specimen appears to be watered down with saline, states they are unable to run specimen.

## 2020-07-09 NOTE — ASSESSMENT & PLAN NOTE
Likely due to severe anemia (Hgb was initially undetectable and increased to 3.8 after 2 units of blood). Creatinine was 0.8 on 4/15/19. Creatinine has increased to 2.1 today. She made 150 cc of urine overnight. Avoid NSAIDs, ACE-I, ARB. No indication for dialysis at present. Will monitor closely.

## 2020-07-09 NOTE — ASSESSMENT & PLAN NOTE
Due to tolerance of severely low Hgb and relatively stable vitals, acute or active GI bleed unlikely. However, we can't rule out chronic bleeding. Will plan on EGD once patient is more stable (Hgb above 7, INR around 1.5) We will also need to see who can consent for her if she remains confused.   Agree with Protonix IV 40 mg bid.   Presentation doesn't support variceal bleed so will hold off on Octreotide at this time.   Continue to monitor H/H closely. Agree with additional units of blood.

## 2020-07-09 NOTE — ED NOTES
Pt reports feeling anxious prior to arrival. Pt states she woke up from her sleep, feeling short of breath. Pt denies chest pain. Pt appears to be in no acute distress, respirations even and unlabored. Will continue to monitor.

## 2020-07-10 ENCOUNTER — ANESTHESIA (OUTPATIENT)
Dept: ENDOSCOPY | Facility: HOSPITAL | Age: 45
DRG: 432 | End: 2020-07-10
Payer: MEDICAID

## 2020-07-10 ENCOUNTER — ANESTHESIA EVENT (OUTPATIENT)
Dept: ENDOSCOPY | Facility: HOSPITAL | Age: 45
DRG: 432 | End: 2020-07-10
Payer: MEDICAID

## 2020-07-10 PROBLEM — Z99.11 ON MECHANICALLY ASSISTED VENTILATION: Status: ACTIVE | Noted: 2020-07-10

## 2020-07-10 LAB
ALBUMIN SERPL BCP-MCNC: 2.8 G/DL (ref 3.5–5.2)
ALP SERPL-CCNC: 122 U/L (ref 55–135)
ALT SERPL W/O P-5'-P-CCNC: 44 U/L (ref 10–44)
AMMONIA PLAS-SCNC: 152 UMOL/L (ref 10–50)
ANION GAP SERPL CALC-SCNC: 12 MMOL/L (ref 8–16)
ANION GAP SERPL CALC-SCNC: 12 MMOL/L (ref 8–16)
ANION GAP SERPL CALC-SCNC: 14 MMOL/L (ref 8–16)
ANION GAP SERPL CALC-SCNC: 15 MMOL/L (ref 8–16)
ANION GAP SERPL CALC-SCNC: 15 MMOL/L (ref 8–16)
ANISOCYTOSIS BLD QL SMEAR: SLIGHT
ANISOCYTOSIS BLD QL SMEAR: SLIGHT
APTT BLDCRRT: 30 SEC (ref 21–32)
AST SERPL-CCNC: 195 U/L (ref 10–40)
BASOPHILS # BLD AUTO: 0.02 K/UL (ref 0–0.2)
BASOPHILS # BLD AUTO: 0.03 K/UL (ref 0–0.2)
BASOPHILS NFR BLD: 0.2 % (ref 0–1.9)
BASOPHILS NFR BLD: 0.3 % (ref 0–1.9)
BILIRUB SERPL-MCNC: 19.8 MG/DL (ref 0.1–1)
BLD PROD TYP BPU: NORMAL
BLOOD UNIT EXPIRATION DATE: NORMAL
BLOOD UNIT TYPE CODE: 7300
BLOOD UNIT TYPE CODE: 7300
BLOOD UNIT TYPE CODE: 9500
BLOOD UNIT TYPE CODE: NORMAL
BLOOD UNIT TYPE: NORMAL
BUN SERPL-MCNC: 42 MG/DL (ref 6–20)
BUN SERPL-MCNC: 43 MG/DL (ref 6–20)
BUN SERPL-MCNC: 43 MG/DL (ref 6–20)
BUN SERPL-MCNC: 47 MG/DL (ref 6–20)
BUN SERPL-MCNC: 48 MG/DL (ref 6–20)
BURR CELLS BLD QL SMEAR: ABNORMAL
CALCIUM SERPL-MCNC: 9.1 MG/DL (ref 8.7–10.5)
CALCIUM SERPL-MCNC: 9.2 MG/DL (ref 8.7–10.5)
CALCIUM SERPL-MCNC: 9.2 MG/DL (ref 8.7–10.5)
CALCIUM SERPL-MCNC: 9.4 MG/DL (ref 8.7–10.5)
CALCIUM SERPL-MCNC: 9.5 MG/DL (ref 8.7–10.5)
CHLORIDE SERPL-SCNC: 102 MMOL/L (ref 95–110)
CHLORIDE SERPL-SCNC: 102 MMOL/L (ref 95–110)
CHLORIDE SERPL-SCNC: 103 MMOL/L (ref 95–110)
CHLORIDE SERPL-SCNC: 104 MMOL/L (ref 95–110)
CHLORIDE SERPL-SCNC: 104 MMOL/L (ref 95–110)
CO2 SERPL-SCNC: 20 MMOL/L (ref 23–29)
CO2 SERPL-SCNC: 21 MMOL/L (ref 23–29)
CO2 SERPL-SCNC: 21 MMOL/L (ref 23–29)
CODING SYSTEM: NORMAL
CREAT SERPL-MCNC: 2 MG/DL (ref 0.5–1.4)
CREAT SERPL-MCNC: 2.2 MG/DL (ref 0.5–1.4)
CREAT SERPL-MCNC: 2.4 MG/DL (ref 0.5–1.4)
CREAT SERPL-MCNC: 2.4 MG/DL (ref 0.5–1.4)
CREAT SERPL-MCNC: 2.5 MG/DL (ref 0.5–1.4)
CREAT UR-MCNC: 84.2 MG/DL (ref 15–325)
DACRYOCYTES BLD QL SMEAR: ABNORMAL
DACRYOCYTES BLD QL SMEAR: ABNORMAL
DAT IGG-SP REAG RBC-IMP: NORMAL
DIFFERENTIAL METHOD: ABNORMAL
DISPENSE STATUS: NORMAL
EOSINOPHIL # BLD AUTO: 0.1 K/UL (ref 0–0.5)
EOSINOPHIL # BLD AUTO: 0.2 K/UL (ref 0–0.5)
EOSINOPHIL NFR BLD: 1.3 % (ref 0–8)
EOSINOPHIL NFR BLD: 1.8 % (ref 0–8)
EOSINOPHIL NFR BLD: 2 % (ref 0–8)
EOSINOPHIL NFR BLD: 2 % (ref 0–8)
ERYTHROCYTE [DISTWIDTH] IN BLOOD BY AUTOMATED COUNT: 22.8 % (ref 11.5–14.5)
ERYTHROCYTE [DISTWIDTH] IN BLOOD BY AUTOMATED COUNT: 23.5 % (ref 11.5–14.5)
ERYTHROCYTE [DISTWIDTH] IN BLOOD BY AUTOMATED COUNT: 23.9 % (ref 11.5–14.5)
ERYTHROCYTE [DISTWIDTH] IN BLOOD BY AUTOMATED COUNT: 23.9 % (ref 11.5–14.5)
EST. GFR  (AFRICAN AMERICAN): 26 ML/MIN/1.73 M^2
EST. GFR  (AFRICAN AMERICAN): 28 ML/MIN/1.73 M^2
EST. GFR  (AFRICAN AMERICAN): 28 ML/MIN/1.73 M^2
EST. GFR  (AFRICAN AMERICAN): 31 ML/MIN/1.73 M^2
EST. GFR  (AFRICAN AMERICAN): 34 ML/MIN/1.73 M^2
EST. GFR  (NON AFRICAN AMERICAN): 23 ML/MIN/1.73 M^2
EST. GFR  (NON AFRICAN AMERICAN): 24 ML/MIN/1.73 M^2
EST. GFR  (NON AFRICAN AMERICAN): 24 ML/MIN/1.73 M^2
EST. GFR  (NON AFRICAN AMERICAN): 27 ML/MIN/1.73 M^2
EST. GFR  (NON AFRICAN AMERICAN): 30 ML/MIN/1.73 M^2
FIBRINOGEN PPP-MCNC: 306 MG/DL (ref 182–366)
GLUCOSE SERPL-MCNC: 113 MG/DL (ref 70–110)
GLUCOSE SERPL-MCNC: 114 MG/DL (ref 70–110)
GLUCOSE SERPL-MCNC: 117 MG/DL (ref 70–110)
GLUCOSE SERPL-MCNC: 120 MG/DL (ref 70–110)
GLUCOSE SERPL-MCNC: 121 MG/DL (ref 70–110)
HAPTOGLOB SERPL-MCNC: 12 MG/DL (ref 30–250)
HAV IGM SERPL QL IA: NEGATIVE
HBV CORE IGM SERPL QL IA: NEGATIVE
HBV SURFACE AB SER-ACNC: NEGATIVE M[IU]/ML
HBV SURFACE AG SERPL QL IA: NEGATIVE
HCT VFR BLD AUTO: 20.6 % (ref 37–48.5)
HCT VFR BLD AUTO: 20.9 % (ref 37–48.5)
HCT VFR BLD AUTO: 23.5 % (ref 37–48.5)
HCT VFR BLD AUTO: 23.5 % (ref 37–48.5)
HCV AB SERPL QL IA: NEGATIVE
HGB BLD-MCNC: 6.7 G/DL (ref 12–16)
HGB BLD-MCNC: 6.9 G/DL (ref 12–16)
HGB BLD-MCNC: 7.7 G/DL (ref 12–16)
HGB BLD-MCNC: 7.7 G/DL (ref 12–16)
HGB BLD-MCNC: 8.3 G/DL (ref 12–16)
HYPOCHROMIA BLD QL SMEAR: ABNORMAL
HYPOCHROMIA BLD QL SMEAR: ABNORMAL
IMM GRANULOCYTES # BLD AUTO: 0.14 K/UL (ref 0–0.04)
IMM GRANULOCYTES # BLD AUTO: 0.15 K/UL (ref 0–0.04)
IMM GRANULOCYTES NFR BLD AUTO: 1.5 % (ref 0–0.5)
IMM GRANULOCYTES NFR BLD AUTO: 1.6 % (ref 0–0.5)
IMM GRANULOCYTES NFR BLD AUTO: 1.7 % (ref 0–0.5)
IMM GRANULOCYTES NFR BLD AUTO: 1.7 % (ref 0–0.5)
INR PPP: 1.6 (ref 0.8–1.2)
INR PPP: 1.6 (ref 0.8–1.2)
LYMPHOCYTES # BLD AUTO: 0.4 K/UL (ref 1–4.8)
LYMPHOCYTES # BLD AUTO: 0.4 K/UL (ref 1–4.8)
LYMPHOCYTES # BLD AUTO: 0.6 K/UL (ref 1–4.8)
LYMPHOCYTES # BLD AUTO: 0.7 K/UL (ref 1–4.8)
LYMPHOCYTES NFR BLD: 4.9 % (ref 18–48)
LYMPHOCYTES NFR BLD: 4.9 % (ref 18–48)
LYMPHOCYTES NFR BLD: 5.7 % (ref 18–48)
LYMPHOCYTES NFR BLD: 7.4 % (ref 18–48)
MAGNESIUM SERPL-MCNC: 1.8 MG/DL (ref 1.6–2.6)
MAGNESIUM SERPL-MCNC: 1.9 MG/DL (ref 1.6–2.6)
MAGNESIUM SERPL-MCNC: 1.9 MG/DL (ref 1.6–2.6)
MAGNESIUM SERPL-MCNC: 2 MG/DL (ref 1.6–2.6)
MAGNESIUM SERPL-MCNC: 2 MG/DL (ref 1.6–2.6)
MCH RBC QN AUTO: 31.3 PG (ref 27–31)
MCH RBC QN AUTO: 31.3 PG (ref 27–31)
MCH RBC QN AUTO: 32.1 PG (ref 27–31)
MCH RBC QN AUTO: 32.4 PG (ref 27–31)
MCHC RBC AUTO-ENTMCNC: 32.5 G/DL (ref 32–36)
MCHC RBC AUTO-ENTMCNC: 32.8 G/DL (ref 32–36)
MCHC RBC AUTO-ENTMCNC: 32.8 G/DL (ref 32–36)
MCHC RBC AUTO-ENTMCNC: 33 G/DL (ref 32–36)
MCV RBC AUTO: 96 FL (ref 82–98)
MCV RBC AUTO: 96 FL (ref 82–98)
MCV RBC AUTO: 98 FL (ref 82–98)
MCV RBC AUTO: 99 FL (ref 82–98)
MONOCYTES # BLD AUTO: 0.7 K/UL (ref 0.3–1)
MONOCYTES # BLD AUTO: 0.9 K/UL (ref 0.3–1)
MONOCYTES # BLD AUTO: 0.9 K/UL (ref 0.3–1)
MONOCYTES # BLD AUTO: 1 K/UL (ref 0.3–1)
MONOCYTES NFR BLD: 10.2 % (ref 4–15)
MONOCYTES NFR BLD: 10.2 % (ref 4–15)
MONOCYTES NFR BLD: 7.7 % (ref 4–15)
MONOCYTES NFR BLD: 9.8 % (ref 4–15)
NEUTROPHILS # BLD AUTO: 7 K/UL (ref 1.8–7.7)
NEUTROPHILS # BLD AUTO: 7 K/UL (ref 1.8–7.7)
NEUTROPHILS # BLD AUTO: 7.2 K/UL (ref 1.8–7.7)
NEUTROPHILS # BLD AUTO: 7.9 K/UL (ref 1.8–7.7)
NEUTROPHILS NFR BLD: 81 % (ref 38–73)
NEUTROPHILS NFR BLD: 81 % (ref 38–73)
NEUTROPHILS NFR BLD: 81.2 % (ref 38–73)
NEUTROPHILS NFR BLD: 81.5 % (ref 38–73)
NRBC BLD-RTO: 5 /100 WBC
NRBC BLD-RTO: 5 /100 WBC
NRBC BLD-RTO: 8 /100 WBC
NRBC BLD-RTO: 8 /100 WBC
NUM UNITS TRANS PACKED RBC: NORMAL
OVALOCYTES BLD QL SMEAR: ABNORMAL
PHOSPHATE SERPL-MCNC: 3.5 MG/DL (ref 2.7–4.5)
PLATELET # BLD AUTO: 49 K/UL (ref 150–350)
PLATELET # BLD AUTO: 53 K/UL (ref 150–350)
PLATELET # BLD AUTO: 53 K/UL (ref 150–350)
PLATELET # BLD AUTO: 59 K/UL (ref 150–350)
PLATELET BLD QL SMEAR: ABNORMAL
PLATELET BLD QL SMEAR: ABNORMAL
PMV BLD AUTO: 11 FL (ref 9.2–12.9)
PMV BLD AUTO: 11.3 FL (ref 9.2–12.9)
PMV BLD AUTO: 11.9 FL (ref 9.2–12.9)
PMV BLD AUTO: 11.9 FL (ref 9.2–12.9)
POIKILOCYTOSIS BLD QL SMEAR: ABNORMAL
POIKILOCYTOSIS BLD QL SMEAR: SLIGHT
POLYCHROMASIA BLD QL SMEAR: ABNORMAL
POLYCHROMASIA BLD QL SMEAR: ABNORMAL
POTASSIUM SERPL-SCNC: 3.5 MMOL/L (ref 3.5–5.1)
POTASSIUM SERPL-SCNC: 3.7 MMOL/L (ref 3.5–5.1)
POTASSIUM SERPL-SCNC: 3.9 MMOL/L (ref 3.5–5.1)
POTASSIUM SERPL-SCNC: 4 MMOL/L (ref 3.5–5.1)
POTASSIUM SERPL-SCNC: 4 MMOL/L (ref 3.5–5.1)
PROT SERPL-MCNC: 9.2 G/DL (ref 6–8.4)
PROTHROMBIN TIME: 16.5 SEC (ref 9–12.5)
PROTHROMBIN TIME: 16.6 SEC (ref 9–12.5)
RBC # BLD AUTO: 2.09 M/UL (ref 4–5.4)
RBC # BLD AUTO: 2.13 M/UL (ref 4–5.4)
RBC # BLD AUTO: 2.46 M/UL (ref 4–5.4)
RBC # BLD AUTO: 2.46 M/UL (ref 4–5.4)
SODIUM SERPL-SCNC: 137 MMOL/L (ref 136–145)
SODIUM UR-SCNC: 46 MMOL/L (ref 20–250)
STOMATOCYTES BLD QL SMEAR: PRESENT
TARGETS BLD QL SMEAR: ABNORMAL
TARGETS BLD QL SMEAR: ABNORMAL
UNIT NUMBER: NORMAL
WBC # BLD AUTO: 8.63 K/UL (ref 3.9–12.7)
WBC # BLD AUTO: 8.63 K/UL (ref 3.9–12.7)
WBC # BLD AUTO: 8.86 K/UL (ref 3.9–12.7)
WBC # BLD AUTO: 9.74 K/UL (ref 3.9–12.7)

## 2020-07-10 PROCEDURE — 86356 MONONUCLEAR CELL ANTIGEN: CPT | Mod: 91

## 2020-07-10 PROCEDURE — 82570 ASSAY OF URINE CREATININE: CPT

## 2020-07-10 PROCEDURE — 84100 ASSAY OF PHOSPHORUS: CPT

## 2020-07-10 PROCEDURE — 80048 BASIC METABOLIC PNL TOTAL CA: CPT | Mod: 91

## 2020-07-10 PROCEDURE — 83735 ASSAY OF MAGNESIUM: CPT

## 2020-07-10 PROCEDURE — 80048 BASIC METABOLIC PNL TOTAL CA: CPT

## 2020-07-10 PROCEDURE — 86880 COOMBS TEST DIRECT: CPT

## 2020-07-10 PROCEDURE — 85610 PROTHROMBIN TIME: CPT | Mod: ER

## 2020-07-10 PROCEDURE — P9012 CRYOPRECIPITATE EACH UNIT: HCPCS

## 2020-07-10 PROCEDURE — 83735 ASSAY OF MAGNESIUM: CPT | Mod: 91

## 2020-07-10 PROCEDURE — 25000003 PHARM REV CODE 250: Performed by: PHYSICIAN ASSISTANT

## 2020-07-10 PROCEDURE — 36430 TRANSFUSION BLD/BLD COMPNT: CPT

## 2020-07-10 PROCEDURE — 99900035 HC TECH TIME PER 15 MIN (STAT)

## 2020-07-10 PROCEDURE — S4991 NICOTINE PATCH NONLEGEND: HCPCS | Performed by: FAMILY MEDICINE

## 2020-07-10 PROCEDURE — 85610 PROTHROMBIN TIME: CPT | Mod: 91

## 2020-07-10 PROCEDURE — 63600175 PHARM REV CODE 636 W HCPCS: Performed by: FAMILY MEDICINE

## 2020-07-10 PROCEDURE — 99291 PR CRITICAL CARE, E/M 30-74 MINUTES: ICD-10-PCS | Mod: ,,, | Performed by: NURSE PRACTITIONER

## 2020-07-10 PROCEDURE — 27000221 HC OXYGEN, UP TO 24 HOURS

## 2020-07-10 PROCEDURE — 85730 THROMBOPLASTIN TIME PARTIAL: CPT

## 2020-07-10 PROCEDURE — 63600175 PHARM REV CODE 636 W HCPCS: Performed by: PHYSICIAN ASSISTANT

## 2020-07-10 PROCEDURE — P9016 RBC LEUKOCYTES REDUCED: HCPCS

## 2020-07-10 PROCEDURE — 63600175 PHARM REV CODE 636 W HCPCS: Performed by: NURSE ANESTHETIST, CERTIFIED REGISTERED

## 2020-07-10 PROCEDURE — 80053 COMPREHEN METABOLIC PANEL: CPT

## 2020-07-10 PROCEDURE — 37000008 HC ANESTHESIA 1ST 15 MINUTES: Performed by: INTERNAL MEDICINE

## 2020-07-10 PROCEDURE — 94761 N-INVAS EAR/PLS OXIMETRY MLT: CPT

## 2020-07-10 PROCEDURE — 25000003 PHARM REV CODE 250: Performed by: NURSE ANESTHETIST, CERTIFIED REGISTERED

## 2020-07-10 PROCEDURE — 94002 VENT MGMT INPAT INIT DAY: CPT

## 2020-07-10 PROCEDURE — 25000003 PHARM REV CODE 250: Performed by: FAMILY MEDICINE

## 2020-07-10 PROCEDURE — 99291 CRITICAL CARE FIRST HOUR: CPT | Mod: ,,, | Performed by: INTERNAL MEDICINE

## 2020-07-10 PROCEDURE — 85025 COMPLETE CBC W/AUTO DIFF WBC: CPT | Mod: 91

## 2020-07-10 PROCEDURE — 99291 CRITICAL CARE FIRST HOUR: CPT | Mod: ,,, | Performed by: NURSE PRACTITIONER

## 2020-07-10 PROCEDURE — 99291 PR CRITICAL CARE, E/M 30-74 MINUTES: ICD-10-PCS | Mod: ,,, | Performed by: INTERNAL MEDICINE

## 2020-07-10 PROCEDURE — 86965 POOLING BLOOD PLATELETS: CPT

## 2020-07-10 PROCEDURE — 83010 ASSAY OF HAPTOGLOBIN QUANT: CPT

## 2020-07-10 PROCEDURE — 25000003 PHARM REV CODE 250: Performed by: INTERNAL MEDICINE

## 2020-07-10 PROCEDURE — 84300 ASSAY OF URINE SODIUM: CPT

## 2020-07-10 PROCEDURE — 63600175 PHARM REV CODE 636 W HCPCS: Performed by: NURSE PRACTITIONER

## 2020-07-10 PROCEDURE — 37000009 HC ANESTHESIA EA ADD 15 MINS: Performed by: INTERNAL MEDICINE

## 2020-07-10 PROCEDURE — 85384 FIBRINOGEN ACTIVITY: CPT

## 2020-07-10 PROCEDURE — 20000000 HC ICU ROOM

## 2020-07-10 PROCEDURE — 85018 HEMOGLOBIN: CPT

## 2020-07-10 PROCEDURE — 99233 PR SUBSEQUENT HOSPITAL CARE,LEVL III: ICD-10-PCS | Mod: ,,, | Performed by: INTERNAL MEDICINE

## 2020-07-10 PROCEDURE — 99233 SBSQ HOSP IP/OBS HIGH 50: CPT | Mod: ,,, | Performed by: INTERNAL MEDICINE

## 2020-07-10 PROCEDURE — 43244 PR EGD, FLEX, W/BAND LIGATION, ESOPH/GASTR VARICES: ICD-10-PCS | Mod: ,,, | Performed by: INTERNAL MEDICINE

## 2020-07-10 PROCEDURE — 43244 EGD VARICES LIGATION: CPT | Performed by: INTERNAL MEDICINE

## 2020-07-10 PROCEDURE — 82140 ASSAY OF AMMONIA: CPT

## 2020-07-10 PROCEDURE — C9113 INJ PANTOPRAZOLE SODIUM, VIA: HCPCS | Performed by: FAMILY MEDICINE

## 2020-07-10 PROCEDURE — 27201022: Performed by: INTERNAL MEDICINE

## 2020-07-10 PROCEDURE — 36415 COLL VENOUS BLD VENIPUNCTURE: CPT

## 2020-07-10 PROCEDURE — 43244 EGD VARICES LIGATION: CPT | Mod: ,,, | Performed by: INTERNAL MEDICINE

## 2020-07-10 PROCEDURE — 25000003 PHARM REV CODE 250: Performed by: NURSE PRACTITIONER

## 2020-07-10 RX ORDER — GLYCOPYRROLATE 0.2 MG/ML
INJECTION INTRAMUSCULAR; INTRAVENOUS
Status: DISCONTINUED | OUTPATIENT
Start: 2020-07-10 | End: 2020-07-10

## 2020-07-10 RX ORDER — LACTULOSE 10 G/15ML
200 SOLUTION ORAL; RECTAL ONCE
Status: COMPLETED | OUTPATIENT
Start: 2020-07-10 | End: 2020-07-10

## 2020-07-10 RX ORDER — CHLORHEXIDINE GLUCONATE ORAL RINSE 1.2 MG/ML
15 SOLUTION DENTAL 2 TIMES DAILY
Status: DISCONTINUED | OUTPATIENT
Start: 2020-07-10 | End: 2020-07-11

## 2020-07-10 RX ORDER — LIDOCAINE HYDROCHLORIDE 10 MG/ML
INJECTION, SOLUTION EPIDURAL; INFILTRATION; INTRACAUDAL; PERINEURAL
Status: DISCONTINUED | OUTPATIENT
Start: 2020-07-10 | End: 2020-07-10

## 2020-07-10 RX ORDER — PROPOFOL 10 MG/ML
INJECTION, EMULSION INTRAVENOUS
Status: DISCONTINUED
Start: 2020-07-10 | End: 2020-07-10 | Stop reason: WASHOUT

## 2020-07-10 RX ORDER — KETAMINE HYDROCHLORIDE 50 MG/ML
INJECTION, SOLUTION INTRAMUSCULAR; INTRAVENOUS
Status: DISCONTINUED | OUTPATIENT
Start: 2020-07-10 | End: 2020-07-10

## 2020-07-10 RX ORDER — PROPOFOL 10 MG/ML
VIAL (ML) INTRAVENOUS
Status: DISCONTINUED | OUTPATIENT
Start: 2020-07-10 | End: 2020-07-10

## 2020-07-10 RX ORDER — MAGNESIUM SULFATE 1 G/100ML
1 INJECTION INTRAVENOUS ONCE
Status: COMPLETED | OUTPATIENT
Start: 2020-07-10 | End: 2020-07-10

## 2020-07-10 RX ORDER — LACTULOSE 10 G/15ML
200 SOLUTION ORAL; RECTAL EVERY 6 HOURS
Status: DISCONTINUED | OUTPATIENT
Start: 2020-07-10 | End: 2020-07-12

## 2020-07-10 RX ORDER — LACTULOSE 10 G/15ML
200 SOLUTION ORAL; RECTAL ONCE
Status: DISCONTINUED | OUTPATIENT
Start: 2020-07-10 | End: 2020-07-10 | Stop reason: SDUPTHER

## 2020-07-10 RX ORDER — FENTANYL CITRAT/DEXTROSE 5%/PF 100 MCG/10
PATIENT CONTROLLED ANALGESIA SYRINGE INTRAVENOUS CONTINUOUS
Status: DISCONTINUED | OUTPATIENT
Start: 2020-07-10 | End: 2020-07-11

## 2020-07-10 RX ORDER — SODIUM CHLORIDE, SODIUM LACTATE, POTASSIUM CHLORIDE, CALCIUM CHLORIDE 600; 310; 30; 20 MG/100ML; MG/100ML; MG/100ML; MG/100ML
INJECTION, SOLUTION INTRAVENOUS CONTINUOUS PRN
Status: DISCONTINUED | OUTPATIENT
Start: 2020-07-10 | End: 2020-07-10

## 2020-07-10 RX ORDER — SUCCINYLCHOLINE CHLORIDE 20 MG/ML
INJECTION INTRAMUSCULAR; INTRAVENOUS
Status: DISCONTINUED | OUTPATIENT
Start: 2020-07-10 | End: 2020-07-10

## 2020-07-10 RX ADMIN — PANTOPRAZOLE SODIUM 40 MG: 40 INJECTION, POWDER, LYOPHILIZED, FOR SOLUTION INTRAVENOUS at 08:07

## 2020-07-10 RX ADMIN — MAGNESIUM SULFATE 1 G: 1 INJECTION INTRAVENOUS at 10:07

## 2020-07-10 RX ADMIN — PROPOFOL 50 MG: 10 INJECTION, EMULSION INTRAVENOUS at 02:07

## 2020-07-10 RX ADMIN — LACTULOSE 200 G: 10 SOLUTION ORAL; RECTAL at 06:07

## 2020-07-10 RX ADMIN — SODIUM CHLORIDE, SODIUM LACTATE, POTASSIUM CHLORIDE, AND CALCIUM CHLORIDE: 600; 310; 30; 20 INJECTION, SOLUTION INTRAVENOUS at 01:07

## 2020-07-10 RX ADMIN — LORAZEPAM 2 MG: 2 INJECTION INTRAMUSCULAR; INTRAVENOUS at 02:07

## 2020-07-10 RX ADMIN — KETAMINE HYDROCHLORIDE 20 MG: 50 INJECTION INTRAMUSCULAR; INTRAVENOUS at 02:07

## 2020-07-10 RX ADMIN — CEFTRIAXONE 2 G: 2 INJECTION, SOLUTION INTRAVENOUS at 02:07

## 2020-07-10 RX ADMIN — Medication 50 MCG: at 03:07

## 2020-07-10 RX ADMIN — OCTREOTIDE ACETATE 50 MCG/HR: 500 INJECTION, SOLUTION INTRAVENOUS; SUBCUTANEOUS at 08:07

## 2020-07-10 RX ADMIN — PROPOFOL 50 MG: 10 INJECTION, EMULSION INTRAVENOUS at 01:07

## 2020-07-10 RX ADMIN — LACTULOSE 200 G: 10 SOLUTION ORAL; RECTAL at 12:07

## 2020-07-10 RX ADMIN — Medication 1 PATCH: at 08:07

## 2020-07-10 RX ADMIN — CHLORHEXIDINE GLUCONATE 0.12% ORAL RINSE 15 ML: 1.2 LIQUID ORAL at 08:07

## 2020-07-10 RX ADMIN — LACTULOSE 200 G: 10 SOLUTION ORAL; RECTAL at 11:07

## 2020-07-10 RX ADMIN — MUPIROCIN: 20 OINTMENT TOPICAL at 08:07

## 2020-07-10 RX ADMIN — LORAZEPAM 2 MG: 2 INJECTION INTRAMUSCULAR; INTRAVENOUS at 06:07

## 2020-07-10 RX ADMIN — LIDOCAINE HYDROCHLORIDE 50 MG: 10 INJECTION, SOLUTION EPIDURAL; INFILTRATION; INTRACAUDAL; PERINEURAL at 01:07

## 2020-07-10 RX ADMIN — PROPOFOL 100 MG: 10 INJECTION, EMULSION INTRAVENOUS at 01:07

## 2020-07-10 RX ADMIN — LACTULOSE 10 G: 20 SOLUTION ORAL at 08:07

## 2020-07-10 RX ADMIN — SUCCINYLCHOLINE CHLORIDE 100 MG: 20 INJECTION, SOLUTION INTRAMUSCULAR; INTRAVENOUS at 01:07

## 2020-07-10 RX ADMIN — KETAMINE HYDROCHLORIDE 20 MG: 50 INJECTION INTRAMUSCULAR; INTRAVENOUS at 01:07

## 2020-07-10 RX ADMIN — OCTREOTIDE ACETATE 50 MCG/HR: 500 INJECTION, SOLUTION INTRAVENOUS; SUBCUTANEOUS at 09:07

## 2020-07-10 RX ADMIN — GLYCOPYRROLATE 0.2 MG: 0.2 INJECTION INTRAMUSCULAR; INTRAVENOUS at 01:07

## 2020-07-10 RX ADMIN — LACTULOSE 200 G: 10 SOLUTION ORAL; RECTAL at 09:07

## 2020-07-10 NOTE — ASSESSMENT & PLAN NOTE
Due to tolerance of severely low Hgb and relatively stable vitals, acute or active GI bleed unlikely. However, we can't rule out chronic bleeding. Will plan on EGD once patient is more stable (Hgb above 7, INR around 1.5) Her older daughter would need to be contacted for consent.   Continue Octreotide ggt, Protonix IV and Rocephin.    Continue to monitor H/H closely and transfuse as indicated.

## 2020-07-10 NOTE — ASSESSMENT & PLAN NOTE
Longstanding history of alcoholic liver disease and hepatitis.  Patient continues to drink alcohol, last drink few weeks ago (per patient).  GI consult.  Patient received Lasix 60 mg IV x1 in the ED.  Reports noncompliance with all medications, took medications many months ago.    7/10:  Bilirubin continue to trend up  abd u/s did not show CBD dilatation  GI on case  Patient continues to drink per report

## 2020-07-10 NOTE — ASSESSMENT & PLAN NOTE
Gastroenterology is planning EGD, 3 units PRBC given since admission for admitting hemoglobin of 3.8.  Will need repeat CBC.  Monitor stools for melena.  Continue octreotide.    --daily CBC  --transfuse PRBC for hemoglobin less than 7  --hemoglobin 6.7 today, blood transfusion in progress at time of visit.  --patient given enema this morning, rectal tube clamped, unclear if melena has continued.

## 2020-07-10 NOTE — ASSESSMENT & PLAN NOTE
MELD score 39 this AM  Increased Bili T  Cont Octreotide, PPI, Rocephin and Lactulose  GI following:   Based on review of labs from Care Everywhere, the patient has chronically decompensated liver cirrhosis due to alcohol abuse with h/o varices and ascites.   Acute presentation could be secondary GI bleed; however, WBC count also elevated so need to rule out infection. Will order blood cultures and dx paracentesis. In the differential would also include acute alcoholic hepatitis (DF 40.2).   Agree with US of RUQ.   Patient confused. Not sure if HE, metabolic or related to other process. Will start with Lactulose enema x 1, then can change to oral if able to tolerate oral intake.   Monitor NH3, INR and CMP daily.

## 2020-07-10 NOTE — PLAN OF CARE
Patient currently resting quietly in bed. VS currently stable. Patient currently still only oriented to self. Patient given prn ativan for anxiety during the night. Patient NSR on monitor at this time. Patient currently receiving oxygen via 3L nasal cannula. Patient currently on octreotide drip at this time. Patient received 4 units of FFPs earlier in the shift. No signs of transfusion reaction noted. Patient turned in bed every 2 hours to avoid skin breakdown to back side. Patient turns independently. No sign of wound development or skin breakdown noted. Plan of care updated with family. There are no further questions after updated on plan of care at this time. No distress noted.  Will continue to monitor.

## 2020-07-10 NOTE — SUBJECTIVE & OBJECTIVE
Interval History: Patient continues to be altered. Was given ativan. No other acute events reported.     Review of Systems   Unable to perform ROS: Mental status change     Objective:     Vital Signs (Most Recent):  Temp: 99.1 °F (37.3 °C) (07/10/20 0817)  Pulse: 93 (07/10/20 0800)  Resp: (!) 24 (07/10/20 0817)  BP: (!) 179/84 (07/10/20 0817)  SpO2: 97 %(3L nc) (07/10/20 0817) Vital Signs (24h Range):  Temp:  [97.1 °F (36.2 °C)-99.1 °F (37.3 °C)] 99.1 °F (37.3 °C)  Pulse:  [] 93  Resp:  [13-40] 24  SpO2:  [88 %-100 %] 97 %  BP: (108-183)/() 179/84     Weight: 83.9 kg (185 lb)  Body mass index is 28.98 kg/m².    Intake/Output Summary (Last 24 hours) at 7/10/2020 1026  Last data filed at 7/10/2020 0950  Gross per 24 hour   Intake 3564.41 ml   Output 365 ml   Net 3199.41 ml      Physical Exam  Constitutional:       General: She is not in acute distress.     Appearance: She is well-developed. She is ill-appearing. She is not diaphoretic.   HENT:      Head: Normocephalic and atraumatic.   Eyes:      General: Scleral icterus present.      Pupils: Pupils are equal, round, and reactive to light.   Cardiovascular:      Rate and Rhythm: Normal rate and regular rhythm.      Heart sounds: Normal heart sounds. No murmur. No friction rub. No gallop.    Pulmonary:      Effort: Pulmonary effort is normal. No respiratory distress.      Breath sounds: Normal breath sounds. No stridor. No wheezing or rales.   Abdominal:      General: Bowel sounds are normal. There is no distension.      Palpations: Abdomen is soft. There is no mass.      Tenderness: There is no abdominal tenderness. There is no guarding.   Musculoskeletal:      Right lower leg: Edema present.      Left lower leg: Edema present.   Skin:     General: Skin is warm.      Coloration: Skin is jaundiced.      Findings: No erythema.   Neurological:      Mental Status: She is alert.      Comments: disoriented         Significant Labs:   Recent Lab Results        07/10/20  0801   07/10/20  0754   07/10/20  0359   07/10/20  0002   07/09/20  1823        Albumin     2.8         Alkaline Phosphatase     122         ALT     44         Ammonia   152           Anion Gap     14 15 14          15         Aniso     Slight Slight       aPTT 30.0  Comment:  aPTT therapeutic range = 39-69 seconds             AST     195         Baso #     0.03 0.02       Basophil%     0.3 0.2       BILIRUBIN TOTAL     19.8  Comment:  For infants and newborns, interpretation of results should be based  on gestational age, weight and in agreement with clinical  observations.  Premature Infant recommended reference ranges:  Up to 24 hours.............<8.0 mg/dL  Up to 48 hours............<12.0 mg/dL  3-5 days..................<15.0 mg/dL  6-29 days.................<15.0 mg/dL           BUN, Bld     43 42 36          43         Zev Cells       Occasional       Calcium     9.2 9.4 9.3          9.5         Chloride     103 102 103          102         CO2     20 20 19          20         Creatinine     2.4 2.4 2.2          2.5         Differential Method     Automated Automated       eGFR if      28 28 31          26         eGFR if non      24  Comment:  Calculation used to obtain the estimated glomerular filtration  rate (eGFR) is the CKD-EPI equation.    24  Comment:  Calculation used to obtain the estimated glomerular filtration  rate (eGFR) is the CKD-EPI equation.    27  Comment:  Calculation used to obtain the estimated glomerular filtration  rate (eGFR) is the CKD-EPI equation.             23  Comment:  Calculation used to obtain the estimated glomerular filtration  rate (eGFR) is the CKD-EPI equation.            Eos #     0.2 0.1       Eosinophil%     1.8 1.3       Glucose     113 117 96          114         Gran # (ANC)     7.2 7.9       Gran%     81.2 81.5       Hematocrit     20.6 20.9       Hemoglobin     6.7 6.9 6.6     Hypo     Occasional Moderate       Immature  Grans (Abs)     0.14  Comment:  Mild elevation in immature granulocytes is non specific and   can be seen in a variety of conditions including stress response,   acute inflammation, trauma and pregnancy. Correlation with other   laboratory and clinical findings is essential.   0.15  Comment:  Mild elevation in immature granulocytes is non specific and   can be seen in a variety of conditions including stress response,   acute inflammation, trauma and pregnancy. Correlation with other   laboratory and clinical findings is essential.         Immature Granulocytes     1.6 1.5       Lymph #     0.7 0.6       Lymph%     7.4 5.7       Magnesium     1.8 1.9 1.9          1.9         MCH     32.1 32.4       MCHC     32.5 33.0       MCV     99 98       Mono #     0.7 1.0       Mono%     7.7 9.8       MPV     11.3 11.0       nRBC     8 8       Ovalocytes     Occasional         Phosphorus     3.5         Platelet Estimate     Decreased Decreased       Platelets     59 49       Poik     Slight Moderate       Poly     Occasional Occasional       Potassium     4.0 4.0 4.1          3.9         PROTEIN TOTAL     9.2         RBC     2.09 2.13       RDW     23.5 22.8       Sodium     137 137 136          137         Stomatocytes       Present       Target Cells     Occasional Occasional       Tear Drop Cells     Occasional Occasional       WBC     8.86 9.74                        07/09/20  1457        Albumin       Alkaline Phosphatase       ALT       Ammonia       Anion Gap       Aniso       aPTT       AST       Baso #       Basophil%       BILIRUBIN TOTAL       BUN, Bld       Zev Cells       Calcium       Chloride       CO2       Creatinine       Differential Method       eGFR if        eGFR if non        Eos #       Eosinophil%       Glucose       Gran # (ANC)       Gran%       Hematocrit 18.3  Comment:  HCT critical result(s) called and verbal readback obtained from STAR SOLO RN by [email or web address]  07/09/2020 15:36       Hemoglobin       Hypo       Immature Grans (Abs)       Immature Granulocytes       Lymph #       Lymph%       Magnesium       MCH       MCHC       MCV       Mono #       Mono%       MPV       nRBC       Ovalocytes       Phosphorus       Platelet Estimate       Platelets       Poik       Poly       Potassium       PROTEIN TOTAL       RBC       RDW       Sodium       Stomatocytes       Target Cells       Tear Drop Cells       WBC           All pertinent labs within the past 24 hours have been reviewed.    Significant Imaging: I have reviewed all pertinent imaging results/findings within the past 24 hours.

## 2020-07-10 NOTE — ASSESSMENT & PLAN NOTE
SUNG on CKD stage 3, baseline s Cr not known however.  s Cr not worse, stable renal function  SUNG likely due to prerenal azotemia: hypotension, anemia, and diarrhea x 1 pre-admit  K normal  Metabolic acidosis  UOP stable    H/o of hep C noted  U/a shows no proteinuria or hematuria, therefore hep C likely has not affected the kidneys.

## 2020-07-10 NOTE — ASSESSMENT & PLAN NOTE
CO2 8, anion gap 23.  Check lactic acid.  Unable to aggressively hydrate her due to significant ascites and elevated BNP and shortness of breath.    7/10:  Resolved

## 2020-07-10 NOTE — INTERVAL H&P NOTE
The patient has been examined and the H&P has been reviewed:    I concur with the findings and no changes have occurred since H&P was written. Hb is now above 8, patient continuing with melena but hemodynamically stable.    Anesthesia/Surgery risks, benefits and alternative options discussed and understood by patient/family.          Active Hospital Problems    Diagnosis  POA    *Gastrointestinal hemorrhage with melena [K92.1]  Yes    Stage 3 chronic kidney disease [N18.3]  Unknown    Prerenal acute renal failure [N17.9]  Unknown    Decompensated HCV cirrhosis [B19.20, K74.69]  Yes    Coagulopathy [D68.9]  Yes    Metabolic acidosis, increased anion gap [E87.2]  Yes    Hypomagnesemia [E83.42]  Yes    Encephalopathy, metabolic [G93.41]  Yes    Alcohol abuse [F10.10]  Yes     Chronic    Acute renal failure [N17.9]  Yes      Resolved Hospital Problems   No resolved problems to display.       Proceed with EGD for concern of massive UGIB.

## 2020-07-10 NOTE — ASSESSMENT & PLAN NOTE
Secondary to decompensated cirrhosis  Also with thrombocytopenia  Transfused FFP and Plts  Daily INR

## 2020-07-10 NOTE — ASSESSMENT & PLAN NOTE
No active bleeding since admit  Plan EGD once transfusion complete and stable  GI following:   Due to tolerance of severely low Hgb and relatively stable vitals, acute or active GI bleed unlikely. However, we can't rule out chronic bleeding. Will plan on EGD once patient is more stable (Hgb above 7, INR around 1.5) We will also need to see who can consent for her if she remains confused.   Protonix IV 40 mg bid.   Presentation doesn't support variceal bleed but she has a history of variceal bleed so will bolus Octreotide and follow with infusion.   She will also need Rocephin 2 g daily.    Continue to monitor H/H closely. Agree with additional units of blood.

## 2020-07-10 NOTE — TRANSFER OF CARE
"Anesthesia Transfer of Care Note    Patient: John Wray    Procedure(s) Performed: Procedure(s) (LRB):  EGD (ESOPHAGOGASTRODUODENOSCOPY) (N/A)    Patient location: PACU    Anesthesia Type: general    Transport from OR: Transported from OR on room air with adequate spontaneous ventilation    Post pain: adequate analgesia    Post assessment: no apparent anesthetic complications    Post vital signs: stable    Level of consciousness: awake    Nausea/Vomiting: no nausea/vomiting    Complications: none    Transfer of care protocol was followed      Last vitals:   Visit Vitals  BP (!) 156/80   Pulse 91   Temp 37.1 °C (98.7 °F)   Resp (!) 23   Ht 5' 7" (1.702 m)   Wt 83.9 kg (185 lb)   LMP  (LMP Unknown)   SpO2 96%   Breastfeeding No   BMI 28.97 kg/m²     "

## 2020-07-10 NOTE — HOSPITAL COURSE
7/10: s/p 3 units pRBC, hgb still < 7. Will transfuse 1 more unit. Haptoglobin low concerned for hemolysis. Will repeat haptoglobin. Repeat PT/PTT. Continue FFP if needed. Plt improved after 1 units of plt given. Cont to monitor in ICU.   7/11 - Esophageal varices s/p banding. Intubated / Sedaetd. Discussed with cc team  7/12 - Tolerating Diet. Stable. Monitor     Patient 43 yo female admitted due to GIB requiring transfusion, prolonged intubation and EGD. EGD demonstrating Severe Varices 2/2 etoh abuse. Patient successfully extubated and recovered appropriately. Patient recommended by GI for follow up EGD and followup is scheduled. Patient tolerating diet and appropriate for d/c.

## 2020-07-10 NOTE — ASSESSMENT & PLAN NOTE
INR 2.0, not on oral anticoagulation.  Coagulopathy secondary to decompensated liver cirrhosis.  Vitamin K 10 mg IV x1.    7/10:  Continue to monitor   PT/PTT  FFP if needed   Fibrinogen > 100   Haptoglobin low, will repeat  Concern for hemolysis  Heme/onc on case

## 2020-07-10 NOTE — ASSESSMENT & PLAN NOTE
Has liver cirrhosis  Due to hep C and alcoholism  Decompensated, presented with GI bleed, likely has varices  Elevated bilirubin  Hypoalbuminemia  Hepatorenal syndrome is not unlikely, but is a diagnosis of exclusion  Will defer to GI

## 2020-07-10 NOTE — SUBJECTIVE & OBJECTIVE
Review of Systems   Unable to perform ROS: Mental acuity         Objective:     Vital Signs (Most Recent):  Temp: 99 °F (37.2 °C) (07/10/20 0305)  Pulse: 92 (07/10/20 0759)  Resp: (!) 29 (07/10/20 0759)  BP: (!) 182/83 (07/10/20 0636)  SpO2: (!) 93 % (07/10/20 0759) Vital Signs (24h Range):  Temp:  [97.1 °F (36.2 °C)-99.1 °F (37.3 °C)] 99 °F (37.2 °C)  Pulse:  [] 92  Resp:  [13-40] 29  SpO2:  [88 %-100 %] 93 %  BP: (108-182)/() 182/83     Weight: 83.9 kg (185 lb)  Body mass index is 28.98 kg/m².      Intake/Output Summary (Last 24 hours) at 7/10/2020 0814  Last data filed at 7/10/2020 0600  Gross per 24 hour   Intake 3064.41 ml   Output 415 ml   Net 2649.41 ml       Physical Exam  Constitutional:       General: She is sleeping. She is not in acute distress.     Appearance: She is well-developed and overweight. She is ill-appearing. She is not toxic-appearing or diaphoretic.      Interventions: She is not intubated.Nasal cannula in place.   HENT:      Head: Normocephalic and atraumatic.      Mouth/Throat:      Mouth: Mucous membranes are moist.   Eyes:      General: Lids are normal. Scleral icterus present.      Pupils: Pupils are equal, round, and reactive to light.   Neck:      Musculoskeletal: Normal range of motion.      Vascular: No carotid bruit.      Trachea: Trachea normal.   Cardiovascular:      Rate and Rhythm: Normal rate and regular rhythm.      Pulses:           Radial pulses are 2+ on the right side and 2+ on the left side.        Dorsalis pedis pulses are 1+ on the right side and 1+ on the left side.      Heart sounds: Normal heart sounds.   Pulmonary:      Effort: Pulmonary effort is normal. No tachypnea, accessory muscle usage or respiratory distress. She is not intubated.      Breath sounds: Normal breath sounds.   Abdominal:      General: Bowel sounds are decreased. There is distension (mild to moderate).      Palpations: Abdomen is soft.      Tenderness: There is no abdominal  tenderness.   Genitourinary:     Comments: Alicea in place  Musculoskeletal: Normal range of motion.      Right lower le+ Edema present.      Left lower le+ Edema present.      Right foot: No deformity.      Left foot: No deformity.   Lymphadenopathy:      Cervical: No cervical adenopathy.   Skin:     General: Skin is warm and dry.      Capillary Refill: Capillary refill takes less than 2 seconds.      Findings: No rash.   Neurological:      Mental Status: She is easily aroused.      Comments: Lethargic and sleeping post Ativan IVP but arouses to stimuli and mumbles verbal responses   Psychiatric:         Attention and Perception: She is inattentive.         Speech: Speech is slurred.         Behavior: Behavior is slowed.         Cognition and Memory: Cognition is impaired.         Vents:  Oxygen Concentration (%): 28 (07/10/20 0759)    Lines/Drains/Airways     Drain                 Urethral Catheter 20 0130 Latex 16 Fr. 1 day          Peripheral Intravenous Line                 Peripheral IV - Single Lumen 20 1849 20 G Right Antecubital 1 day         Peripheral IV - Single Lumen 20 0055 18 G Left Antecubital 1 day                Significant Labs:    CBC/Anemia Profile:  Recent Labs   Lab 20  2346  20  0408 20  1007 20  1457 20  1823 07/10/20  0002 07/10/20  0359   WBC  --   --  14.63*  --   --   --  9.74 8.86   HGB  --    < > 3.8*  --   --  6.6* 6.9* 6.7*   HCT  --    < > 12.9*  --  18.3*  --  20.9* 20.6*   PLT  --   --  49*  --   --   --  49* 59*   MCV  --   --  115*  --   --   --  98 99*   RDW  --   --  23.9*  --   --   --  22.8* 23.5*   FOLATE  --   --   --  4.0  --   --   --   --    [id number]  --   --   --  906  --   --   --   --    OCCULTBLOOD Positive*  --   --   --   --   --   --   --     < > = values in this interval not displayed.        Chemistries:  Recent Labs   Lab 20  1852  20  0408 20  1823 07/10/20  0002 07/10/20  0359   NA  134*  --  136 136 137 137  137   K 4.0  --  4.5 4.1 4.0 4.0  3.9     --  103 103 102 103  102   CO2 8*  --  10* 19* 20* 20*  20*   BUN 22*  --  25* 36* 42* 43*  43*   CREATININE 1.6*  --  2.1* 2.2* 2.4* 2.4*  2.5*   CALCIUM 9.2  --  9.0 9.3 9.4 9.2  9.5   ALBUMIN 2.2*  --  2.4*  --   --  2.8*   PROT 9.2*  --  9.2*  --   --  9.2*   BILITOT 14.9*  --  14.9*  --   --  19.8*   ALKPHOS 154*  --  135  --   --  122   ALT 44  --  42  --   --  44   *  --  226*  --   --  195*   MG  --    < > 1.1* 1.9 1.9 1.8  1.9   PHOS  --   --  4.7*  --   --  3.5    < > = values in this interval not displayed.       Coagulation:   Recent Labs   Lab 07/08/20  1852 07/09/20  1007   INR 2.0* 2.1*   APTT 34.7*  --      All pertinent labs within the past 24 hours have been reviewed.

## 2020-07-10 NOTE — PROGRESS NOTES
[facility] -   Hematology/Oncology  Progress Note    Patient Name: John Wray  Admission Date: 7/8/2020  Hospital Length of Stay: 1 days  Code Status: Full Code     Subjective:     HPI:  44 year old female, presented to the ER with weakness and SOB. Patient is currently confused and unable to provide any history, Hx obtained from the medical record and medical staff. Review of Care Everywhere shows a visit to [facility] on 08/27/19 for alcoholic cirrhosis with h/o esophageal varices, ascites and mild encephalopathy. She had positive HAV IgG and negative HCV RNA. HBV status unknown. 04/2019 US showed small ascites and CT scan showed diffuse fatty infiltration of liver with hepatosplenomegaly and enlarged gastric varices consistent with portal hypertension, and cholelithiasis. Baseline Hgb around 9. LFTs were similar to this admit with T. Bili over 10 and INR below 2.   Hgb: 3.8 after two units of blood, 2 units of FFP and 10mg Vit. K overnight. ER reported dark red stools overnight. No BM in ICU. INR: 2.0. LFT elevated. WBC elevated. Afebrile. No significant tachycardia and BP has been primarily normal to elevated. UA unremarkable. CXR showed possible process on the right. Nurse reports no complaints of pain from patient and no vomiting. Daughter at bedside reports patient continues to drink ETOH heavily, patient denies this.     Interval History:  Patient given 4 units of FFP overnight, INR results pending.  Hemoglobin 6.7 today blood transfusion in progress at time of visit.  Patient more confused and combative today, given Ativan overnight.  Placed in wrist restraints for safety.    Oncology Treatment Plan:   [No treatment plan]    Medications:  Continuous Infusions:   octreotide (SANDOSTATIN) infusion 50 mcg/hr (07/10/20 1050)     Scheduled Meds:   cefTRIAXone (ROCEPHIN) IVPB  2 g Intravenous Q24H    lactulose  200 g Rectal Q6H    magnesium sulfate IVPB  1 g Intravenous Once     mupirocin   Nasal BID    nicotine  1 patch Transdermal Daily    pantoprazole  40 mg Intravenous BID     PRN Meds:sodium chloride, morphine, ondansetron, sodium chloride 0.9%     Review of Systems   Unable to perform ROS: Mental status change     Objective:     Vital Signs (Most Recent):  Temp: 99.1 °F (37.3 °C) (07/10/20 1045)  Pulse: 87 (07/10/20 1045)  Resp: (!) 35 (07/10/20 1045)  BP: (!) 155/76 (07/10/20 1045)  SpO2: 96 % (07/10/20 1045) Vital Signs (24h Range):  Temp:  [97.1 °F (36.2 °C)-99.1 °F (37.3 °C)] 99.1 °F (37.3 °C)  Pulse:  [] 87  Resp:  [10-40] 35  SpO2:  [88 %-100 %] 96 %  BP: (114-183)/() 155/76     Weight: 83.9 kg (185 lb)  Body mass index is 28.98 kg/m².  Body surface area is 1.99 meters squared.      Intake/Output Summary (Last 24 hours) at 7/10/2020 1104  Last data filed at 7/10/2020 1045  Gross per 24 hour   Intake 3559.41 ml   Output 365 ml   Net 3194.41 ml       Physical Exam  Vitals signs and nursing note reviewed. Exam conducted with a chaperone present (daughter).   Constitutional:       General: She is not in acute distress.     Appearance: She is well-developed. She is ill-appearing.   HENT:      Head: Normocephalic and atraumatic.      Right Ear: Hearing and external ear normal.      Left Ear: Hearing and external ear normal.      Nose: No rhinorrhea.      Right Sinus: No maxillary sinus tenderness or frontal sinus tenderness.      Left Sinus: No maxillary sinus tenderness or frontal sinus tenderness.      Mouth/Throat:      Mouth: No oral lesions.      Pharynx: Uvula midline.      Comments: Dried blood around mouth, no clear source observed, likely bleeding from gums.  Eyes:      General:         Right eye: No discharge.         Left eye: No discharge.      Conjunctiva/sclera: Conjunctivae normal.      Pupils: Pupils are equal, round, and reactive to light.   Neck:      Musculoskeletal: Normal range of motion.      Thyroid: No thyromegaly.      Vascular: No carotid bruit.       Trachea: No tracheal deviation.   Cardiovascular:      Rate and Rhythm: Normal rate and regular rhythm.      Pulses:           Dorsalis pedis pulses are 2+ on the right side and 2+ on the left side.      Heart sounds: Normal heart sounds, S1 normal and S2 normal. No murmur.   Pulmonary:      Effort: Pulmonary effort is normal. No respiratory distress.      Breath sounds: Normal breath sounds. Examination of the right-middle field reveals rales. Examination of the left-middle field reveals rales. Examination of the right-lower field reveals decreased breath sounds. Examination of the left-lower field reveals decreased breath sounds.   Abdominal:      General: Bowel sounds are normal. There is no distension.      Palpations: Abdomen is soft. There is fluid wave. There is no mass.      Tenderness: There is no abdominal tenderness.   Musculoskeletal: Normal range of motion.         General: Swelling (Generalized) present.   Lymphadenopathy:      Cervical: No cervical adenopathy.      Upper Body:      Right upper body: No supraclavicular adenopathy.      Left upper body: No supraclavicular adenopathy.   Skin:     General: Skin is warm and dry.      Capillary Refill: Capillary refill takes less than 2 seconds.      Findings: No rash.   Neurological:      Mental Status: She is alert and oriented to person, place, and time. She is confused.      Sensory: No sensory deficit.      Coordination: Coordination normal.      Gait: Gait normal.   Psychiatric:         Mood and Affect: Mood is not depressed.         Behavior: Behavior is agitated.         Cognition and Memory: Cognition is impaired. Memory is impaired.      Comments: Patient more confused and combative today.  Placed in wrist restraints for safety.         Significant Labs:   CBC:   Recent Labs   Lab 07/09/20  0408 07/09/20  1457 07/09/20  1823 07/10/20  0002 07/10/20  0359   WBC 14.63*  --   --  9.74 8.86   HGB 3.8*  --  6.6* 6.9* 6.7*   HCT 12.9* 18.3*  --   20.9* 20.6*   PLT 49*  --   --  49* 59*    and CMP:   Recent Labs   Lab 07/08/20  1852 07/09/20  0408 07/09/20  1823 07/10/20  0002 07/10/20  0359   * 136 136 137 137  137   K 4.0 4.5 4.1 4.0 4.0  3.9    103 103 102 103  102   CO2 8* 10* 19* 20* 20*  20*   GLU 84 93 96 117* 113*  114*   BUN 22* 25* 36* 42* 43*  43*   CREATININE 1.6* 2.1* 2.2* 2.4* 2.4*  2.5*   CALCIUM 9.2 9.0 9.3 9.4 9.2  9.5   PROT 9.2* 9.2*  --   --  9.2*   ALBUMIN 2.2* 2.4*  --   --  2.8*   BILITOT 14.9* 14.9*  --   --  19.8*   ALKPHOS 154* 135  --   --  122   * 226*  --   --  195*   ALT 44 42  --   --  44   ANIONGAP 23* 23* 14 15 14  15   EGFRNONAA 39* 28* 27* 24* 24*  23*       Diagnostic Results:  I have reviewed all pertinent imaging results/findings within the past 24 hours.    Assessment/Plan:     * Gastrointestinal hemorrhage with melena  Gastroenterology is planning EGD, 3 units PRBC given since admission for admitting hemoglobin of 3.8.  Will need repeat CBC.  Monitor stools for melena.  Continue octreotide.    --daily CBC  --transfuse PRBC for hemoglobin less than 7  --hemoglobin 6.7 today, blood transfusion in progress at time of visit.  --patient given enema this morning, rectal tube clamped, unclear if melena has continued.    Alcohol abuse  Per review of history, extensive history of alcohol abuse and cirrhosis of the liver.  Discussion with daughter she reports heavy drinking over this weekend.    --case management to assist with possible rehab placement at discharge    Acute blood loss anemia  Patient with history of cirrhosis of the liver with esophageal varices per review of chart in Care everywhere.  Extensive history of alcohol abuse, reports of melena in ER.  Patient given 2 units of FFP and 10 mg of vitamin K overnight for elevated INR, given 3 units of PRBC.  GI following and plans for possible EGD.    --daily CBC CMP  --repeat H&H following 3rd unit of PRBC  --transfuse with PRBC for hemoglobin  less than 7    SUNG (acute kidney injury)  Likely secondary to volume depletion, will continue to transfuse until hemoglobin greater than 7.  Nephrology following.    --management per Nephrology    Coagulopathy  Secondary to cirrhosis of the liver, elevated INR and APTT on admission, negative for report of taking an anticoagulant.  Given 2 units FFP and 10 mg vitamin K overnight on 7/9/2020, given an additional 4 units of FFP on 07/10.  INR pending.  Goal is to have hemoglobin greater than 7 prior to EGD per GI.    --daily CBC to monitor for bleeding      Decompensated HCV cirrhosis  Management per hepatology.  Ammonia level more elevated today.  Concerning for worsening liver failure.        Thank you for your consult. I will follow-up with patient. Please contact us if you have any additional questions.     Kari Toribio NP  Hematology/Oncology  [facility]

## 2020-07-10 NOTE — PROGRESS NOTES
Dr. Plasencia notified of patients hemoglobin at 1823 was 6.6. MD made aware that the hemoglobin was drawn prior to last unit of PRBCs finishing. MD orders to finish last unit of FFP and drawn a CBC at 2300. Patient currently resting quietly on nasal cannula. No sign of bleeding noted at this time. Vital signs stable. Will continue to monitor.

## 2020-07-10 NOTE — PROGRESS NOTES
[facility] Hospital Medicine  Progress Note    Patient Name: John Wray  MRN: [id number]  Patient Class: IP- Inpatient   Admission Date: 7/8/2020  Length of Stay: 1 days  Attending Physician: Holger Flower MD  Primary Care Provider: Provider [facility]        Subjective:     Principal Problem:Gastrointestinal hemorrhage with melena        HPI:  Ms. Wray is a 44-year-old  female with known history of liver cirrhosis from chronic alcohol abuse and hepatitis, presented to the ED complaining of worsening abdominal distention and shortness of breath the past few days.  Reports last alcoholic drink few weeks ago.  Patient is a very poor historian.  She also describes black tarry stools for the past few weeks, but has not sought medical attention until today.  Hemoglobin was undetectable upon initial arrival.  She received 1.5 unit of PRBC transfusion so far, repeat CBC reveals hemoglobin 3.8, hematocrit 12.9, WBC 93345, with platelets 17874.  INR 2.0, not on chronic anticoagulation.  Creatinine elevated at 1.6, CO2 8.  Total bilirubin 14.9, , ALT 44.  Ammonia 65.  BNP 28 29.  Troponin 0.092.  Stool Hemoccult test is positive.  Patient is hemodynamically stable, blood pressure 141/65.  Patient reports history of paracentesis long time ago.    Admitting diagnosis GI bleed with melena, severe symptomatic anemia, hemoglobin initially undetectable, now 3.8 after 1.5 unit PRBC transfusion.  Decompensated liver cirrhosis.    Overview/Hospital Course:  7/10: s/p 3 units pRBC, hgb still < 7. Will transfuse 1 more unit. Haptoglobin low concerned for hemolysis. Will repeat haptoglobin. Repeat PT/PTT. Continue FFP if needed. Plt improved after 1 units of plt given. Cont to monitor in ICU.     Interval History: Patient continues to be altered. Was given ativan. No other acute events reported.     Review of Systems   Unable to perform ROS: Mental status change     Objective:     Vital Signs (Most  Recent):  Temp: 99.1 °F (37.3 °C) (07/10/20 0817)  Pulse: 93 (07/10/20 0800)  Resp: (!) 24 (07/10/20 0817)  BP: (!) 179/84 (07/10/20 0817)  SpO2: 97 %(3L nc) (07/10/20 0817) Vital Signs (24h Range):  Temp:  [97.1 °F (36.2 °C)-99.1 °F (37.3 °C)] 99.1 °F (37.3 °C)  Pulse:  [] 93  Resp:  [13-40] 24  SpO2:  [88 %-100 %] 97 %  BP: (108-183)/() 179/84     Weight: 83.9 kg (185 lb)  Body mass index is 28.98 kg/m².    Intake/Output Summary (Last 24 hours) at 7/10/2020 1026  Last data filed at 7/10/2020 0950  Gross per 24 hour   Intake 3564.41 ml   Output 365 ml   Net 3199.41 ml      Physical Exam  Constitutional:       General: She is not in acute distress.     Appearance: She is well-developed. She is ill-appearing. She is not diaphoretic.   HENT:      Head: Normocephalic and atraumatic.   Eyes:      General: Scleral icterus present.      Pupils: Pupils are equal, round, and reactive to light.   Cardiovascular:      Rate and Rhythm: Normal rate and regular rhythm.      Heart sounds: Normal heart sounds. No murmur. No friction rub. No gallop.    Pulmonary:      Effort: Pulmonary effort is normal. No respiratory distress.      Breath sounds: Normal breath sounds. No stridor. No wheezing or rales.   Abdominal:      General: Bowel sounds are normal. There is no distension.      Palpations: Abdomen is soft. There is no mass.      Tenderness: There is no abdominal tenderness. There is no guarding.   Musculoskeletal:      Right lower leg: Edema present.      Left lower leg: Edema present.   Skin:     General: Skin is warm.      Coloration: Skin is jaundiced.      Findings: No erythema.   Neurological:      Mental Status: She is alert.      Comments: disoriented         Significant Labs:   Recent Lab Results       07/10/20  0801   07/10/20  0754   07/10/20  0359   07/10/20  0002   07/09/20  1823        Albumin     2.8         Alkaline Phosphatase     122         ALT     44         Ammonia   152           Anion Gap      14 15 14          15         Aniso     Slight Slight       aPTT 30.0  Comment:  aPTT therapeutic range = 39-69 seconds             AST     195         Baso #     0.03 0.02       Basophil%     0.3 0.2       BILIRUBIN TOTAL     19.8  Comment:  For infants and newborns, interpretation of results should be based  on gestational age, weight and in agreement with clinical  observations.  Premature Infant recommended reference ranges:  Up to 24 hours.............<8.0 mg/dL  Up to 48 hours............<12.0 mg/dL  3-5 days..................<15.0 mg/dL  6-29 days.................<15.0 mg/dL           BUN, Bld     43 42 36          43         Zev Cells       Occasional       Calcium     9.2 9.4 9.3          9.5         Chloride     103 102 103          102         CO2     20 20 19          20         Creatinine     2.4 2.4 2.2          2.5         Differential Method     Automated Automated       eGFR if      28 28 31          26         eGFR if non      24  Comment:  Calculation used to obtain the estimated glomerular filtration  rate (eGFR) is the CKD-EPI equation.    24  Comment:  Calculation used to obtain the estimated glomerular filtration  rate (eGFR) is the CKD-EPI equation.    27  Comment:  Calculation used to obtain the estimated glomerular filtration  rate (eGFR) is the CKD-EPI equation.             23  Comment:  Calculation used to obtain the estimated glomerular filtration  rate (eGFR) is the CKD-EPI equation.            Eos #     0.2 0.1       Eosinophil%     1.8 1.3       Glucose     113 117 96          114         Gran # (ANC)     7.2 7.9       Gran%     81.2 81.5       Hematocrit     20.6 20.9       Hemoglobin     6.7 6.9 6.6     Hypo     Occasional Moderate       Immature Grans (Abs)     0.14  Comment:  Mild elevation in immature granulocytes is non specific and   can be seen in a variety of conditions including stress response,   acute inflammation, trauma and pregnancy.  Correlation with other   laboratory and clinical findings is essential.   0.15  Comment:  Mild elevation in immature granulocytes is non specific and   can be seen in a variety of conditions including stress response,   acute inflammation, trauma and pregnancy. Correlation with other   laboratory and clinical findings is essential.         Immature Granulocytes     1.6 1.5       Lymph #     0.7 0.6       Lymph%     7.4 5.7       Magnesium     1.8 1.9 1.9          1.9         MCH     32.1 32.4       MCHC     32.5 33.0       MCV     99 98       Mono #     0.7 1.0       Mono%     7.7 9.8       MPV     11.3 11.0       nRBC     8 8       Ovalocytes     Occasional         Phosphorus     3.5         Platelet Estimate     Decreased Decreased       Platelets     59 49       Poik     Slight Moderate       Poly     Occasional Occasional       Potassium     4.0 4.0 4.1          3.9         PROTEIN TOTAL     9.2         RBC     2.09 2.13       RDW     23.5 22.8       Sodium     137 137 136          137         Stomatocytes       Present       Target Cells     Occasional Occasional       Tear Drop Cells     Occasional Occasional       WBC     8.86 9.74                        07/09/20  1457        Albumin       Alkaline Phosphatase       ALT       Ammonia       Anion Gap       Aniso       aPTT       AST       Baso #       Basophil%       BILIRUBIN TOTAL       BUN, Bld       Slater Cells       Calcium       Chloride       CO2       Creatinine       Differential Method       eGFR if        eGFR if non        Eos #       Eosinophil%       Glucose       Gran # (ANC)       Gran%       Hematocrit 18.3  Comment:  HCT critical result(s) called and verbal readback obtained from STAR SOLO RN by SAM 07/09/2020 15:36       Hemoglobin       Hypo       Immature Grans (Abs)       Immature Granulocytes       Lymph #       Lymph%       Magnesium       MCH       MCHC       MCV       Mono #       Mono%        MPV       nRBC       Ovalocytes       Phosphorus       Platelet Estimate       Platelets       Poik       Poly       Potassium       PROTEIN TOTAL       RBC       RDW       Sodium       Stomatocytes       Target Cells       Tear Drop Cells       WBC           All pertinent labs within the past 24 hours have been reviewed.    Significant Imaging: I have reviewed all pertinent imaging results/findings within the past 24 hours.      Assessment/Plan:      * Gastrointestinal hemorrhage with melena  GI bleed with melena.  Hemoglobin 3.8 after 1.5 unit PRBC transfusion.  Keep NPO.  GI consulted.  For possible EGD today.    7/10:  Hgb < 7 this am  Will transfuse 1 unit pRBC  GI consulted on case       Alcohol abuse        Encephalopathy, metabolic  7/10:  Ammonia level continue to be elevated  Patient refusing oral lactulose  Will continue lactulose rectally         Acute renal failure  7/10:  Creatinine 2.4  Urine output poor despite lasix   Nephrology consulted on case      Encephalopathy  Ammonia 65.  Patient is intermittently confused.  Will need lactulose after EGD.      Acute blood loss anemia  Severe symptomatic anemia.  Hemoglobin was undetectable initially one in the ED presentation, currently 3.8 after 1.5 units of PRBC transfusion.  Transfused total of 3 units PRBC.  Repeat H&H and may need further transfusion after that.  Closely monitor in the ICU.  Patient denies hematemesis.      Metabolic acidosis, increased anion gap  CO2 8, anion gap 23.  Check lactic acid.  Unable to aggressively hydrate her due to significant ascites and elevated BNP and shortness of breath.    7/10:  Resolved      SUNG (acute kidney injury)  Serum creatinine elevated 1.6, likely secondary to gastrointestinal bleeding.  Transfuse 3 units PRBC total.  Repeat labs in a.m..  Consider nephrology consult.      Coagulopathy  INR 2.0, not on oral anticoagulation.  Coagulopathy secondary to decompensated liver cirrhosis.  Vitamin K 10 mg IV  x1.    7/10:  Continue to monitor   PT/PTT  FFP if needed   Fibrinogen > 100   Haptoglobin low, will repeat  Concern for hemolysis  Heme/onc on case       Decompensated HCV cirrhosis  Longstanding history of alcoholic liver disease and hepatitis.  Patient continues to drink alcohol, last drink few weeks ago (per patient).  GI consult.  Patient received Lasix 60 mg IV x1 in the ED.  Reports noncompliance with all medications, took medications many months ago.    7/10:  Bilirubin continue to trend up  abd u/s did not show CBD dilatation  GI on case  Patient continues to drink per report        VTE Risk Mitigation (From admission, onward)         Ordered     Place sequential compression device  Until discontinued      07/09/20 0000                Critical care time spent on the evaluation and treatment of severe organ dysfunction, review of pertinent labs and imaging studies, discussions with consulting providers and discussions with patient/family: 40 minutes.      Holger Flower MD  Department of Hospital Medicine   [facility]

## 2020-07-10 NOTE — PROGRESS NOTES
[facility] -   Critical Care Medicine  Progress Note    Patient Name: John Wray  MRN: [id number]  Admission Date: 7/8/2020  Hospital Length of Stay: 1 days  Code Status: Full Code  Attending Provider: Holger Flower MD  Primary Care Provider: No primary care provider on file.   Principal Problem: Gastrointestinal hemorrhage with melena    Subjective:     HPI:  Ms Wray is a 45 yo obese BF with a PMH of daily etoh use, Cirrhosis, Ascites, MDD, Hep C, HTN and tobacco abuse.  She presented to the ED on 7/8 due to worsening abdominal distension and SOB. Pt also described dark tarry stools consistent with melena. Per ED, pt's hgb was undetectable on arrival so she received 1.5 units of packed RBCs, which brought her hbg up to 3.8 and hct to 12.9. The rest of pt's CBC showed WBC 54030 and plts 4900. INR 2, Cr 1.6, CO2 8, AG 23, Mg 1.1,Total bilirubin 14.9. AST/ALT ratio >2. Ammonia 65. BNP 2829. Troponin 0.092. Stool occult blood test positive. Pt was hemodynamically stable in the ED and was admitted to ICU for presumptive dx of GI bleed w/ melena, severe symptomatic anemia, and decompensated liver cirrhosis with GI consult requested.     Hospital/ICU Course:  7/9 - This AM patient fully awake, alert and responsive but very confused and restless at times requiring frequent redirection.  She is receiving PRBCs.  She had melena at home and BRBPR in ED but no rectal bleeding since admit overnight.  VSS and she is in no distress talking on cell phone with daughter.   7/10 - Restless and confused last 24 hours now sedated and sleeping post Ativan IVP overnight.  No distress.  No active bleeding still since admit.      Review of Systems   Unable to perform ROS: Mental acuity         Objective:     Vital Signs (Most Recent):  Temp: 99 °F (37.2 °C) (07/10/20 0305)  Pulse: 92 (07/10/20 0759)  Resp: (!) 29 (07/10/20 0759)  BP: (!) 182/83 (07/10/20 0636)  SpO2: (!) 93 % (07/10/20 0759) Vital Signs (24h Range):  Temp:   [97.1 °F (36.2 °C)-99.1 °F (37.3 °C)] 99 °F (37.2 °C)  Pulse:  [] 92  Resp:  [13-40] 29  SpO2:  [88 %-100 %] 93 %  BP: (108-182)/() 182/83     Weight: 83.9 kg (185 lb)  Body mass index is 28.98 kg/m².      Intake/Output Summary (Last 24 hours) at 7/10/2020 0814  Last data filed at 7/10/2020 0600  Gross per 24 hour   Intake 3064.41 ml   Output 415 ml   Net 2649.41 ml       Physical Exam  Constitutional:       General: She is sleeping. She is not in acute distress.     Appearance: She is well-developed and overweight. She is ill-appearing. She is not toxic-appearing or diaphoretic.      Interventions: She is not intubated.Nasal cannula in place.   HENT:      Head: Normocephalic and atraumatic.      Mouth/Throat:      Mouth: Mucous membranes are moist.   Eyes:      General: Lids are normal. Scleral icterus present.      Pupils: Pupils are equal, round, and reactive to light.   Neck:      Musculoskeletal: Normal range of motion.      Vascular: No carotid bruit.      Trachea: Trachea normal.   Cardiovascular:      Rate and Rhythm: Normal rate and regular rhythm.      Pulses:           Radial pulses are 2+ on the right side and 2+ on the left side.        Dorsalis pedis pulses are 1+ on the right side and 1+ on the left side.      Heart sounds: Normal heart sounds.   Pulmonary:      Effort: Pulmonary effort is normal. No tachypnea, accessory muscle usage or respiratory distress. She is not intubated.      Breath sounds: Normal breath sounds.   Abdominal:      General: Bowel sounds are decreased. There is distension (mild to moderate).      Palpations: Abdomen is soft.      Tenderness: There is no abdominal tenderness.   Genitourinary:     Comments: Alicea in place  Musculoskeletal: Normal range of motion.      Right lower le+ Edema present.      Left lower le+ Edema present.      Right foot: No deformity.      Left foot: No deformity.   Lymphadenopathy:      Cervical: No cervical adenopathy.   Skin:      General: Skin is warm and dry.      Capillary Refill: Capillary refill takes less than 2 seconds.      Findings: No rash.   Neurological:      Mental Status: She is easily aroused.      Comments: Lethargic and sleeping post Ativan IVP but arouses to stimuli and mumbles verbal responses   Psychiatric:         Attention and Perception: She is inattentive.         Speech: Speech is slurred.         Behavior: Behavior is slowed.         Cognition and Memory: Cognition is impaired.         Vents:  Oxygen Concentration (%): 28 (07/10/20 0759)    Lines/Drains/Airways     Drain                 Urethral Catheter 07/09/20 0130 Latex 16 Fr. 1 day          Peripheral Intravenous Line                 Peripheral IV - Single Lumen 07/08/20 1849 20 G Right Antecubital 1 day         Peripheral IV - Single Lumen 07/09/20 0055 18 G Left Antecubital 1 day                Significant Labs:    CBC/Anemia Profile:  Recent Labs   Lab 07/08/20  2346  07/09/20  0408 07/09/20  1007 07/09/20  1457 07/09/20  1823 07/10/20  0002 07/10/20  0359   WBC  --   --  14.63*  --   --   --  9.74 8.86   HGB  --    < > 3.8*  --   --  6.6* 6.9* 6.7*   HCT  --    < > 12.9*  --  18.3*  --  20.9* 20.6*   PLT  --   --  49*  --   --   --  49* 59*   MCV  --   --  115*  --   --   --  98 99*   RDW  --   --  23.9*  --   --   --  22.8* 23.5*   FOLATE  --   --   --  4.0  --   --   --   --    [id number]  --   --   --  906  --   --   --   --    OCCULTBLOOD Positive*  --   --   --   --   --   --   --     < > = values in this interval not displayed.        Chemistries:  Recent Labs   Lab 07/08/20  1852  07/09/20  0408 07/09/20  1823 07/10/20  0002 07/10/20  0359   *  --  136 136 137 137  137   K 4.0  --  4.5 4.1 4.0 4.0  3.9     --  103 103 102 103  102   CO2 8*  --  10* 19* 20* 20*  20*   BUN 22*  --  25* 36* 42* 43*  43*   CREATININE 1.6*  --  2.1* 2.2* 2.4* 2.4*  2.5*   CALCIUM 9.2  --  9.0 9.3 9.4 9.2  9.5   ALBUMIN 2.2*  --  2.4*  --   --  2.8*    PROT 9.2*  --  9.2*  --   --  9.2*   BILITOT 14.9*  --  14.9*  --   --  19.8*   ALKPHOS 154*  --  135  --   --  122   ALT 44  --  42  --   --  44   *  --  226*  --   --  195*   MG  --    < > 1.1* 1.9 1.9 1.8  1.9   PHOS  --   --  4.7*  --   --  3.5    < > = values in this interval not displayed.       Coagulation:   Recent Labs   Lab 07/08/20  1852 07/09/20  1007   INR 2.0* 2.1*   APTT 34.7*  --      All pertinent labs within the past 24 hours have been reviewed.      Assessment/Plan:     Neuro  Encephalopathy, metabolic  Likely multifactorial: Anemia, Met acidosis, elevated ammonia and SUNG  Transfuse again today  Cont Lactulose  Much improved - now off Bicarb infusion  Follow up labs  Renal following  NH3 pending    Psychiatric  Alcohol abuse  Admit etoh < 10  Encourage etoh cessation once more coherent    Renal/  Hypomagnesemia  Defer to Renal  Follow up labs    SUNG (acute kidney injury)  Transfuse blood products  BP stable  Follow up BMP  Accurate I/Os  Renal following:   Likely due to severe anemia (Hgb was initially undetectable and increased to 3.8 after 2 units of blood). Creatinine was 0.8 on 4/15/19. Creatinine has increased to 2.1 today. She made 150 cc of urine overnight. Avoid NSAIDs, ACE-I, ARB. No indication for dialysis at present. Will monitor closely.     Metabolic acidosis, increased anion gap  Much improved  Off Bicarb infusion  Renal following  Follow up labs    Hematology  Coagulopathy  Secondary to decompensated cirrhosis  Also with thrombocytopenia  Transfused FFP and Plts  Daily INR    Oncology  Acute blood loss anemia  Transfusing 1 unit PRBC this AM for total of 6 this admit  Will give Cryo given this is 6th unit PRBC  Repeat CBC post transfusion  No active bleeding since admit  Hemodynamically stable  Follow H/H  GI following planning EGD    GI  * Gastrointestinal hemorrhage with melena  No active bleeding since admit  Plan EGD once transfusion complete and stable  GI  following:   Due to tolerance of severely low Hgb and relatively stable vitals, acute or active GI bleed unlikely. However, we can't rule out chronic bleeding. Will plan on EGD once patient is more stable (Hgb above 7, INR around 1.5) We will also need to see who can consent for her if she remains confused.   Protonix IV 40 mg bid.   Presentation doesn't support variceal bleed but she has a history of variceal bleed so will bolus Octreotide and follow with infusion.   She will also need Rocephin 2 g daily.    Continue to monitor H/H closely. Agree with additional units of blood.       Decompensated HCV cirrhosis  MELD score 39 this AM  Increased Bili T  Cont Octreotide, PPI, Rocephin and Lactulose  GI following:   Based on review of labs from Care Everywhere, the patient has chronically decompensated liver cirrhosis due to alcohol abuse with h/o varices and ascites.   Acute presentation could be secondary GI bleed; however, WBC count also elevated so need to rule out infection. Will order blood cultures and dx paracentesis. In the differential would also include acute alcoholic hepatitis (DF 40.2).   Agree with US of RUQ.   Patient confused. Not sure if HE, metabolic or related to other process. Will start with Lactulose enema x 1, then can change to oral if able to tolerate oral intake.   Monitor NH3, INR and CMP daily.           Preventive Measures and Monitoring:   Stress Ulcer: PPI  Nutrition: NPO for EGD  Glucose control:  stable  Bowel prophylaxis: active GI bleed.  On Lactulose  DVT prophylaxis: INR 2 with GI bleed.  Has SCDs  Hx CAD on B-Blocker: none due to GI bleed  Head of Bed/Reposition: Elevate HOB and turn Q1-2 hours   Early Mobility: bed rest  Alicea Day: #3  IVAB Day: #2  Code Status: Full  Pneumonia Vaccine: ordered     Counseling/Consultation:I have discussed the care of this patient in detail with the bedside nursing staff and Dr. Rondon and GARFIELD SARMIENTO     Critical Care Time: 48  minutes  Critical secondary to Patient has a condition that poses threat to life and bodily function: Acute Renal Failure  Patient has an abrupt change in neurologic status: Met Encephalopathy     Critical care was time spent personally by me on the following activities: development of treatment plan with patient or surrogate and bedside caregivers, discussions with consultants, evaluation of patient's response to treatment, examination of patient, ordering and performing treatments and interventions, ordering and review of laboratory studies, ordering and review of radiographic studies, pulse oximetry, re-evaluation of patient's condition. This critical care time did not overlap with that of any other provider or involve time for any procedures.     Chepe Bahena NP  Critical Care Medicine  [facility]

## 2020-07-10 NOTE — ASSESSMENT & PLAN NOTE
Management per hepatology.  Ammonia level more elevated today.  Concerning for worsening liver failure.

## 2020-07-10 NOTE — PROGRESS NOTES
[facility] -   Nephrology  Progress Note    Patient Name: John Wray  MRN: [id number]  Admission Date: 7/8/2020  Hospital Length of Stay: 1 days  Attending Provider: Holger Flower MD   Primary Care Physician: Provider Notinsystem  Principal Problem:Gastrointestinal hemorrhage with melena    Subjective:     HPI: 44 year old female with HTN, portal hypertension, EtOH abuse, liver cirrhosis, Hep C presents to [facility] with severe GI bleed. Patient s/p blood transfusion with 2 units of PRBC in ER. Hgb was 3.8 following transfusion. She is receiving 3rd PRBC at present.  Patient also presents with SUNG and creatinine of 2.1 and metabolic acidosis.    Nephrology was consulted to help with patient's renal care while she is admitted at [facility]. I saw and examined patient in her hospital room. Patient reports that SOB and weakness have improved. She reports diarrhea yesterday. No nausea/vomiting. No pain at present, no LE edema. She made about 150 cc of urine overnight (recorded).     Chart review revealed creatinine of 0.8 on 4/15/19.     Interval History: Pt was seen and examined in ICU. Pt is a 43 y/o female with  Chronic liver disease due to alcoholism and hep C who presented with GI bleed (Hgb 3.8), hypotension, and weakness. Pt has SUNG on CKD. Stage 3  (though baseline s Cr is not known). This am, pt has no c/o's, no new issues. No SOB, no abd pain,.    Review of patient's allergies indicates:  No Known Allergies  Current Facility-Administered Medications   Medication Frequency    0.9%  NaCl infusion (for blood administration) Q24H PRN    cefTRIAXone (ROCEPHIN) 2 g/50 mL D5W IVPB Q24H    lactulose 10 gram/15 mL solution (enema) 200 g Q6H    magnesium sulfate in dextrose IVPB (premix) 1 g Once    morphine injection 2 mg Q4H PRN    mupirocin 2 % ointment BID    nicotine 21 mg/24 hr 1 patch Daily    octreotide (SANDOSTATIN) 500 mcg in sodium chloride 0.9% 100 mL infusion Continuous    ondansetron injection 4 mg  Q8H PRN    pantoprazole injection 40 mg BID    sodium chloride 0.9% flush 10 mL PRN       Objective:     Vital Signs (Most Recent):  Temp: 99.1 °F (37.3 °C) (07/10/20 1045)  Pulse: 87 (07/10/20 1045)  Resp: (!) 35 (07/10/20 1045)  BP: (!) 155/76 (07/10/20 1045)  SpO2: 96 % (07/10/20 1045)  O2 Device (Oxygen Therapy): nasal cannula (07/09/20 2215) Vital Signs (24h Range):  Temp:  [97.1 °F (36.2 °C)-99.1 °F (37.3 °C)] 99.1 °F (37.3 °C)  Pulse:  [] 87  Resp:  [10-40] 35  SpO2:  [88 %-100 %] 96 %  BP: (114-183)/() 155/76     Weight: 83.9 kg (185 lb) (07/09/20 0721)  Body mass index is 28.98 kg/m².  Body surface area is 1.99 meters squared.    I/O last 3 completed shifts:  In: 4163.8 [I.V.:265.8; Blood:3798; IV Piggyback:100]  Out: 620 [Urine:620]    Physical Exam  Vitals signs and nursing note reviewed.   Constitutional:       Appearance: Normal appearance.   HENT:      Head: Normocephalic and atraumatic.   Neck:      Musculoskeletal: Normal range of motion.   Cardiovascular:      Rate and Rhythm: Normal rate and regular rhythm.      Pulses: Normal pulses.      Heart sounds: Normal heart sounds.   Pulmonary:      Effort: Pulmonary effort is normal.      Breath sounds: Normal breath sounds. No rales.   Abdominal:      General: Abdomen is flat.      Palpations: Abdomen is soft.   Musculoskeletal:      Right lower leg: No edema.   Skin:     General: Skin is warm and dry.   Neurological:      General: No focal deficit present.      Mental Status: She is alert and oriented to person, place, and time.         Significant Labs: reviewed  BMP  Lab Results   Component Value Date     07/10/2020     07/10/2020    K 3.9 07/10/2020    K 4.0 07/10/2020     07/10/2020     07/10/2020    CO2 20 (L) 07/10/2020    CO2 20 (L) 07/10/2020    BUN 43 (H) 07/10/2020    BUN 43 (H) 07/10/2020    CREATININE 2.5 (H) 07/10/2020    CREATININE 2.4 (H) 07/10/2020    CALCIUM 9.5 07/10/2020    CALCIUM 9.2 07/10/2020     ANIONGAP 15 07/10/2020    ANIONGAP 14 07/10/2020    ESTGFRAFRICA 26 (A) 07/10/2020    ESTGFRAFRICA 28 (A) 07/10/2020    EGFRNONAA 23 (A) 07/10/2020    EGFRNONAA 24 (A) 07/10/2020     Lab Results   Component Value Date    WBC 8.86 07/10/2020    HGB 6.7 (L) 07/10/2020    HCT 20.6 (L) 07/10/2020    MCV 99 (H) 07/10/2020    PLT 59 (L) 07/10/2020           Significant Imaging: reviewed    Assessment/Plan:   43 y/o female with SUNG on CKD and hepatic decompensation:    Acute renal failure  SUNG on CKD stage 3, baseline s Cr not known however.  s Cr not worse, stable renal function  SUNG likely due to prerenal azotemia: hypotension, anemia, and diarrhea x 1 pre-admit  K normal  Metabolic acidosis  UOP stable    Will send urine Na and Cr to calculate FENa    H/o of hep C noted  U/a shows no proteinuria or hematuria, therefore hep C likely has not affected the kidneys.    Decompensated HCV cirrhosis  Has liver cirrhosis  Due to hep C and alcoholism  Decompensated, presented with GI bleed, likely has varices  Elevated bilirubin  Hypoalbuminemia  Hepatorenal syndrome is not unlikely, but is a diagnosis of exclusion  Will defer to GI      Plans and recommendations:  As reviewed above  Total critical care time spent 30 minutes including time needed to review the records, the   patient evaluation, documentation, face-to-face discussion with the patient,   more than 50% of the time was spent on coordination of care and counseling.        Cole Lunsford MD  Nephrology  [facility]

## 2020-07-10 NOTE — ASSESSMENT & PLAN NOTE
GI bleed with melena.  Hemoglobin 3.8 after 1.5 unit PRBC transfusion.  Keep NPO.  GI consulted.  For possible EGD today.    7/10:  Hgb < 7 this am  Will transfuse 1 unit pRBC  GI consulted on case

## 2020-07-10 NOTE — SUBJECTIVE & OBJECTIVE
Interval History: Pt was seen and examined in ICU. Pt is a 45 y/o female with  Chronic liver disease due to alcoholism and hep C who presented with GI bleed (Hgb 3.8), hypotension, and weakness. Pt has SUNG on CKD. Stage 3  (though baseline s Cr is not known). This am, pt has no c/o's, no new issues. No SOB, no abd pain,.    Review of patient's allergies indicates:  No Known Allergies  Current Facility-Administered Medications   Medication Frequency    0.9%  NaCl infusion (for blood administration) Q24H PRN    cefTRIAXone (ROCEPHIN) 2 g/50 mL D5W IVPB Q24H    lactulose 10 gram/15 mL solution (enema) 200 g Q6H    magnesium sulfate in dextrose IVPB (premix) 1 g Once    morphine injection 2 mg Q4H PRN    mupirocin 2 % ointment BID    nicotine 21 mg/24 hr 1 patch Daily    octreotide (SANDOSTATIN) 500 mcg in sodium chloride 0.9% 100 mL infusion Continuous    ondansetron injection 4 mg Q8H PRN    pantoprazole injection 40 mg BID    sodium chloride 0.9% flush 10 mL PRN       Objective:     Vital Signs (Most Recent):  Temp: 99.1 °F (37.3 °C) (07/10/20 1045)  Pulse: 87 (07/10/20 1045)  Resp: (!) 35 (07/10/20 1045)  BP: (!) 155/76 (07/10/20 1045)  SpO2: 96 % (07/10/20 1045)  O2 Device (Oxygen Therapy): nasal cannula (07/09/20 2215) Vital Signs (24h Range):  Temp:  [97.1 °F (36.2 °C)-99.1 °F (37.3 °C)] 99.1 °F (37.3 °C)  Pulse:  [] 87  Resp:  [10-40] 35  SpO2:  [88 %-100 %] 96 %  BP: (114-183)/() 155/76     Weight: 83.9 kg (185 lb) (07/09/20 0721)  Body mass index is 28.98 kg/m².  Body surface area is 1.99 meters squared.    I/O last 3 completed shifts:  In: 4163.8 [I.V.:265.8; Blood:3798; IV Piggyback:100]  Out: 620 [Urine:620]    Physical Exam  Vitals signs and nursing note reviewed.   Constitutional:       Appearance: Normal appearance.   HENT:      Head: Normocephalic and atraumatic.   Neck:      Musculoskeletal: Normal range of motion.   Cardiovascular:      Rate and Rhythm: Normal rate and regular  rhythm.      Pulses: Normal pulses.      Heart sounds: Normal heart sounds.   Pulmonary:      Effort: Pulmonary effort is normal.      Breath sounds: Normal breath sounds. No rales.   Abdominal:      General: Abdomen is flat.      Palpations: Abdomen is soft.   Musculoskeletal:      Right lower leg: No edema.   Skin:     General: Skin is warm and dry.   Neurological:      General: No focal deficit present.      Mental Status: She is alert and oriented to person, place, and time.         Significant Labs: reviewed  BMP  Lab Results   Component Value Date     07/10/2020     07/10/2020    K 3.9 07/10/2020    K 4.0 07/10/2020     07/10/2020     07/10/2020    CO2 20 (L) 07/10/2020    CO2 20 (L) 07/10/2020    BUN 43 (H) 07/10/2020    BUN 43 (H) 07/10/2020    CREATININE 2.5 (H) 07/10/2020    CREATININE 2.4 (H) 07/10/2020    CALCIUM 9.5 07/10/2020    CALCIUM 9.2 07/10/2020    ANIONGAP 15 07/10/2020    ANIONGAP 14 07/10/2020    ESTGFRAFRICA 26 (A) 07/10/2020    ESTGFRAFRICA 28 (A) 07/10/2020    EGFRNONAA 23 (A) 07/10/2020    EGFRNONAA 24 (A) 07/10/2020     Lab Results   Component Value Date    WBC 8.86 07/10/2020    HGB 6.7 (L) 07/10/2020    HCT 20.6 (L) 07/10/2020    MCV 99 (H) 07/10/2020    PLT 59 (L) 07/10/2020           Significant Imaging: reviewed

## 2020-07-10 NOTE — ASSESSMENT & PLAN NOTE
Transfusing 1 unit PRBC this AM for total of 6 this admit  Will give Cryo given this is 6th unit PRBC  Repeat CBC post transfusion  No active bleeding since admit  Hemodynamically stable  Follow H/H  GI following planning EGD

## 2020-07-10 NOTE — [employer] PATIENT INSTRUCTIONS
Discharge Summary/Instructions after an Endoscopic Procedure  Patient Name: John Wray  Patient MRN: [id number]  Patient YOB: 1975  Friday, July 10, 2020 Edwige Spicer MD  RESTRICTIONS:  During your procedure today, you received medications for sedation.  These   medications may affect your judgment, balance and coordination.  Therefore,   for 24 hours, you have the following restrictions:   - DO NOT drive a car, operate machinery, make legal/financial decisions,   sign important papers or drink alcohol.    ACTIVITY:  Today: no heavy lifting, straining or running due to procedural   sedation/anesthesia.  The following day: return to full activity including work.  DIET:  Eat and drink normally unless instructed otherwise.     TREATMENT FOR COMMON SIDE EFFECTS:  - Mild abdominal pain, nausea, belching, bloating or excessive gas:  rest,   eat lightly and use a heating pad.  - Sore Throat: treat with throat lozenges and/or gargle with warm salt   water.  - Because air was used during the procedure, expelling large amounts of air   from your rectum or belching is normal.  - If a bowel prep was taken, you may not have a bowel movement for 1-3 days.    This is normal.  SYMPTOMS TO WATCH FOR AND REPORT TO YOUR PHYSICIAN:  1. Abdominal pain or bloating, other than gas cramps.  2. Chest pain.  3. Back pain.  4. Signs of infection such as: chills or fever occurring within 24 hours   after the procedure.  5. Rectal bleeding, which would show as bright red, maroon, or black stools.   (A tablespoon of blood from the rectum is not serious, especially if   hemorrhoids are present.)  6. Vomiting.  7. Weakness or dizziness.  GO DIRECTLY TO THE NEAREST EMERGENCY ROOM IF YOU HAVE ANY OF THE FOLLOWING:      Difficulty breathing              Chills and/or fever over 101 F   Persistent vomiting and/or vomiting blood   Severe abdominal pain   Severe chest pain   Black, tarry stools   Bleeding- more than one tablespoon   Any  other symptom or condition that you feel may need urgent attention  Your doctor recommends these additional instructions:  If any biopsies were taken, your doctors clinic will contact you in 1 to 2   weeks with any results.  - Return patient to ICU for ongoing care.   - NPO.   - Continue present medications. Continue octreotide for 72hrs total,   continue bid PPI.  - Close monitoring of Hb, platelets, fibrinogen, INR  - Repeat upper endoscopy in 4 weeks for retreatment.  For questions, problems or results please call your physician Edwige Spicer MD at Work:  [phone]  If you have any questions about the above instructions, call the GI   department at [phone] or call the endoscopy unit at [phone]   from 7am until 3 pm.  [facility] [place], EMERGENCY ROOM PHONE NUMBER:   [phone]  IF A COMPLICATION OR EMERGENCY SITUATION ARISES AND YOU ARE UNABLE TO REACH   YOUR PHYSICIAN - GO DIRECTLY TO THE EMERGENCY ROOM.  I have read or have had read to me these discharge instructions for my   procedure and have received a written copy.  I understand these   instructions and will follow-up with my physician if I have any questions.     __________________________________       _____________________________________  Nurse Signature                                          Patient/Designated   Responsible Party Signature  Edwige Spicer MD  7/10/2020 2:46:22 PM  This report has been verified and signed electronically.  [employer]

## 2020-07-10 NOTE — OR NURSING
Pt. Adequately sedated and intubated successfully by CRNA with Respiratory assistance.  Final time-out done and agreed with all staff.  See ANESTHESIA  For all medications and vital signs.

## 2020-07-10 NOTE — PROGRESS NOTES
[facility]  Gastroenterology  Progress Note    Patient Name: John Wray  MRN: [id number]  Admission Date: 7/8/2020  Hospital Length of Stay: 1 days  Code Status: Full Code   Attending Provider: Holger Flower MD  Consulting Provider: Duy Rosales PA-C  Primary Care Physician: Provider Notinsystem  Principal Problem: Gastrointestinal hemorrhage with melena      Subjective:     Interval History:   Yesterday, the patient didn't allow for lactulose enema so oral was given. Overnight she became more agitated and was given Ativan. She is starting to wake up but confused. Ammonia up around 150 this morning. Nurse just now gave a lactulose enema.   Hgb stable. No overt bleeding per nurse but rectal tube has been placed. She remains on octreotide, ppi and rocephin. She has gotten a total of 7 units of FFP, one of PLT and three of PRBC.    Dx paracentesis couldn't be done yesterday because there wasn't enough ascites.   Creatinine increasing.     Review of Systems   Unable to perform ROS: Mental status change     Objective:     Vital Signs (Most Recent):  Temp: 99.1 °F (37.3 °C) (07/10/20 0817)  Pulse: 93 (07/10/20 0800)  Resp: (!) 24 (07/10/20 0817)  BP: (!) 179/84 (07/10/20 0817)  SpO2: 97 %(3L nc) (07/10/20 0817) Vital Signs (24h Range):  Temp:  [97.1 °F (36.2 °C)-99.1 °F (37.3 °C)] 99.1 °F (37.3 °C)  Pulse:  [] 93  Resp:  [13-40] 24  SpO2:  [88 %-100 %] 97 %  BP: (108-183)/() 179/84     Weight: 83.9 kg (185 lb) (07/09/20 0721)  Body mass index is 28.98 kg/m².      Intake/Output Summary (Last 24 hours) at 7/10/2020 1046  Last data filed at 7/10/2020 0950  Gross per 24 hour   Intake 3189.41 ml   Output 365 ml   Net 2824.41 ml       Lines/Drains/Airways     Drain                 Urethral Catheter 07/09/20 0130 Latex 16 Fr. 1 day         Rectal Tube 07/10/20 0950 rectal tube w/ balloon (indicate number of mLs) less than 1 day          Peripheral Intravenous Line                 Peripheral IV -  Single Lumen 07/08/20 1849 20 G Right Antecubital 1 day         Peripheral IV - Single Lumen 07/09/20 0055 18 G Left Antecubital 1 day                Physical Exam  Constitutional:       General: She is not in acute distress.     Appearance: She is well-developed. She is not diaphoretic.   HENT:      Head: Normocephalic and atraumatic.   Cardiovascular:      Rate and Rhythm: Normal rate and regular rhythm.   Pulmonary:      Effort: Pulmonary effort is normal. No respiratory distress.      Breath sounds: Wheezing (bilaterally) present.   Abdominal:      General: Bowel sounds are normal. There is no distension.      Palpations: Abdomen is soft.      Tenderness: There is no abdominal tenderness.   Neurological:      General: No focal deficit present.      Comments: Patient waking up but remains confused and agitated.          Significant Labs:  CBC:   Recent Labs   Lab 07/09/20  0408 07/09/20  1457 07/09/20  1823 07/10/20  0002 07/10/20  0359   WBC 14.63*  --   --  9.74 8.86   HGB 3.8*  --  6.6* 6.9* 6.7*   HCT 12.9* 18.3*  --  20.9* 20.6*   PLT 49*  --   --  49* 59*     CMP:   Recent Labs   Lab 07/10/20  0359   *  114*   CALCIUM 9.2  9.5   ALBUMIN 2.8*   PROT 9.2*     137   K 4.0  3.9   CO2 20*  20*     102   BUN 43*  43*   CREATININE 2.4*  2.5*   ALKPHOS 122   ALT 44   *   BILITOT 19.8*         Significant Imaging:  Imaging results within the past 24 hours have been reviewed.    Assessment/Plan:     * Gastrointestinal hemorrhage with melena  Due to tolerance of severely low Hgb and relatively stable vitals, acute or active GI bleed unlikely. However, we can't rule out chronic bleeding. Will plan on EGD once patient is more stable (Hgb above 7, INR around 1.5) Her older daughter would need to be contacted for consent.   Continue Octreotide ggt, Protonix IV and Rocephin.    Continue to monitor H/H closely and transfuse as indicated.     Decompensated HCV cirrhosis  Based on review of  labs from Care Everywhere, the patient has chronically decompensated liver cirrhosis due to alcohol abuse with h/o varices and ascites.   Acute presentation could be secondary GI bleed; however, WBC count also elevated so need to rule out infection. Blood cultures with no growth to date. Dx paracentesis not done due to low volume of ascites. In the differential would also include acute alcoholic hepatitis (DF 40.2).   US showed hepatosplenomegaly with fatty infiltration of the liver, trace ascites, pulsatile flow in the main portal vein compatible with portal hypertension.   Viral hepatitis serologies pending.   Patient confused; likely multifactorial. She just got a Lactulose enema. I will order another in two hours, then continue as scheduled.  Monitor INR and CMP daily. - Waiting on INR today.     MELD-Na score: 32 at 7/10/2020  3:59 AM  MELD score: 32 at 7/10/2020  3:59 AM  Calculated from:  Serum Creatinine: 2.5 mg/dL at 7/10/2020  3:59 AM  Serum Sodium: 137 mmol/L at 7/10/2020  3:59 AM  Total Bilirubin: 19.8 mg/dL at 7/10/2020  3:59 AM  INR(ratio): 1.6 at 7/10/2020  3:59 AM  Age: 44 years 6 months        Encephalopathy, metabolic  See plan above.     Acute renal failure  Likely prerenal. Nephrology following.     Coagulopathy  Likely related to liver cirrhosis. Continue to monitor.         Thank you for your consult. I will follow-up with patient. Please contact us if you have any additional questions.    Duy Rosales PA-C  Gastroenterology  [facility]

## 2020-07-10 NOTE — ANESTHESIA PREPROCEDURE EVALUATION
07/10/2020  John Wray is a 44 y.o., female.    Anesthesia Evaluation    I have reviewed the Patient Summary Reports.    I have reviewed the Nursing Notes.       Review of Systems  Anesthesia Hx:  No problems with previous Anesthesia    Cardiovascular:   Exercise tolerance: poor Hypertension    Pulmonary:   Shortness of breath    Renal/:   Chronic Renal Disease    Hepatic/GI:   Liver Disease, Hepatitis, C    Psych:   Psychiatric History          Physical Exam  General:  Well nourished    Airway/Jaw/Neck:  Airway Findings: Mouth Opening: Normal Tongue: Normal  General Airway Assessment: Adult  Mallampati: II  TM Distance: 4 - 6 cm  Jaw/Neck Findings:  Neck ROM: Normal ROM      Dental:  Dental Findings: In tact   Chest/Lungs:  Chest/Lungs Findings: Clear to auscultation, Normal Respiratory Rate     Heart/Vascular:  Heart Findings: Rate: Normal  Rhythm: Regular Rhythm  Sounds: Normal        Mental Status:  Mental Status Findings:  Cooperative, Alert and Oriented         Anesthesia Plan  Type of Anesthesia, risks & benefits discussed:  Anesthesia Type:  general  Patient's Preference:   Intra-op Monitoring Plan:   Intra-op Monitoring Plan Comments:   Post Op Pain Control Plan: multimodal analgesia  Post Op Pain Control Plan Comments:   Induction:   IV  Beta Blocker:         Informed Consent: Patient understands risks and agrees with Anesthesia plan.  Questions answered. Anesthesia consent signed with patient.  ASA Score: 5  emergent   Day of Surgery Review of History & Physical: I have interviewed and examined the patient. I have reviewed the patient's H&P dated:  There are no significant changes.  H&P update referred to the surgeon.  H&P completed by Anesthesiologist.       Ready For Surgery From Anesthesia Perspective.

## 2020-07-10 NOTE — ANESTHESIA POSTPROCEDURE EVALUATION
Anesthesia Post Evaluation    Patient: John Wray    Procedure(s) Performed: Procedure(s) (LRB):  EGD (ESOPHAGOGASTRODUODENOSCOPY) (N/A)    Final Anesthesia Type: general    Patient location during evaluation: PACU  Patient participation: Yes- Able to Participate  Level of consciousness: awake and alert  Post-procedure vital signs: reviewed and stable  Pain management: adequate  Airway patency: patent  BLANE mitigation strategies: Extubation while patient is awake  PONV status at discharge: No PONV  Anesthetic complications: no      Cardiovascular status: hemodynamically stable  Respiratory status: spontaneous ventilation  Hydration status: euvolemic  Follow-up not needed.          Vitals Value Taken Time   /84 07/10/20 1437   Temp 98 07/10/20 1437   Pulse 84 07/10/20 1437   Resp 16 07/10/20 1437   SpO2 96 % 07/10/20 1437   Vitals shown include unvalidated device data.      No case tracking events are documented in the log.      Pain/Brina Score: Pain Rating Prior to Med Admin: 8 (7/9/2020  1:44 PM)

## 2020-07-10 NOTE — ASSESSMENT & PLAN NOTE
Secondary to cirrhosis of the liver, elevated INR and APTT on admission, negative for report of taking an anticoagulant.  Given 2 units FFP and 10 mg vitamin K overnight on 7/9/2020, given an additional 4 units of FFP on 07/10.  INR pending.  Goal is to have hemoglobin greater than 7 prior to EGD per GI.    --daily CBC to monitor for bleeding

## 2020-07-10 NOTE — ASSESSMENT & PLAN NOTE
7/10:  Ammonia level continue to be elevated  Patient refusing oral lactulose  Will continue lactulose rectally

## 2020-07-10 NOTE — PLAN OF CARE
Pt increasingly confused and agitated. Became uncooperative and combative midshift.  Family aware and vu if restraints are needed.  Pt hallucinatory visually and auditory. Pt would not participate for lactulose enemas.  GI aware vo to switch to all oral.  Pt received 4 units of RBC 2 FFP and 1 Platelets.  2 Vit K. 80 of Lasix with below minimal output.  MD aware.  4g of Mag to replace a 1.1. Both daughters, niece Pepper, and sister all updated and all aware of visitor policy. Pt is a daily drinker according to family and pack a day smoker.

## 2020-07-10 NOTE — ASSESSMENT & PLAN NOTE
Likely multifactorial: Anemia, Met acidosis, elevated ammonia and SUNG  Transfuse again today  Cont Lactulose  Much improved - now off Bicarb infusion  Follow up labs  Renal following  NH3 pending

## 2020-07-10 NOTE — EICU
Hgb 6.9, no active bleeding, /60, sats 96% on 3 l n/c.   Will check 3.30 am cbc to decide on the need for additional blood transfusion.  D/w RN

## 2020-07-10 NOTE — PROGRESS NOTES
Dr. Plasencia notified of hemoglobin and hematocrit results from 0002 which were 6.9 and 20.9. Awaiting new orders at this time. Patient's vital signs stable a this time. No signs of bleeding. No distress noted. Will continue to monitor.

## 2020-07-10 NOTE — SUBJECTIVE & OBJECTIVE
Interval History:  Patient given 4 units of FFP overnight, INR results pending.  Hemoglobin 6.7 today blood transfusion in progress at time of visit.  Patient more confused and combative today, given Ativan overnight.  Placed in wrist restraints for safety.    Oncology Treatment Plan:   [No treatment plan]    Medications:  Continuous Infusions:   octreotide (SANDOSTATIN) infusion 50 mcg/hr (07/10/20 1050)     Scheduled Meds:   cefTRIAXone (ROCEPHIN) IVPB  2 g Intravenous Q24H    lactulose  200 g Rectal Q6H    magnesium sulfate IVPB  1 g Intravenous Once    mupirocin   Nasal BID    nicotine  1 patch Transdermal Daily    pantoprazole  40 mg Intravenous BID     PRN Meds:sodium chloride, morphine, ondansetron, sodium chloride 0.9%     Review of Systems   Unable to perform ROS: Mental status change     Objective:     Vital Signs (Most Recent):  Temp: 99.1 °F (37.3 °C) (07/10/20 1045)  Pulse: 87 (07/10/20 1045)  Resp: (!) 35 (07/10/20 1045)  BP: (!) 155/76 (07/10/20 1045)  SpO2: 96 % (07/10/20 1045) Vital Signs (24h Range):  Temp:  [97.1 °F (36.2 °C)-99.1 °F (37.3 °C)] 99.1 °F (37.3 °C)  Pulse:  [] 87  Resp:  [10-40] 35  SpO2:  [88 %-100 %] 96 %  BP: (114-183)/() 155/76     Weight: 83.9 kg (185 lb)  Body mass index is 28.98 kg/m².  Body surface area is 1.99 meters squared.      Intake/Output Summary (Last 24 hours) at 7/10/2020 1104  Last data filed at 7/10/2020 1045  Gross per 24 hour   Intake 3559.41 ml   Output 365 ml   Net 3194.41 ml       Physical Exam  Vitals signs and nursing note reviewed. Exam conducted with a chaperone present (daughter).   Constitutional:       General: She is not in acute distress.     Appearance: She is well-developed. She is ill-appearing.   HENT:      Head: Normocephalic and atraumatic.      Right Ear: Hearing and external ear normal.      Left Ear: Hearing and external ear normal.      Nose: No rhinorrhea.      Right Sinus: No maxillary sinus tenderness or frontal sinus  tenderness.      Left Sinus: No maxillary sinus tenderness or frontal sinus tenderness.      Mouth/Throat:      Mouth: No oral lesions.      Pharynx: Uvula midline.      Comments: Dried blood around mouth, no clear source observed, likely bleeding from gums.  Eyes:      General:         Right eye: No discharge.         Left eye: No discharge.      Conjunctiva/sclera: Conjunctivae normal.      Pupils: Pupils are equal, round, and reactive to light.   Neck:      Musculoskeletal: Normal range of motion.      Thyroid: No thyromegaly.      Vascular: No carotid bruit.      Trachea: No tracheal deviation.   Cardiovascular:      Rate and Rhythm: Normal rate and regular rhythm.      Pulses:           Dorsalis pedis pulses are 2+ on the right side and 2+ on the left side.      Heart sounds: Normal heart sounds, S1 normal and S2 normal. No murmur.   Pulmonary:      Effort: Pulmonary effort is normal. No respiratory distress.      Breath sounds: Normal breath sounds. Examination of the right-middle field reveals rales. Examination of the left-middle field reveals rales. Examination of the right-lower field reveals decreased breath sounds. Examination of the left-lower field reveals decreased breath sounds.   Abdominal:      General: Bowel sounds are normal. There is no distension.      Palpations: Abdomen is soft. There is fluid wave. There is no mass.      Tenderness: There is no abdominal tenderness.   Musculoskeletal: Normal range of motion.         General: Swelling (Generalized) present.   Lymphadenopathy:      Cervical: No cervical adenopathy.      Upper Body:      Right upper body: No supraclavicular adenopathy.      Left upper body: No supraclavicular adenopathy.   Skin:     General: Skin is warm and dry.      Capillary Refill: Capillary refill takes less than 2 seconds.      Findings: No rash.   Neurological:      Mental Status: She is alert and oriented to person, place, and time. She is confused.      Sensory: No  sensory deficit.      Coordination: Coordination normal.      Gait: Gait normal.   Psychiatric:         Mood and Affect: Mood is not depressed.         Behavior: Behavior is agitated.         Cognition and Memory: Cognition is impaired. Memory is impaired.      Comments: Patient more confused and combative today.  Placed in wrist restraints for safety.         Significant Labs:   CBC:   Recent Labs   Lab 07/09/20  0408 07/09/20  1457 07/09/20  1823 07/10/20  0002 07/10/20  0359   WBC 14.63*  --   --  9.74 8.86   HGB 3.8*  --  6.6* 6.9* 6.7*   HCT 12.9* 18.3*  --  20.9* 20.6*   PLT 49*  --   --  49* 59*    and CMP:   Recent Labs   Lab 07/08/20  1852 07/09/20  0408 07/09/20  1823 07/10/20  0002 07/10/20  0359   * 136 136 137 137  137   K 4.0 4.5 4.1 4.0 4.0  3.9    103 103 102 103  102   CO2 8* 10* 19* 20* 20*  20*   GLU 84 93 96 117* 113*  114*   BUN 22* 25* 36* 42* 43*  43*   CREATININE 1.6* 2.1* 2.2* 2.4* 2.4*  2.5*   CALCIUM 9.2 9.0 9.3 9.4 9.2  9.5   PROT 9.2* 9.2*  --   --  9.2*   ALBUMIN 2.2* 2.4*  --   --  2.8*   BILITOT 14.9* 14.9*  --   --  19.8*   ALKPHOS 154* 135  --   --  122   * 226*  --   --  195*   ALT 44 42  --   --  44   ANIONGAP 23* 23* 14 15 14  15   EGFRNONAA 39* 28* 27* 24* 24*  23*       Diagnostic Results:  I have reviewed all pertinent imaging results/findings within the past 24 hours.

## 2020-07-10 NOTE — ASSESSMENT & PLAN NOTE
Based on review of labs from Care Everywhere, the patient has chronically decompensated liver cirrhosis due to alcohol abuse with h/o varices and ascites.   Acute presentation could be secondary GI bleed; however, WBC count also elevated so need to rule out infection. Blood cultures with no growth to date. Dx paracentesis not done due to low volume of ascites. In the differential would also include acute alcoholic hepatitis (DF 40.2).   US showed hepatosplenomegaly with fatty infiltration of the liver, trace ascites, pulsatile flow in the main portal vein compatible with portal hypertension.   Viral hepatitis serologies pending.   Patient confused; likely multifactorial. She just got a Lactulose enema. I will order another in two hours, then continue as scheduled.  Monitor INR and CMP daily. - Waiting on INR today.     MELD-Na score: 32 at 7/10/2020  3:59 AM  MELD score: 32 at 7/10/2020  3:59 AM  Calculated from:  Serum Creatinine: 2.5 mg/dL at 7/10/2020  3:59 AM  Serum Sodium: 137 mmol/L at 7/10/2020  3:59 AM  Total Bilirubin: 19.8 mg/dL at 7/10/2020  3:59 AM  INR(ratio): 1.6 at 7/10/2020  3:59 AM  Age: 44 years 6 months

## 2020-07-10 NOTE — PLAN OF CARE
Per IRENA Gorman Dr. to evaluate for EGD today and we will determine from there whether or not family conference is warranted for Palliative care referral. Updated RAFAELA Lainez that CM is available to facilitate.

## 2020-07-10 NOTE — SUBJECTIVE & OBJECTIVE
Subjective:     Interval History:   Yesterday, the patient didn't allow for lactulose enema so oral was given. Overnight she became more agitated and was given Ativan. She is starting to wake up but confused. Ammonia up around 150 this morning. Nurse just now gave a lactulose enema.   Hgb stable. No overt bleeding per nurse but rectal tube has been placed. She remains on octreotide, ppi and rocephin. She has gotten a total of 7 units of FFP, one of PLT and three of PRBC.    Dx paracentesis couldn't be done yesterday because there wasn't enough ascites.   Creatinine increasing.     Review of Systems   Unable to perform ROS: Mental status change     Objective:     Vital Signs (Most Recent):  Temp: 99.1 °F (37.3 °C) (07/10/20 0817)  Pulse: 93 (07/10/20 0800)  Resp: (!) 24 (07/10/20 0817)  BP: (!) 179/84 (07/10/20 0817)  SpO2: 97 %(3L nc) (07/10/20 0817) Vital Signs (24h Range):  Temp:  [97.1 °F (36.2 °C)-99.1 °F (37.3 °C)] 99.1 °F (37.3 °C)  Pulse:  [] 93  Resp:  [13-40] 24  SpO2:  [88 %-100 %] 97 %  BP: (108-183)/() 179/84     Weight: 83.9 kg (185 lb) (07/09/20 0721)  Body mass index is 28.98 kg/m².      Intake/Output Summary (Last 24 hours) at 7/10/2020 1046  Last data filed at 7/10/2020 0950  Gross per 24 hour   Intake 3189.41 ml   Output 365 ml   Net 2824.41 ml       Lines/Drains/Airways     Drain                 Urethral Catheter 07/09/20 0130 Latex 16 Fr. 1 day         Rectal Tube 07/10/20 0950 rectal tube w/ balloon (indicate number of mLs) less than 1 day          Peripheral Intravenous Line                 Peripheral IV - Single Lumen 07/08/20 1849 20 G Right Antecubital 1 day         Peripheral IV - Single Lumen 07/09/20 0055 18 G Left Antecubital 1 day                Physical Exam  Constitutional:       General: She is not in acute distress.     Appearance: She is well-developed. She is not diaphoretic.   HENT:      Head: Normocephalic and atraumatic.   Cardiovascular:      Rate and Rhythm:  Normal rate and regular rhythm.   Pulmonary:      Effort: Pulmonary effort is normal. No respiratory distress.      Breath sounds: Wheezing (bilaterally) present.   Abdominal:      General: Bowel sounds are normal. There is no distension.      Palpations: Abdomen is soft.      Tenderness: There is no abdominal tenderness.   Neurological:      General: No focal deficit present.      Comments: Patient waking up but remains confused and agitated.          Significant Labs:  CBC:   Recent Labs   Lab 07/09/20  0408 07/09/20  1457 07/09/20  1823 07/10/20  0002 07/10/20  0359   WBC 14.63*  --   --  9.74 8.86   HGB 3.8*  --  6.6* 6.9* 6.7*   HCT 12.9* 18.3*  --  20.9* 20.6*   PLT 49*  --   --  49* 59*     CMP:   Recent Labs   Lab 07/10/20  0359   *  114*   CALCIUM 9.2  9.5   ALBUMIN 2.8*   PROT 9.2*     137   K 4.0  3.9   CO2 20*  20*     102   BUN 43*  43*   CREATININE 2.4*  2.5*   ALKPHOS 122   ALT 44   *   BILITOT 19.8*         Significant Imaging:  Imaging results within the past 24 hours have been reviewed.

## 2020-07-11 PROBLEM — E83.42 HYPOMAGNESEMIA: Status: RESOLVED | Noted: 2020-07-09 | Resolved: 2020-07-11

## 2020-07-11 PROBLEM — I85.11 SECONDARY ESOPHAGEAL VARICES WITH BLEEDING: Status: ACTIVE | Noted: 2020-07-09

## 2020-07-11 PROBLEM — J96.01 ACUTE RESPIRATORY FAILURE WITH HYPOXIA: Status: ACTIVE | Noted: 2020-07-10

## 2020-07-11 PROBLEM — K76.82 HEPATIC ENCEPHALOPATHY: Status: ACTIVE | Noted: 2020-07-09

## 2020-07-11 PROBLEM — E87.29 METABOLIC ACIDOSIS, INCREASED ANION GAP: Status: RESOLVED | Noted: 2020-07-09 | Resolved: 2020-07-11

## 2020-07-11 LAB
ALBUMIN SERPL BCP-MCNC: 2.4 G/DL (ref 3.5–5.2)
ALLENS TEST: ABNORMAL
ALP SERPL-CCNC: 113 U/L (ref 55–135)
ALT SERPL W/O P-5'-P-CCNC: 40 U/L (ref 10–44)
AMMONIA PLAS-SCNC: 84 UMOL/L (ref 10–50)
ANION GAP SERPL CALC-SCNC: 13 MMOL/L (ref 8–16)
ANION GAP SERPL CALC-SCNC: 17 MMOL/L (ref 8–16)
AST SERPL-CCNC: 144 U/L (ref 10–40)
BASOPHILS # BLD AUTO: 0.02 K/UL (ref 0–0.2)
BASOPHILS NFR BLD: 0.3 % (ref 0–1.9)
BILIRUB SERPL-MCNC: 18.3 MG/DL (ref 0.1–1)
BUN SERPL-MCNC: 52 MG/DL (ref 6–20)
BUN SERPL-MCNC: 54 MG/DL (ref 6–20)
BUN SERPL-MCNC: 55 MG/DL (ref 6–20)
BUN SERPL-MCNC: 55 MG/DL (ref 6–20)
CALCIUM SERPL-MCNC: 9 MG/DL (ref 8.7–10.5)
CALCIUM SERPL-MCNC: 9 MG/DL (ref 8.7–10.5)
CALCIUM SERPL-MCNC: 9.1 MG/DL (ref 8.7–10.5)
CALCIUM SERPL-MCNC: 9.2 MG/DL (ref 8.7–10.5)
CHLORIDE SERPL-SCNC: 104 MMOL/L (ref 95–110)
CHLORIDE SERPL-SCNC: 105 MMOL/L (ref 95–110)
CHLORIDE SERPL-SCNC: 105 MMOL/L (ref 95–110)
CHLORIDE SERPL-SCNC: 106 MMOL/L (ref 95–110)
CO2 SERPL-SCNC: 20 MMOL/L (ref 23–29)
CO2 SERPL-SCNC: 21 MMOL/L (ref 23–29)
CREAT SERPL-MCNC: 2 MG/DL (ref 0.5–1.4)
CREAT SERPL-MCNC: 2.2 MG/DL (ref 0.5–1.4)
DELSYS: ABNORMAL
DIFFERENTIAL METHOD: ABNORMAL
EOSINOPHIL # BLD AUTO: 0.2 K/UL (ref 0–0.5)
EOSINOPHIL NFR BLD: 2.6 % (ref 0–8)
ERYTHROCYTE [DISTWIDTH] IN BLOOD BY AUTOMATED COUNT: 22.8 % (ref 11.5–14.5)
ERYTHROCYTE [SEDIMENTATION RATE] IN BLOOD BY WESTERGREN METHOD: 22 MM/H
EST. GFR  (AFRICAN AMERICAN): 31 ML/MIN/1.73 M^2
EST. GFR  (AFRICAN AMERICAN): 34 ML/MIN/1.73 M^2
EST. GFR  (NON AFRICAN AMERICAN): 27 ML/MIN/1.73 M^2
EST. GFR  (NON AFRICAN AMERICAN): 30 ML/MIN/1.73 M^2
FIO2: 55
GLUCOSE SERPL-MCNC: 105 MG/DL (ref 70–110)
GLUCOSE SERPL-MCNC: 90 MG/DL (ref 70–110)
GLUCOSE SERPL-MCNC: 93 MG/DL (ref 70–110)
GLUCOSE SERPL-MCNC: 93 MG/DL (ref 70–110)
HCO3 UR-SCNC: 20.9 MMOL/L (ref 24–28)
HCT VFR BLD AUTO: 24.4 % (ref 37–48.5)
HGB BLD-MCNC: 8.3 G/DL (ref 12–16)
HGB BLD-MCNC: 8.3 G/DL (ref 12–16)
HGB BLD-MCNC: 8.5 G/DL (ref 12–16)
HGB BLD-MCNC: 8.5 G/DL (ref 12–16)
IMM GRANULOCYTES # BLD AUTO: 0.1 K/UL (ref 0–0.04)
IMM GRANULOCYTES NFR BLD AUTO: 1.3 % (ref 0–0.5)
INR PPP: 1.7 (ref 0.8–1.2)
LYMPHOCYTES # BLD AUTO: 0.5 K/UL (ref 1–4.8)
LYMPHOCYTES NFR BLD: 6.8 % (ref 18–48)
MAGNESIUM SERPL-MCNC: 1.8 MG/DL (ref 1.6–2.6)
MAGNESIUM SERPL-MCNC: 2 MG/DL (ref 1.6–2.6)
MAGNESIUM SERPL-MCNC: 2 MG/DL (ref 1.6–2.6)
MCH RBC QN AUTO: 31.9 PG (ref 27–31)
MCHC RBC AUTO-ENTMCNC: 34 G/DL (ref 32–36)
MCV RBC AUTO: 94 FL (ref 82–98)
MODE: ABNORMAL
MONOCYTES # BLD AUTO: 0.8 K/UL (ref 0.3–1)
MONOCYTES NFR BLD: 11.1 % (ref 4–15)
NEUTROPHILS # BLD AUTO: 5.9 K/UL (ref 1.8–7.7)
NEUTROPHILS NFR BLD: 77.9 % (ref 38–73)
NRBC BLD-RTO: 5 /100 WBC
PCO2 BLDA: 33.7 MMHG (ref 35–45)
PEEP: 5
PH SMN: 7.4 [PH] (ref 7.35–7.45)
PLATELET # BLD AUTO: 47 K/UL (ref 150–350)
PMV BLD AUTO: 10.9 FL (ref 9.2–12.9)
PO2 BLDA: 87 MMHG (ref 80–100)
POC BE: -4 MMOL/L
POC SATURATED O2: 97 % (ref 95–100)
POTASSIUM SERPL-SCNC: 3.2 MMOL/L (ref 3.5–5.1)
POTASSIUM SERPL-SCNC: 3.3 MMOL/L (ref 3.5–5.1)
POTASSIUM SERPL-SCNC: 3.4 MMOL/L (ref 3.5–5.1)
POTASSIUM SERPL-SCNC: 3.4 MMOL/L (ref 3.5–5.1)
PROT SERPL-MCNC: 8.4 G/DL (ref 6–8.4)
PROTHROMBIN TIME: 17.2 SEC (ref 9–12.5)
RBC # BLD AUTO: 2.6 M/UL (ref 4–5.4)
SAMPLE: ABNORMAL
SITE: ABNORMAL
SODIUM SERPL-SCNC: 138 MMOL/L (ref 136–145)
SODIUM SERPL-SCNC: 139 MMOL/L (ref 136–145)
SODIUM SERPL-SCNC: 139 MMOL/L (ref 136–145)
SODIUM SERPL-SCNC: 143 MMOL/L (ref 136–145)
VT: 400
WBC # BLD AUTO: 7.55 K/UL (ref 3.9–12.7)

## 2020-07-11 PROCEDURE — 25000003 PHARM REV CODE 250: Performed by: INTERNAL MEDICINE

## 2020-07-11 PROCEDURE — 25000003 PHARM REV CODE 250: Performed by: PHYSICIAN ASSISTANT

## 2020-07-11 PROCEDURE — 80048 BASIC METABOLIC PNL TOTAL CA: CPT | Mod: 91

## 2020-07-11 PROCEDURE — 85025 COMPLETE CBC W/AUTO DIFF WBC: CPT

## 2020-07-11 PROCEDURE — 63600175 PHARM REV CODE 636 W HCPCS: Performed by: NURSE PRACTITIONER

## 2020-07-11 PROCEDURE — 85610 PROTHROMBIN TIME: CPT

## 2020-07-11 PROCEDURE — 99232 SBSQ HOSP IP/OBS MODERATE 35: CPT | Mod: ,,, | Performed by: INTERNAL MEDICINE

## 2020-07-11 PROCEDURE — 83735 ASSAY OF MAGNESIUM: CPT | Mod: 91

## 2020-07-11 PROCEDURE — 99900035 HC TECH TIME PER 15 MIN (STAT)

## 2020-07-11 PROCEDURE — 85018 HEMOGLOBIN: CPT | Mod: 91

## 2020-07-11 PROCEDURE — 99232 PR SUBSEQUENT HOSPITAL CARE,LEVL II: ICD-10-PCS | Mod: ,,, | Performed by: INTERNAL MEDICINE

## 2020-07-11 PROCEDURE — 99291 CRITICAL CARE FIRST HOUR: CPT | Mod: ,,, | Performed by: INTERNAL MEDICINE

## 2020-07-11 PROCEDURE — 36600 WITHDRAWAL OF ARTERIAL BLOOD: CPT

## 2020-07-11 PROCEDURE — 94003 VENT MGMT INPAT SUBQ DAY: CPT

## 2020-07-11 PROCEDURE — 25000003 PHARM REV CODE 250: Performed by: NURSE PRACTITIONER

## 2020-07-11 PROCEDURE — 63600175 PHARM REV CODE 636 W HCPCS: Performed by: PHYSICIAN ASSISTANT

## 2020-07-11 PROCEDURE — 99291 PR CRITICAL CARE, E/M 30-74 MINUTES: ICD-10-PCS | Mod: ,,, | Performed by: INTERNAL MEDICINE

## 2020-07-11 PROCEDURE — 83735 ASSAY OF MAGNESIUM: CPT

## 2020-07-11 PROCEDURE — 82140 ASSAY OF AMMONIA: CPT

## 2020-07-11 PROCEDURE — 36415 COLL VENOUS BLD VENIPUNCTURE: CPT

## 2020-07-11 PROCEDURE — 80048 BASIC METABOLIC PNL TOTAL CA: CPT

## 2020-07-11 PROCEDURE — 63600175 PHARM REV CODE 636 W HCPCS: Performed by: FAMILY MEDICINE

## 2020-07-11 PROCEDURE — 27000221 HC OXYGEN, UP TO 24 HOURS

## 2020-07-11 PROCEDURE — 99291 PR CRITICAL CARE, E/M 30-74 MINUTES: ICD-10-PCS | Mod: ,,, | Performed by: NURSE PRACTITIONER

## 2020-07-11 PROCEDURE — 85018 HEMOGLOBIN: CPT

## 2020-07-11 PROCEDURE — 20000000 HC ICU ROOM

## 2020-07-11 PROCEDURE — C9113 INJ PANTOPRAZOLE SODIUM, VIA: HCPCS | Performed by: FAMILY MEDICINE

## 2020-07-11 PROCEDURE — 99291 CRITICAL CARE FIRST HOUR: CPT | Mod: ,,, | Performed by: NURSE PRACTITIONER

## 2020-07-11 PROCEDURE — 25000003 PHARM REV CODE 250: Performed by: FAMILY MEDICINE

## 2020-07-11 PROCEDURE — 99233 SBSQ HOSP IP/OBS HIGH 50: CPT | Mod: ,,, | Performed by: INTERNAL MEDICINE

## 2020-07-11 PROCEDURE — 99233 PR SUBSEQUENT HOSPITAL CARE,LEVL III: ICD-10-PCS | Mod: ,,, | Performed by: INTERNAL MEDICINE

## 2020-07-11 PROCEDURE — 36430 TRANSFUSION BLD/BLD COMPNT: CPT

## 2020-07-11 PROCEDURE — 82803 BLOOD GASES ANY COMBINATION: CPT

## 2020-07-11 PROCEDURE — S4991 NICOTINE PATCH NONLEGEND: HCPCS | Performed by: FAMILY MEDICINE

## 2020-07-11 PROCEDURE — 80053 COMPREHEN METABOLIC PANEL: CPT

## 2020-07-11 RX ORDER — THIAMINE HCL 100 MG
100 TABLET ORAL DAILY
Status: DISCONTINUED | OUTPATIENT
Start: 2020-07-11 | End: 2020-07-13 | Stop reason: HOSPADM

## 2020-07-11 RX ORDER — FOLIC ACID 1 MG/1
1 TABLET ORAL DAILY
Status: DISCONTINUED | OUTPATIENT
Start: 2020-07-11 | End: 2020-07-13 | Stop reason: HOSPADM

## 2020-07-11 RX ORDER — POTASSIUM CHLORIDE 750 MG/1
30 TABLET, EXTENDED RELEASE ORAL EVERY 4 HOURS
Status: DISPENSED | OUTPATIENT
Start: 2020-07-11 | End: 2020-07-11

## 2020-07-11 RX ORDER — FUROSEMIDE 10 MG/ML
40 INJECTION INTRAMUSCULAR; INTRAVENOUS ONCE
Status: COMPLETED | OUTPATIENT
Start: 2020-07-11 | End: 2020-07-11

## 2020-07-11 RX ORDER — POTASSIUM CHLORIDE 750 MG/1
30 TABLET, EXTENDED RELEASE ORAL EVERY 4 HOURS
Status: DISCONTINUED | OUTPATIENT
Start: 2020-07-11 | End: 2020-07-11

## 2020-07-11 RX ADMIN — OCTREOTIDE ACETATE 50 MCG/HR: 500 INJECTION, SOLUTION INTRAVENOUS; SUBCUTANEOUS at 08:07

## 2020-07-11 RX ADMIN — FUROSEMIDE 40 MG: 10 INJECTION, SOLUTION INTRAMUSCULAR; INTRAVENOUS at 10:07

## 2020-07-11 RX ADMIN — LACTULOSE 200 G: 10 SOLUTION ORAL; RECTAL at 05:07

## 2020-07-11 RX ADMIN — LACTULOSE 200 G: 10 SOLUTION ORAL; RECTAL at 11:07

## 2020-07-11 RX ADMIN — CEFTRIAXONE 2 G: 2 INJECTION, SOLUTION INTRAVENOUS at 02:07

## 2020-07-11 RX ADMIN — LACTULOSE 200 G: 10 SOLUTION ORAL; RECTAL at 08:07

## 2020-07-11 RX ADMIN — Medication 150 MCG/HR: at 05:07

## 2020-07-11 RX ADMIN — MUPIROCIN: 20 OINTMENT TOPICAL at 10:07

## 2020-07-11 RX ADMIN — PANTOPRAZOLE SODIUM 40 MG: 40 INJECTION, POWDER, LYOPHILIZED, FOR SOLUTION INTRAVENOUS at 08:07

## 2020-07-11 RX ADMIN — LACTULOSE 200 G: 10 SOLUTION ORAL; RECTAL at 01:07

## 2020-07-11 RX ADMIN — POTASSIUM CHLORIDE 30 MEQ: 750 TABLET, EXTENDED RELEASE ORAL at 06:07

## 2020-07-11 RX ADMIN — Medication 1 PATCH: at 08:07

## 2020-07-11 RX ADMIN — MUPIROCIN: 20 OINTMENT TOPICAL at 08:07

## 2020-07-11 NOTE — ASSESSMENT & PLAN NOTE
Transfused last unit PRBC 7/10 for total of 6 this admit  Received Cryo as well  Repeat CBC daily  No active bleeding since admit  Hemodynamically stable  Follow H/H  Bleeding noted with EGD to oralpharynx and EV

## 2020-07-11 NOTE — PROGRESS NOTES
[facility] -   Hematology/Oncology  Progress Note    Patient Name: John Wray  Admission Date: 7/8/2020  Hospital Length of Stay: 2 days  Code Status: Full Code     Subjective:     HPI:  44 year old female, presented to the ER with weakness and SOB. Patient is currently confused and unable to provide any history, Hx obtained from the medical record and medical staff. Review of Care Everywhere shows a visit to [facility] on 08/27/19 for alcoholic cirrhosis with h/o esophageal varices, ascites and mild encephalopathy. She had positive HAV IgG and negative HCV RNA. HBV status unknown. 04/2019 US showed small ascites and CT scan showed diffuse fatty infiltration of liver with hepatosplenomegaly and enlarged gastric varices consistent with portal hypertension, and cholelithiasis. Baseline Hgb around 9. LFTs were similar to this admit with T. Bili over 10 and INR below 2.   Hgb: 3.8 after two units of blood, 2 units of FFP and 10mg Vit. K overnight. ER reported dark red stools overnight. No BM in ICU. INR: 2.0. LFT elevated. WBC elevated. Afebrile. No significant tachycardia and BP has been primarily normal to elevated. UA unremarkable. CXR showed possible process on the right. Nurse reports no complaints of pain from patient and no vomiting. Daughter at bedside reports patient continues to drink ETOH heavily, patient denies this.     Interval history:  Intubated and on mechanical vent.  Status post EGD on 07/10/2020 with evidence of esophageal variceal bleed.    Review of Systems   Unable to perform ROS: Intubated    Physical Exam  Constitutional:       General: She is not in acute distress.     Appearance: She is well-developed and overweight. She is ill-appearing. She is not toxic-appearing or diaphoretic.      Interventions: She is intubated and restrained.   HENT:      Head: Normocephalic and atraumatic.      Mouth/Throat:      Mouth: Mucous membranes are dry.      Eyes:      General: Lids are  normal. Scleral icterus present.      Pupils: Pupils are equal, round, and reactive to light.   Neck:      Musculoskeletal: Normal range of motion.      Vascular: No carotid bruit.      Trachea: Trachea normal.   Cardiovascular:      Rate and Rhythm: Normal rate and regular rhythm.      Pulses:           Radial pulses are 2+ on the right side and 2+ on the left side.        Dorsalis pedis pulses are 1+ on the right side and 1+ on the left side.      Heart sounds: Normal heart sounds.   Pulmonary:      Effort: Pulmonary effort is normal. No tachypnea, accessory muscle usage or respiratory distress. She is intubated.      Breath sounds: Transmitted upper airway sounds present. Rales present.   Abdominal:      General: Bowel sounds are decreased. There is distension (mild ).      Palpations: Abdomen is soft.      Tenderness: There is no abdominal tenderness.   Genitourinary:     Comments: Alicea and rectal tube in place  Musculoskeletal: Normal range of motion.      Right lower le+ Edema present.      Left lower le+ Edema present.      Right foot: No deformity.      Left foot: No deformity.   Lymphadenopathy:      Cervical: No cervical adenopathy.   Skin:     General: Skin is warm and dry.      Capillary Refill: Capillary refill takes less than 2 seconds.      Findings: No rash.   Neurological:      Mental Status: She is alert and easily aroused.                 Assessment/Plan:     # acute blood-loss anemia:  -status post 6 units of packed red blood cell within last unit on 07/10/2020.  -status post FFP, platelets and cryo.  -most recent hemoglobin noted at 8.3 grams/deciliter.  -status post EGD on 07/10/2020 that showed esophageal varices in the mid esophagus and distal esophagus which were banded per GI.  Evidence of portal hypertensive gastropathy was noted.  Varices is likely related to alcohol abuse.  Recommend repeat EGD in 4 weeks.  -continue to monitor daily CBC.  Target hemoglobin is above 8  grams/deciliter given cardiomyopathy.    #Coagulopathy  -Secondary to decompensated cirrhosis  - Daily INR    # severe thrombocytopenia:  -likely related to alcohol abuse, bone marrow damage by alcohol as well as decompensated liver disease.  -noted platelet count at 71769.  -will continue to monitor.    #Alcohol abuse:  -on alcohol withdrawal protocol per ICU management.  - case management to assist with possible rehab placement at discharge    #Decompensated HCV cirrhosis:  -Management per hepatology.      Thank you for your consult. I will follow-up with patient. Please contact us if you have any additional questions.     Trey Palumbo MD  Hematology/Oncology  [facility] -

## 2020-07-11 NOTE — ASSESSMENT & PLAN NOTE
Has had 6 units PRBCs since admit but none last 24 hours  Has also received FFP, Plt and Cryo  Daily CBC   No active bleeding since admit  Some bleeding noted in oralpharynx with EGD and EV recently bleeding  EGD 7/10: Large (> 5 mm) varices were found in the mid esophagus and in the        distal esophagus. Four bands were successfully placed with        incomplete eradication of varices. There was no bleeding during the        maneuver.        Severe portal hypertensive gastropathy was found in the entire        examined stomach.        The examined duodenum was normal.        Bleeding with small clots.

## 2020-07-11 NOTE — SUBJECTIVE & OBJECTIVE
Interval History: Pt was seen and examined in ICU. Remained stable last pm, no new c/o's. Discussed with ICU.    Review of patient's allergies indicates:  No Known Allergies  Current Facility-Administered Medications   Medication Frequency    cefTRIAXone (ROCEPHIN) 2 g/50 mL D5W IVPB Q24H    folic acid tablet 1 mg Daily    lactulose 10 gram/15 mL solution (enema) 200 g Q6H    morphine injection 2 mg Q4H PRN    multivitamin tablet Daily    mupirocin 2 % ointment BID    nicotine 21 mg/24 hr 1 patch Daily    octreotide (SANDOSTATIN) 500 mcg in sodium chloride 0.9% 100 mL infusion Continuous    ondansetron injection 4 mg Q8H PRN    pantoprazole injection 40 mg BID    potassium chloride SA CR tablet 30 mEq Q4H    sodium chloride 0.9% flush 10 mL PRN    thiamine tablet 100 mg Daily       Objective:     Vital Signs (Most Recent):  Temp: 99.7 °F (37.6 °C) (07/11/20 1505)  Pulse: 90 (07/11/20 1505)  Resp: 20 (07/11/20 1505)  BP: (!) 133/51 (07/11/20 1505)  SpO2: (!) 92 % (07/11/20 1505)  O2 Device (Oxygen Therapy): nasal cannula w/ humidification (07/11/20 0857) Vital Signs (24h Range):  Temp:  [98.4 °F (36.9 °C)-100.7 °F (38.2 °C)] 99.7 °F (37.6 °C)  Pulse:  [60-90] 90  Resp:  [4-25] 20  SpO2:  [92 %-100 %] 92 %  BP: ()/(27-71) 133/51     Weight: 90.7 kg (199 lb 15.3 oz) (07/11/20 0600)  Body mass index is 31.32 kg/m².  Body surface area is 2.07 meters squared.    I/O last 3 completed shifts:  In: 5126.8 [I.V.:1011.8; Blood:2215; NG/GT:1850; IV Piggyback:50]  Out: 2275 [Urine:1275; Stool:1000]    Physical Exam  Vitals signs and nursing note reviewed.   Constitutional:       Appearance: Normal appearance.   HENT:      Head: Normocephalic and atraumatic.   Neck:      Musculoskeletal: Normal range of motion.   Cardiovascular:      Rate and Rhythm: Normal rate and regular rhythm.      Pulses: Normal pulses.      Heart sounds: Normal heart sounds.   Pulmonary:      Effort: Pulmonary effort is normal.       Breath sounds: Normal breath sounds. No rales.   Abdominal:      General: Abdomen is flat.      Palpations: Abdomen is soft.   Musculoskeletal:      Right lower leg: No edema.   Skin:     General: Skin is warm and dry.   Neurological:      General: No focal deficit present.      Mental Status: She is alert and oriented to person, place, and time.         Significant Labs: reviewed  BMP  Lab Results   Component Value Date     07/11/2020     07/11/2020    K 3.4 (L) 07/11/2020    K 3.4 (L) 07/11/2020     07/11/2020     07/11/2020    CO2 21 (L) 07/11/2020    CO2 21 (L) 07/11/2020    BUN 54 (H) 07/11/2020    BUN 55 (H) 07/11/2020    CREATININE 2.2 (H) 07/11/2020    CREATININE 2.2 (H) 07/11/2020    CALCIUM 9.2 07/11/2020    CALCIUM 9.0 07/11/2020    ANIONGAP 13 07/11/2020    ANIONGAP 13 07/11/2020    ESTGFRAFRICA 31 (A) 07/11/2020    ESTGFRAFRICA 31 (A) 07/11/2020    EGFRNONAA 27 (A) 07/11/2020    EGFRNONAA 27 (A) 07/11/2020     Lab Results   Component Value Date    WBC 7.55 07/11/2020    HGB 8.3 (L) 07/11/2020    HGB 8.3 (L) 07/11/2020    HCT 24.4 (L) 07/11/2020    MCV 94 07/11/2020    PLT 47 (L) 07/11/2020           Significant Imaging: reviewed

## 2020-07-11 NOTE — PROGRESS NOTES
[facility] -   Critical Care Medicine  Progress Note    Patient Name: John Wray  MRN: [id number]  Admission Date: 7/8/2020  Hospital Length of Stay: 2 days  Code Status: Full Code  Attending Provider: Yonas Red MD  Primary Care Provider: Primary Doctor No   Principal Problem: Decompensated HCV cirrhosis    Subjective:     HPI:  Ms Wray is a 43 yo obese BF with a PMH of daily etoh use, Cirrhosis, Ascites, MDD, Hep C, HTN and tobacco abuse.  She presented to the ED on 7/8 due to worsening abdominal distension and SOB. Pt also described dark tarry stools consistent with melena. Per ED, pt's hgb was undetectable on arrival so she received 1.5 units of packed RBCs, which brought her hbg up to 3.8 and hct to 12.9. The rest of pt's CBC showed WBC 67119 and plts 4900. INR 2, Cr 1.6, CO2 8, AG 23, Mg 1.1,Total bilirubin 14.9. AST/ALT ratio >2. Ammonia 65. BNP 2829. Troponin 0.092. Stool occult blood test positive. Pt was hemodynamically stable in the ED and was admitted to ICU for presumptive dx of GI bleed w/ melena, severe symptomatic anemia, and decompensated liver cirrhosis with GI consult requested.     Hospital/ICU Course:  7/9 - This AM patient fully awake, alert and responsive but very confused and restless at times requiring frequent redirection.  She is receiving PRBCs.  She had melena at home and BRBPR in ED but no rectal bleeding since admit overnight.  VSS and she is in no distress talking on cell phone with daughter.   7/10 - Restless and confused last 24 hours now sedated and sleeping post Ativan IVP overnight.  No distress.  No active bleeding still since admit.    7/11 - EGD done yesterday noted large esophageal varices with stigma of recent bleeding and some bleeding suspected on oralpharynx.  Left intubated on mech vent overnight and tolerated SAT/SBT this AM.  VSS and no distress.      Review of Systems   Unable to perform ROS: Intubated         Objective:     Vital Signs (Most  Recent):  Temp: 100.3 °F (37.9 °C) (07/11/20 0705)  Pulse: 89 (07/11/20 0905)  Resp: (!) 23 (07/11/20 0905)  BP: (!) 146/64 (07/11/20 0905)  SpO2: (!) 94 % (07/11/20 0905) Vital Signs (24h Range):  Temp:  [98.4 °F (36.9 °C)-100.3 °F (37.9 °C)] 100.3 °F (37.9 °C)  Pulse:  [37-94] 89  Resp:  [4-40] 23  SpO2:  [88 %-100 %] 94 %  BP: ()/(27-96) 146/64     Weight: 90.7 kg (199 lb 15.3 oz)  Body mass index is 31.32 kg/m².      Intake/Output Summary (Last 24 hours) at 7/11/2020 0925  Last data filed at 7/11/2020 0905  Gross per 24 hour   Intake 3759.93 ml   Output 1990 ml   Net 1769.93 ml       Physical Exam  Constitutional:       General: She is not in acute distress.     Appearance: She is well-developed and overweight. She is ill-appearing. She is not toxic-appearing or diaphoretic.      Interventions: She is intubated and restrained.   HENT:      Head: Normocephalic and atraumatic.      Mouth/Throat:      Mouth: Mucous membranes are dry.      Comments: Dry blood around mouth and in oralpharynx  Eyes:      General: Lids are normal. Scleral icterus present.      Pupils: Pupils are equal, round, and reactive to light.   Neck:      Musculoskeletal: Normal range of motion.      Vascular: No carotid bruit.      Trachea: Trachea normal.   Cardiovascular:      Rate and Rhythm: Normal rate and regular rhythm.      Pulses:           Radial pulses are 2+ on the right side and 2+ on the left side.        Dorsalis pedis pulses are 1+ on the right side and 1+ on the left side.      Heart sounds: Normal heart sounds.   Pulmonary:      Effort: Pulmonary effort is normal. No tachypnea, accessory muscle usage or respiratory distress. She is intubated.      Breath sounds: Transmitted upper airway sounds present. Rales present.   Abdominal:      General: Bowel sounds are decreased. There is distension (mild ).      Palpations: Abdomen is soft.      Tenderness: There is no abdominal tenderness.   Genitourinary:     Comments: Alicea  and rectal tube in place  Musculoskeletal: Normal range of motion.      Right lower le+ Edema present.      Left lower le+ Edema present.      Right foot: No deformity.      Left foot: No deformity.   Lymphadenopathy:      Cervical: No cervical adenopathy.   Skin:     General: Skin is warm and dry.      Capillary Refill: Capillary refill takes less than 2 seconds.      Findings: No rash.   Neurological:      Mental Status: She is alert and easily aroused.      Comments: Lethargic and sleeping post Ativan IVP but arouses to stimuli and mumbles verbal responses         Vents:  Vent Mode: A/C (20)  Ventilator Initiated: Yes (07/10/20 1440)  Set Rate: 22 BPM (20)  Vt Set: 400 mL (20)  Pressure Support: 0 cmH20 (20)  PEEP/CPAP: 5 cmH20 (20)  Oxygen Concentration (%): 40 (20 08)  Peak Airway Pressure: 34 cmH2O (20)  Plateau Pressure: 0 cmH20 (20)  Total Ve: 9.52 mL (20)  F/VT Ratio<105 (RSBI): (!) 49 (20)    Lines/Drains/Airways     Drain                 Urethral Catheter 20 0130 Latex 16 Fr. 2 days         Rectal Tube 07/10/20 0950 rectal tube w/ balloon (indicate number of mLs) less than 1 day          Airway                 Airway - Non-Surgical 07/10/20 1353 Endotracheal Tube less than 1 day          Peripheral Intravenous Line                 Peripheral IV - Single Lumen 20 0055 18 G Left Antecubital 2 days         Peripheral IV - Single Lumen 07/10/20 1048 20 G Right;Posterior;Medial Forearm less than 1 day         Peripheral IV - Single Lumen 07/10/20 1049 20 G Left;Posterior Forearm less than 1 day                Significant Labs:    CBC/Anemia Profile:  Recent Labs   Lab 20  1007  07/10/20  0359  07/10/20  1758 20  0120 20  0445   WBC  --    < > 8.86  --  8.63  8.63  --  7.55   HGB  --    < > 6.7*   < > 7.7*  7.7* 8.5* 8.3*  8.3*   HCT  --    < > 20.6*  --  23.5*   23.5*  --  24.4*   PLT  --    < > 59*  --  53*  53*  --  47*   MCV  --    < > 99*  --  96  96  --  94   RDW  --    < > 23.5*  --  23.9*  23.9*  --  22.8*   FOLATE 4.0  --   --   --   --   --   --    [id number]  --   --   --   --   --   --     < > = values in this interval not displayed.        Chemistries:  Recent Labs   Lab 07/10/20  0359  07/10/20  1758 07/11/20  0120 07/11/20 0445     137   < > 137 138 139  139   K 4.0  3.9   < > 3.7 3.3* 3.4*  3.4*     102   < > 104 104 105  105   CO2 20*  20*   < > 21* 21* 21*  21*   BUN 43*  43*   < > 48* 52* 55*  54*   CREATININE 2.4*  2.5*   < > 2.2* 2.2* 2.2*  2.2*   CALCIUM 9.2  9.5   < > 9.2 9.0 9.0  9.2   ALBUMIN 2.8*  --   --   --  2.4*   PROT 9.2*  --   --   --  8.4   BILITOT 19.8*  --   --   --  18.3*   ALKPHOS 122  --   --   --  113   ALT 44  --   --   --  40   *  --   --   --  144*   MG 1.8  1.9   < > 2.0 2.0 2.0   PHOS 3.5  --   --   --   --     < > = values in this interval not displayed.       ABGs:   Recent Labs   Lab 07/11/20 0442   PH 7.401   PCO2 33.7*   HCO3 20.9*   POCSATURATED 97   BE -4     Coagulation:   Recent Labs   Lab 07/10/20  0801  07/11/20  0445   INR  --    < > 1.7*   APTT 30.0  --   --     < > = values in this interval not displayed.     All pertinent labs within the past 24 hours have been reviewed.  Ammonia 84    Significant Imaging:  CXR: I have reviewed all pertinent results/findings within the past 24 hours and my personal findings are:  Extensive consolidation throughout the entirety of the left lower lobe.  Patchy ground-glass infiltrates are seen throughout the entirety of the right lung and left upper lobe.      ABG  Recent Labs   Lab 07/11/20  0442   PH 7.401   PO2 87   PCO2 33.7*   HCO3 20.9*   BE -4     Assessment/Plan:     Neuro  Encephalopathy, metabolic  Likely multifactorial: Anemia, Met acidosis, elevated ammonia and SUNG  Much improved today post extubation, still lethargic but  responsive and cooperative  Cont Lactulose    Psychiatric  Alcohol abuse  Admit etoh < 10  Encourage etoh cessation once more coherent    Pulmonary  Acute respiratory failure with hypoxia  Left intubated and on vent overnight  Tolerated SAT/SBT this AM and extubated  ICU pulm monitoring  Cont low flow NC O2    Renal/  Acute renal failure superimposed on stage 3 chronic kidney disease  BP stable  Follow up BMP  Accurate I/Os  Renal following:   Likely due to severe anemia.  Creatinine was 0.8 on 4/15/19.  Avoid NSAIDs, ACE-I, ARB. No indication for dialysis at present. Will monitor closely.     Hematology  Coagulopathy  Secondary to decompensated cirrhosis  Also with thrombocytopenia  No transfusion today  Daily INR    Oncology  Acute blood loss anemia  Transfused last unit PRBC 7/10 for total of 6 this admit  Received Cryo as well  Repeat CBC daily  No active bleeding since admit  Hemodynamically stable  Follow H/H  Bleeding noted with EGD to oralpharynx and EV    GI  * Decompensated HCV cirrhosis  MELD score 31 this AM  Cont Octreotide (for 72 hours, stop in AM), PPI, Rocephin and Lactulose  GI following:   EGD 7/10: Large (> 5 mm) varices were found in the mid esophagus and in the        distal esophagus. Four bands were successfully placed with        incomplete eradication of varices. There was no bleeding during the        maneuver.        Severe portal hypertensive gastropathy was found in the entire        examined stomach.        The examined duodenum was normal.        Bleeding with small clots.   Monitor NH3, INR and CMP daily.   Cont NPO per GI  Repeat EGD in 4 weeks    Secondary esophageal varices with bleeding  Has had 6 units PRBCs since admit but none last 24 hours  Has also received FFP, Plt and Cryo  Daily CBC   No active bleeding since admit  Some bleeding noted in oralpharynx with EGD and EV recently bleeding  EGD 7/10: Large (> 5 mm) varices were found in the mid esophagus and in the         distal esophagus. Four bands were successfully placed with        incomplete eradication of varices. There was no bleeding during the        maneuver.        Severe portal hypertensive gastropathy was found in the entire        examined stomach.        The examined duodenum was normal.        Bleeding with small clots.         Preventive Measures and Monitoring:   Stress Ulcer: PPI  Nutrition: NPO   Glucose control:  stable  Bowel prophylaxis: active GI bleed.  On Lactulose  DVT prophylaxis: INR 2 with GI bleed.  Has SCDs  Hx CAD on B-Blocker: none due to GI bleed  Head of Bed/Reposition: Elevate HOB and turn Q1-2 hours   Early Mobility: bed rest  Alicea Day: #4  IVAB Day: #3  Code Status: Full  Pneumonia Vaccine: ordered     Counseling/Consultation:I have discussed the care of this patient in detail with the bedside nursing staff and Dr. Rondon and Dr. Red     Critical Care Time: 58 minutes  Critical secondary to Patient has a condition that poses threat to life and bodily function: Acute Renal Failure and acute Resp Failure intubated on mechanical ventilation  Patient has an abrupt change in neurologic status: Met Encephalopathy  Was on Fentanyl infusion with intubation     Critical care was time spent personally by me on the following activities: development of treatment plan with patient or surrogate and bedside caregivers, discussions with consultants, evaluation of patient's response to treatment, examination of patient, ordering and performing treatments and interventions, ordering and review of laboratory studies, ordering and review of radiographic studies, pulse oximetry, re-evaluation of patient's condition. This critical care time did not overlap with that of any other provider or involve time for any procedures.    Chepe Bahena NP  Critical Care Medicine  [facility] -

## 2020-07-11 NOTE — ASSESSMENT & PLAN NOTE
BP stable  Follow up BMP  Accurate I/Os  Renal following:   Likely due to severe anemia.  Creatinine was 0.8 on 4/15/19.  Avoid NSAIDs, ACE-I, ARB. No indication for dialysis at present. Will monitor closely.

## 2020-07-11 NOTE — PROGRESS NOTES
[facility] -   Nephrology  Progress Note    Patient Name: John Wray  MRN: [id number]  Admission Date: 7/8/2020  Hospital Length of Stay: 2 days  Attending Provider: Yonas Red MD   Primary Care Physician: Primary Doctor No  Principal Problem:Decompensated HCV cirrhosis    Subjective:     HPI: 44 year old female with HTN, portal hypertension, EtOH abuse, liver cirrhosis, Hep C presents to [facility] with severe GI bleed. Patient s/p blood transfusion with 2 units of PRBC in ER. Hgb was 3.8 following transfusion. She is receiving 3rd PRBC at present.  Patient also presents with SUGN and creatinine of 2.1 and metabolic acidosis.    Nephrology was consulted to help with patient's renal care while she is admitted at [facility]. I saw and examined patient in her hospital room. Patient reports that SOB and weakness have improved. She reports diarrhea yesterday. No nausea/vomiting. No pain at present, no LE edema. She made about 150 cc of urine overnight (recorded).     Chart review revealed creatinine of 0.8 on 4/15/19.     Interval History: Pt was seen and examined in ICU. Remained stable last pm, no new c/o's. Discussed with ICU.    Review of patient's allergies indicates:  No Known Allergies  Current Facility-Administered Medications   Medication Frequency    cefTRIAXone (ROCEPHIN) 2 g/50 mL D5W IVPB Q24H    folic acid tablet 1 mg Daily    lactulose 10 gram/15 mL solution (enema) 200 g Q6H    morphine injection 2 mg Q4H PRN    multivitamin tablet Daily    mupirocin 2 % ointment BID    nicotine 21 mg/24 hr 1 patch Daily    octreotide (SANDOSTATIN) 500 mcg in sodium chloride 0.9% 100 mL infusion Continuous    ondansetron injection 4 mg Q8H PRN    pantoprazole injection 40 mg BID    potassium chloride SA CR tablet 30 mEq Q4H    sodium chloride 0.9% flush 10 mL PRN    thiamine tablet 100 mg Daily       Objective:     Vital Signs (Most Recent):  Temp: 99.7 °F (37.6 °C) (07/11/20 1505)  Pulse: 90  (07/11/20 1505)  Resp: 20 (07/11/20 1505)  BP: (!) 133/51 (07/11/20 1505)  SpO2: (!) 92 % (07/11/20 1505)  O2 Device (Oxygen Therapy): nasal cannula w/ humidification (07/11/20 0857) Vital Signs (24h Range):  Temp:  [98.4 °F (36.9 °C)-100.7 °F (38.2 °C)] 99.7 °F (37.6 °C)  Pulse:  [60-90] 90  Resp:  [4-25] 20  SpO2:  [92 %-100 %] 92 %  BP: ()/(27-71) 133/51     Weight: 90.7 kg (199 lb 15.3 oz) (07/11/20 0600)  Body mass index is 31.32 kg/m².  Body surface area is 2.07 meters squared.    I/O last 3 completed shifts:  In: 5126.8 [I.V.:1011.8; Blood:2215; NG/GT:1850; IV Piggyback:50]  Out: 2275 [Urine:1275; Stool:1000]    Physical Exam  Vitals signs and nursing note reviewed.   Constitutional:       Appearance: Normal appearance.   HENT:      Head: Normocephalic and atraumatic.   Neck:      Musculoskeletal: Normal range of motion.   Cardiovascular:      Rate and Rhythm: Normal rate and regular rhythm.      Pulses: Normal pulses.      Heart sounds: Normal heart sounds.   Pulmonary:      Effort: Pulmonary effort is normal.      Breath sounds: Normal breath sounds. No rales.   Abdominal:      General: Abdomen is flat.      Palpations: Abdomen is soft.   Musculoskeletal:      Right lower leg: No edema.   Skin:     General: Skin is warm and dry.   Neurological:      General: No focal deficit present.      Mental Status: She is alert and oriented to person, place, and time.         Significant Labs: reviewed  BMP  Lab Results   Component Value Date     07/11/2020     07/11/2020    K 3.4 (L) 07/11/2020    K 3.4 (L) 07/11/2020     07/11/2020     07/11/2020    CO2 21 (L) 07/11/2020    CO2 21 (L) 07/11/2020    BUN 54 (H) 07/11/2020    BUN 55 (H) 07/11/2020    CREATININE 2.2 (H) 07/11/2020    CREATININE 2.2 (H) 07/11/2020    CALCIUM 9.2 07/11/2020    CALCIUM 9.0 07/11/2020    ANIONGAP 13 07/11/2020    ANIONGAP 13 07/11/2020    ESTGFRAFRICA 31 (A) 07/11/2020    ESTGFRAFRICA 31 (A) 07/11/2020     "EGFRNONAA 27 (A) 07/11/2020    EGFRNONAA 27 (A) 07/11/2020     Lab Results   Component Value Date    WBC 7.55 07/11/2020    HGB 8.3 (L) 07/11/2020    HGB 8.3 (L) 07/11/2020    HCT 24.4 (L) 07/11/2020    MCV 94 07/11/2020    PLT 47 (L) 07/11/2020     Urine na 46, urine Cr 84, FENa = 0.88%      Significant Imaging: reviewed    Assessment/Plan:     45 y/o female with SUNG on CKD and hepatic decompensation:     Acute renal failure  SUNG on CKD stage 3, baseline s Cr not known however.  s Cr stable, unchanged. Stable renal function  SUNG likely due to prerenal azotemia: hypotension, anemia, and diarrhea x 1 pre-admit  FENa <15, c/w prerenal azotemia. (pt is a little "dry")  K not high  Metabolic acidosis  UOP stable      H/o of hep C noted  U/a shows no proteinuria or hematuria, therefore hep C likely has not affected the kidneys.     Decompensated HCV cirrhosis  Has liver cirrhosis  Due to hep C and alcoholism  Decompensated, presented with GI bleed, likely has varices  Elevated bilirubin  Hypoalbuminemia  Hepatorenal syndrome is not unlikely, but is a diagnosis of exclusion  Will defer to GI    Pt's oldest daughter was at bedside. Met with her and advised her to encourage her mother to rfefrain from drinking alcohol. She conform pt is an alcoholic.        Plans and recommendations:  As reviewed above  Total critical care time spent 30 minutes        Cole Lunsford MD  Nephrology  [facility]  "

## 2020-07-11 NOTE — ASSESSMENT & PLAN NOTE
Although patient has stabilized from bleeding she still has significant liver decompensation with meld elevation.  Given her repeated alcohol use she is not currently a transplant candidate.  Would have to better re-evaluate this as an outpatient.  -continue to monitor meld labs daily      MELD-Na score: 31 at 7/11/2020  4:45 AM  MELD score: 31 at 7/11/2020  4:45 AM  Calculated from:  Serum Creatinine: 2.2 mg/dL at 7/11/2020  4:45 AM  Serum Sodium: 139 mmol/L (Rounded to 137 mmol/L) at 7/11/2020  4:45 AM  Total Bilirubin: 18.3 mg/dL at 7/11/2020  4:45 AM  INR(ratio): 1.7 at 7/11/2020  4:45 AM  Age: 44 years 6 months

## 2020-07-11 NOTE — PLAN OF CARE
Pt in bed intubated/sedated. Pause fentanyl gtt for SBT anticipating extubation. Pt extubate @0855. 3L/NC place on pt, able to hold O2 sats in the 90s throughout the shift. Pt frowsy, unable to stay awake. Bedside swallow screen perform, by afternoon pt able to jackie small tablespoons of water. Diurese pt with furosemide this morning, delgado cath secure draining chrissy color urine to gravity. Sandostatin gtt infusing. Pt abd distended and taut, hypoactive bowel sounds. Pt with no c/o pain. FMS in place with liquid stool noted, lactulose enemas continue. Pt daughter @bedside and update given.

## 2020-07-11 NOTE — SUBJECTIVE & OBJECTIVE
Subjective:     Interval History:  Patient has been extubated.  Hemoglobin is stable.  She has been more alert and responsive but with increased somnolence.  Chest x-ray seems worse, concern for volume overload.  She was given Lasix this morning IV.    Review of Systems   Unable to perform ROS: Mental status change     Objective:     Vital Signs (Most Recent):  Temp: (!) 100.7 °F (38.2 °C) (07/11/20 1105)  Pulse: 84 (07/11/20 1105)  Resp: (!) 7 (07/11/20 1105)  BP: (!) 142/63 (07/11/20 1105)  SpO2: 95 % (07/11/20 1105) Vital Signs (24h Range):  Temp:  [98.4 °F (36.9 °C)-100.7 °F (38.2 °C)] 100.7 °F (38.2 °C)  Pulse:  [60-94] 84  Resp:  [4-40] 7  SpO2:  [94 %-100 %] 95 %  BP: ()/(27-80) 142/63     Weight: 90.7 kg (199 lb 15.3 oz) (07/11/20 0600)  Body mass index is 31.32 kg/m².      Intake/Output Summary (Last 24 hours) at 7/11/2020 1203  Last data filed at 7/11/2020 1105  Gross per 24 hour   Intake 2430.43 ml   Output 1810 ml   Net 620.43 ml       Lines/Drains/Airways     Drain                 Urethral Catheter 07/09/20 0130 Latex 16 Fr. 2 days         Rectal Tube 07/10/20 0950 rectal tube w/ balloon (indicate number of mLs) 1 day          Airway                 Airway - Non-Surgical 07/10/20 1353 Endotracheal Tube less than 1 day          Peripheral Intravenous Line                 Peripheral IV - Single Lumen 07/09/20 0055 18 G Left Antecubital 2 days         Peripheral IV - Single Lumen 07/10/20 1048 20 G Right;Posterior;Medial Forearm 1 day         Peripheral IV - Single Lumen 07/10/20 1049 20 G Left;Posterior Forearm 1 day                Physical Exam  Vitals signs reviewed.   Constitutional:       General: She is not in acute distress.     Appearance: She is well-developed.   HENT:      Head: Normocephalic and atraumatic.      Mouth/Throat:      Pharynx: No oropharyngeal exudate.   Eyes:      General: Scleral icterus present.         Right eye: No discharge.         Left eye: No discharge.   Pulmonary:       Effort: Pulmonary effort is normal. No respiratory distress.      Breath sounds: Wheezing present.      Comments: Coarse breath sounds  Abdominal:      General: There is distension.      Palpations: Abdomen is soft.      Tenderness: There is no abdominal tenderness.   Neurological:      Mental Status: She is alert and oriented to person, place, and time.   Psychiatric:         Behavior: Behavior normal.         Significant Labs:  CBC:   Recent Labs   Lab 07/10/20  0359  07/10/20  1758 07/11/20  0120 07/11/20  0445   WBC 8.86  --  8.63  8.63  --  7.55   HGB 6.7*   < > 7.7*  7.7* 8.5* 8.3*  8.3*   HCT 20.6*  --  23.5*  23.5*  --  24.4*   PLT 59*  --  53*  53*  --  47*    < > = values in this interval not displayed.     CMP:   Recent Labs   Lab 07/11/20 0445   GLU 93  93   CALCIUM 9.0  9.2   ALBUMIN 2.4*   PROT 8.4     139   K 3.4*  3.4*   CO2 21*  21*     105   BUN 55*  54*   CREATININE 2.2*  2.2*   ALKPHOS 113   ALT 40   *   BILITOT 18.3*     Coagulation:   Recent Labs   Lab 07/10/20  0801  07/11/20 0445   INR  --    < > 1.7*   APTT 30.0  --   --     < > = values in this interval not displayed.         Significant Imaging:  Imaging results within the past 24 hours have been reviewed.

## 2020-07-11 NOTE — ASSESSMENT & PLAN NOTE
Likely multifactorial: Anemia, Met acidosis, elevated ammonia and SUNG  Much improved today post extubation, still lethargic but responsive and cooperative  Cont Lactulose

## 2020-07-11 NOTE — ASSESSMENT & PLAN NOTE
45 y/o female with SUNG on CKD and hepatic decompensation:     Acute renal failure  SUNG on CKD stage 3, baseline s Cr not known however.  s Cr not worse, stable renal function  SUNG likely due to prerenal azotemia: hypotension, anemia, and diarrhea x 1 pre-admit  K normal  Metabolic acidosis  UOP stable     Will send urine Na and Cr to calculate FENa     H/o of hep C noted  U/a shows no proteinuria or hematuria, therefore hep C likely has not affected the kidneys.     Decompensated HCV cirrhosis  Has liver cirrhosis  Due to hep C and alcoholism  Decompensated, presented with GI bleed, likely has varices  Elevated bilirubin  Hypoalbuminemia  Hepatorenal syndrome is not unlikely, but is a diagnosis of exclusion  Will defer to GI        Plans and recommendations:  As reviewed above  Total critical care time spent 30 minutes

## 2020-07-11 NOTE — ASSESSMENT & PLAN NOTE
Hemoglobin now stable after banding.  -complete octreotide for 72 hr  -continue PPI b.i.d.  -if patient is safe to swallow she can proceed with clear liquid diet then switched to soft diet then low sodium

## 2020-07-11 NOTE — ASSESSMENT & PLAN NOTE
Left intubated and on vent overnight  Tolerated SAT/SBT this AM and extubated  ICU pulm monitoring  Cont low flow NC O2

## 2020-07-11 NOTE — SUBJECTIVE & OBJECTIVE
Review of Systems   Unable to perform ROS: Intubated         Objective:     Vital Signs (Most Recent):  Temp: 100.3 °F (37.9 °C) (07/11/20 0705)  Pulse: 89 (07/11/20 0905)  Resp: (!) 23 (07/11/20 0905)  BP: (!) 146/64 (07/11/20 0905)  SpO2: (!) 94 % (07/11/20 0905) Vital Signs (24h Range):  Temp:  [98.4 °F (36.9 °C)-100.3 °F (37.9 °C)] 100.3 °F (37.9 °C)  Pulse:  [37-94] 89  Resp:  [4-40] 23  SpO2:  [88 %-100 %] 94 %  BP: ()/(27-96) 146/64     Weight: 90.7 kg (199 lb 15.3 oz)  Body mass index is 31.32 kg/m².      Intake/Output Summary (Last 24 hours) at 7/11/2020 0925  Last data filed at 7/11/2020 0905  Gross per 24 hour   Intake 3759.93 ml   Output 1990 ml   Net 1769.93 ml       Physical Exam  Constitutional:       General: She is not in acute distress.     Appearance: She is well-developed and overweight. She is ill-appearing. She is not toxic-appearing or diaphoretic.      Interventions: She is intubated and restrained.   HENT:      Head: Normocephalic and atraumatic.      Mouth/Throat:      Mouth: Mucous membranes are dry.      Comments: Dry blood around mouth and in oralpharynx  Eyes:      General: Lids are normal. Scleral icterus present.      Pupils: Pupils are equal, round, and reactive to light.   Neck:      Musculoskeletal: Normal range of motion.      Vascular: No carotid bruit.      Trachea: Trachea normal.   Cardiovascular:      Rate and Rhythm: Normal rate and regular rhythm.      Pulses:           Radial pulses are 2+ on the right side and 2+ on the left side.        Dorsalis pedis pulses are 1+ on the right side and 1+ on the left side.      Heart sounds: Normal heart sounds.   Pulmonary:      Effort: Pulmonary effort is normal. No tachypnea, accessory muscle usage or respiratory distress. She is intubated.      Breath sounds: Transmitted upper airway sounds present. Rales present.   Abdominal:      General: Bowel sounds are decreased. There is distension (mild ).      Palpations: Abdomen is  soft.      Tenderness: There is no abdominal tenderness.   Genitourinary:     Comments: Alicea and rectal tube in place  Musculoskeletal: Normal range of motion.      Right lower le+ Edema present.      Left lower le+ Edema present.      Right foot: No deformity.      Left foot: No deformity.   Lymphadenopathy:      Cervical: No cervical adenopathy.   Skin:     General: Skin is warm and dry.      Capillary Refill: Capillary refill takes less than 2 seconds.      Findings: No rash.   Neurological:      Mental Status: She is alert and easily aroused.      Comments: Lethargic and sleeping post Ativan IVP but arouses to stimuli and mumbles verbal responses         Vents:  Vent Mode: A/C (20)  Ventilator Initiated: Yes (07/10/20 1440)  Set Rate: 22 BPM (20)  Vt Set: 400 mL (20)  Pressure Support: 0 cmH20 (20)  PEEP/CPAP: 5 cmH20 (20)  Oxygen Concentration (%): 40 (20 08)  Peak Airway Pressure: 34 cmH2O (20)  Plateau Pressure: 0 cmH20 (20)  Total Ve: 9.52 mL (20)  F/VT Ratio<105 (RSBI): (!) 49 (20)    Lines/Drains/Airways     Drain                 Urethral Catheter 20 0130 Latex 16 Fr. 2 days         Rectal Tube 07/10/20 0950 rectal tube w/ balloon (indicate number of mLs) less than 1 day          Airway                 Airway - Non-Surgical 07/10/20 1353 Endotracheal Tube less than 1 day          Peripheral Intravenous Line                 Peripheral IV - Single Lumen 20 0055 18 G Left Antecubital 2 days         Peripheral IV - Single Lumen 07/10/20 1048 20 G Right;Posterior;Medial Forearm less than 1 day         Peripheral IV - Single Lumen 07/10/20 1049 20 G Left;Posterior Forearm less than 1 day                Significant Labs:    CBC/Anemia Profile:  Recent Labs   Lab 20  1007  07/10/20  0359  07/10/20  1758 20  0120 20  0445   WBC  --    < > 8.86  --  8.63  8.63  --  7.55    HGB  --    < > 6.7*   < > 7.7*  7.7* 8.5* 8.3*  8.3*   HCT  --    < > 20.6*  --  23.5*  23.5*  --  24.4*   PLT  --    < > 59*  --  53*  53*  --  47*   MCV  --    < > 99*  --  96  96  --  94   RDW  --    < > 23.5*  --  23.9*  23.9*  --  22.8*   FOLATE 4.0  --   --   --   --   --   --    UVANTXSD09 906  --   --   --   --   --   --     < > = values in this interval not displayed.        Chemistries:  Recent Labs   Lab 07/10/20  0359  07/10/20  1758 07/11/20 0120 07/11/20 0445     137   < > 137 138 139  139   K 4.0  3.9   < > 3.7 3.3* 3.4*  3.4*     102   < > 104 104 105  105   CO2 20*  20*   < > 21* 21* 21*  21*   BUN 43*  43*   < > 48* 52* 55*  54*   CREATININE 2.4*  2.5*   < > 2.2* 2.2* 2.2*  2.2*   CALCIUM 9.2  9.5   < > 9.2 9.0 9.0  9.2   ALBUMIN 2.8*  --   --   --  2.4*   PROT 9.2*  --   --   --  8.4   BILITOT 19.8*  --   --   --  18.3*   ALKPHOS 122  --   --   --  113   ALT 44  --   --   --  40   *  --   --   --  144*   MG 1.8  1.9   < > 2.0 2.0 2.0   PHOS 3.5  --   --   --   --     < > = values in this interval not displayed.       ABGs:   Recent Labs   Lab 07/11/20 0442   PH 7.401   PCO2 33.7*   HCO3 20.9*   POCSATURATED 97   BE -4     Coagulation:   Recent Labs   Lab 07/10/20  0801  07/11/20 0445   INR  --    < > 1.7*   APTT 30.0  --   --     < > = values in this interval not displayed.     All pertinent labs within the past 24 hours have been reviewed.  Ammonia 84    Significant Imaging:  CXR: I have reviewed all pertinent results/findings within the past 24 hours and my personal findings are:  Extensive consolidation throughout the entirety of the left lower lobe.  Patchy ground-glass infiltrates are seen throughout the entirety of the right lung and left upper lobe.

## 2020-07-11 NOTE — ASSESSMENT & PLAN NOTE
Admit etoh < 10  Encourage etoh cessation once more coherent   Will be get calls from the hospital in regards to following up patients, we must be sure to make arrangements to get the transitional care phone call documented. So please be sure to do that. Yes we will follow patient.

## 2020-07-11 NOTE — PROGRESS NOTES
[facility]  Gastroenterology  Progress Note    Patient Name: John Wray  MRN: [id number]  Admission Date: 7/8/2020  Hospital Length of Stay: 2 days  Code Status: Full Code   Attending Provider: Yonas Red MD  Consulting Provider: Edwige Spicer MD  Primary Care Physician: Primary Doctor No  Principal Problem: Decompensated HCV cirrhosis      Subjective:     Interval History:  Patient has been extubated.  Hemoglobin is stable.  She has been more alert and responsive but with increased somnolence.  Chest x-ray seems worse, concern for volume overload.  She was given Lasix this morning IV.    Review of Systems   Unable to perform ROS: Mental status change     Objective:     Vital Signs (Most Recent):  Temp: (!) 100.7 °F (38.2 °C) (07/11/20 1105)  Pulse: 84 (07/11/20 1105)  Resp: (!) 7 (07/11/20 1105)  BP: (!) 142/63 (07/11/20 1105)  SpO2: 95 % (07/11/20 1105) Vital Signs (24h Range):  Temp:  [98.4 °F (36.9 °C)-100.7 °F (38.2 °C)] 100.7 °F (38.2 °C)  Pulse:  [60-94] 84  Resp:  [4-40] 7  SpO2:  [94 %-100 %] 95 %  BP: ()/(27-80) 142/63     Weight: 90.7 kg (199 lb 15.3 oz) (07/11/20 0600)  Body mass index is 31.32 kg/m².      Intake/Output Summary (Last 24 hours) at 7/11/2020 1203  Last data filed at 7/11/2020 1105  Gross per 24 hour   Intake 2430.43 ml   Output 1810 ml   Net 620.43 ml       Lines/Drains/Airways     Drain                 Urethral Catheter 07/09/20 0130 Latex 16 Fr. 2 days         Rectal Tube 07/10/20 0950 rectal tube w/ balloon (indicate number of mLs) 1 day          Airway                 Airway - Non-Surgical 07/10/20 1353 Endotracheal Tube less than 1 day          Peripheral Intravenous Line                 Peripheral IV - Single Lumen 07/09/20 0055 18 G Left Antecubital 2 days         Peripheral IV - Single Lumen 07/10/20 1048 20 G Right;Posterior;Medial Forearm 1 day         Peripheral IV - Single Lumen 07/10/20 1049 20 G Left;Posterior Forearm 1 day                 Physical Exam  Vitals signs reviewed.   Constitutional:       General: She is not in acute distress.     Appearance: She is well-developed.   HENT:      Head: Normocephalic and atraumatic.      Mouth/Throat:      Pharynx: No oropharyngeal exudate.   Eyes:      General: Scleral icterus present.         Right eye: No discharge.         Left eye: No discharge.   Pulmonary:      Effort: Pulmonary effort is normal. No respiratory distress.      Breath sounds: Wheezing present.      Comments: Coarse breath sounds  Abdominal:      General: There is distension.      Palpations: Abdomen is soft.      Tenderness: There is no abdominal tenderness.   Neurological:      Mental Status: She is alert and oriented to person, place, and time.   Psychiatric:         Behavior: Behavior normal.         Significant Labs:  CBC:   Recent Labs   Lab 07/10/20  0359  07/10/20  1758 07/11/20  0120 07/11/20  0445   WBC 8.86  --  8.63  8.63  --  7.55   HGB 6.7*   < > 7.7*  7.7* 8.5* 8.3*  8.3*   HCT 20.6*  --  23.5*  23.5*  --  24.4*   PLT 59*  --  53*  53*  --  47*    < > = values in this interval not displayed.     CMP:   Recent Labs   Lab 07/11/20  0445   GLU 93  93   CALCIUM 9.0  9.2   ALBUMIN 2.4*   PROT 8.4     139   K 3.4*  3.4*   CO2 21*  21*     105   BUN 55*  54*   CREATININE 2.2*  2.2*   ALKPHOS 113   ALT 40   *   BILITOT 18.3*     Coagulation:   Recent Labs   Lab 07/10/20  0801  07/11/20  0445   INR  --    < > 1.7*   APTT 30.0  --   --     < > = values in this interval not displayed.         Significant Imaging:  Imaging results within the past 24 hours have been reviewed.    Assessment/Plan:     * Decompensated HCV cirrhosis  Although patient has stabilized from bleeding she still has significant liver decompensation with meld elevation.  Given her repeated alcohol use she is not currently a transplant candidate.  Would have to better re-evaluate this as an outpatient.  -continue to monitor meld  labs daily      MELD-Na score: 31 at 7/11/2020  4:45 AM  MELD score: 31 at 7/11/2020  4:45 AM  Calculated from:  Serum Creatinine: 2.2 mg/dL at 7/11/2020  4:45 AM  Serum Sodium: 139 mmol/L (Rounded to 137 mmol/L) at 7/11/2020  4:45 AM  Total Bilirubin: 18.3 mg/dL at 7/11/2020  4:45 AM  INR(ratio): 1.7 at 7/11/2020  4:45 AM  Age: 44 years 6 months        Hepatic encephalopathy  Patient still has some evidence of hepatic encephalopathy which is to be expected given how sick she was on admission.  This should slowly improve.  -continue with lactulose  -continue with ammonia monitoring  -avoid benzodiazepine, instead use antipsychotics as needed    Secondary esophageal varices with bleeding  Hemoglobin now stable after banding.  -complete octreotide for 72 hr  -continue PPI b.i.d.  -if patient is safe to swallow she can proceed with clear liquid diet then switched to soft diet then low sodium        Thank you for your consult. I will follow-up with patient. Please contact us if you have any additional questions.    Edwige Spicer MD  Gastroenterology  [facility]

## 2020-07-11 NOTE — ASSESSMENT & PLAN NOTE
MELD score 31 this AM  Cont Octreotide (for 72 hours, stop in AM), PPI, Rocephin and Lactulose  GI following:   EGD 7/10: Large (> 5 mm) varices were found in the mid esophagus and in the        distal esophagus. Four bands were successfully placed with        incomplete eradication of varices. There was no bleeding during the        maneuver.        Severe portal hypertensive gastropathy was found in the entire        examined stomach.        The examined duodenum was normal.        Bleeding with small clots.   Monitor NH3, INR and CMP daily.   Cont NPO per GI  Repeat EGD in 4 weeks

## 2020-07-11 NOTE — PLAN OF CARE
Patient currently resting quietly in bed. VS currently stable. Patient NSR on monitor at this time. Patient currently receiving oxygen via ventilator. Patient currently on fentanyl drip for sedation and to promote ventilator synchrony. Patient also on octreotide drip at this time. Patient received one unit of PRBC's earlier in shift and tolerated well. Patient turned in bed every 2 hours to avoid skin breakdown to back side and prevent wound development. Patient turned with 2 nursing assist and positioned with pillows. No sign of wound development or skin breakdown noted. Plan of care updated with patient and family. There are no further questions after updated on plan of care at this time. No distress noted. Will continue to monitor.

## 2020-07-11 NOTE — ASSESSMENT & PLAN NOTE
Patient still has some evidence of hepatic encephalopathy which is to be expected given how sick she was on admission.  This should slowly improve.  -continue with lactulose  -continue with ammonia monitoring  -avoid benzodiazepine, instead use antipsychotics as needed

## 2020-07-11 NOTE — NURSING
Pt is NPO and medication is needed for ammonia.  Rectal tube placed (family aware and vu) for infusion of lactulose rectally with clamp and then release.  Pt had 1 unit of RBC and 1 unit Cyro.  H/H >8 EGD scheduled for bedside.  Consent obtained via phone by daughter Kalee by provider.  Pt required intubation before the EGD to protect airway during procedure.  EGD performed 4 bands placed but bleeding in the oropharyngeal region.  Daughters updated by provider.  Pt requiring restraints as to not interfere with lines, tubes, and being uncooperative to care.  Family aware of this as well.

## 2020-07-12 PROBLEM — E87.8 ELECTROLYTE IMBALANCE: Status: ACTIVE | Noted: 2020-07-12

## 2020-07-12 LAB
ALBUMIN SERPL BCP-MCNC: 2.3 G/DL (ref 3.5–5.2)
ALP SERPL-CCNC: 110 U/L (ref 55–135)
ALT SERPL W/O P-5'-P-CCNC: 39 U/L (ref 10–44)
AMMONIA PLAS-SCNC: 61 UMOL/L (ref 10–50)
ANION GAP SERPL CALC-SCNC: 12 MMOL/L (ref 8–16)
ANION GAP SERPL CALC-SCNC: 12 MMOL/L (ref 8–16)
ANION GAP SERPL CALC-SCNC: 16 MMOL/L (ref 8–16)
AST SERPL-CCNC: 112 U/L (ref 10–40)
BASOPHILS # BLD AUTO: 0.05 K/UL (ref 0–0.2)
BASOPHILS NFR BLD: 0.6 % (ref 0–1.9)
BILIRUB SERPL-MCNC: 17.8 MG/DL (ref 0.1–1)
BUN SERPL-MCNC: 51 MG/DL (ref 6–20)
BUN SERPL-MCNC: 54 MG/DL (ref 6–20)
BUN SERPL-MCNC: 54 MG/DL (ref 6–20)
CALCIUM SERPL-MCNC: 9 MG/DL (ref 8.7–10.5)
CALCIUM SERPL-MCNC: 9 MG/DL (ref 8.7–10.5)
CALCIUM SERPL-MCNC: 9.5 MG/DL (ref 8.7–10.5)
CHLORIDE SERPL-SCNC: 102 MMOL/L (ref 95–110)
CHLORIDE SERPL-SCNC: 108 MMOL/L (ref 95–110)
CHLORIDE SERPL-SCNC: 108 MMOL/L (ref 95–110)
CO2 SERPL-SCNC: 22 MMOL/L (ref 23–29)
CREAT SERPL-MCNC: 1.9 MG/DL (ref 0.5–1.4)
CREAT SERPL-MCNC: 1.9 MG/DL (ref 0.5–1.4)
CREAT SERPL-MCNC: 2 MG/DL (ref 0.5–1.4)
DAT IGG-SP REAG RBC-IMP: NORMAL
DIFFERENTIAL METHOD: ABNORMAL
EOSINOPHIL # BLD AUTO: 0.3 K/UL (ref 0–0.5)
EOSINOPHIL NFR BLD: 3.1 % (ref 0–8)
ERYTHROCYTE [DISTWIDTH] IN BLOOD BY AUTOMATED COUNT: 25 % (ref 11.5–14.5)
EST. GFR  (AFRICAN AMERICAN): 34 ML/MIN/1.73 M^2
EST. GFR  (AFRICAN AMERICAN): 37 ML/MIN/1.73 M^2
EST. GFR  (AFRICAN AMERICAN): 37 ML/MIN/1.73 M^2
EST. GFR  (NON AFRICAN AMERICAN): 30 ML/MIN/1.73 M^2
EST. GFR  (NON AFRICAN AMERICAN): 32 ML/MIN/1.73 M^2
EST. GFR  (NON AFRICAN AMERICAN): 32 ML/MIN/1.73 M^2
GLUCOSE SERPL-MCNC: 135 MG/DL (ref 70–110)
GLUCOSE SERPL-MCNC: 94 MG/DL (ref 70–110)
GLUCOSE SERPL-MCNC: 94 MG/DL (ref 70–110)
HAPTOGLOB SERPL-MCNC: <10 MG/DL (ref 30–250)
HCT VFR BLD AUTO: 27.8 % (ref 37–48.5)
HGB BLD-MCNC: 9.1 G/DL (ref 12–16)
HGB BLD-MCNC: 9.1 G/DL (ref 12–16)
HGB BLD-MCNC: 9.4 G/DL (ref 12–16)
IMM GRANULOCYTES # BLD AUTO: 0.17 K/UL (ref 0–0.04)
IMM GRANULOCYTES NFR BLD AUTO: 2.1 % (ref 0–0.5)
INR PPP: 1.7 (ref 0.8–1.2)
LDH SERPL L TO P-CCNC: 333 U/L (ref 110–260)
LYMPHOCYTES # BLD AUTO: 0.7 K/UL (ref 1–4.8)
LYMPHOCYTES NFR BLD: 8.5 % (ref 18–48)
MAGNESIUM SERPL-MCNC: 1.6 MG/DL (ref 1.6–2.6)
MAGNESIUM SERPL-MCNC: 1.7 MG/DL (ref 1.6–2.6)
MCH RBC QN AUTO: 31.3 PG (ref 27–31)
MCHC RBC AUTO-ENTMCNC: 32.7 G/DL (ref 32–36)
MCV RBC AUTO: 96 FL (ref 82–98)
MONOCYTES # BLD AUTO: 1 K/UL (ref 0.3–1)
MONOCYTES NFR BLD: 13 % (ref 4–15)
NEUTROPHILS # BLD AUTO: 5.8 K/UL (ref 1.8–7.7)
NEUTROPHILS NFR BLD: 72.7 % (ref 38–73)
NRBC BLD-RTO: 1 /100 WBC
PLATELET # BLD AUTO: 42 K/UL (ref 150–350)
PMV BLD AUTO: 9.8 FL (ref 9.2–12.9)
POTASSIUM SERPL-SCNC: 3.4 MMOL/L (ref 3.5–5.1)
POTASSIUM SERPL-SCNC: 3.5 MMOL/L (ref 3.5–5.1)
POTASSIUM SERPL-SCNC: 3.5 MMOL/L (ref 3.5–5.1)
PROT SERPL-MCNC: 8.5 G/DL (ref 6–8.4)
PROTHROMBIN TIME: 17.2 SEC (ref 9–12.5)
RBC # BLD AUTO: 2.91 M/UL (ref 4–5.4)
SODIUM SERPL-SCNC: 140 MMOL/L (ref 136–145)
SODIUM SERPL-SCNC: 142 MMOL/L (ref 136–145)
SODIUM SERPL-SCNC: 142 MMOL/L (ref 136–145)
WBC # BLD AUTO: 7.98 K/UL (ref 3.9–12.7)

## 2020-07-12 PROCEDURE — 25000003 PHARM REV CODE 250: Performed by: NURSE PRACTITIONER

## 2020-07-12 PROCEDURE — 36415 COLL VENOUS BLD VENIPUNCTURE: CPT

## 2020-07-12 PROCEDURE — 63600175 PHARM REV CODE 636 W HCPCS: Performed by: NURSE PRACTITIONER

## 2020-07-12 PROCEDURE — 85018 HEMOGLOBIN: CPT

## 2020-07-12 PROCEDURE — 80053 COMPREHEN METABOLIC PANEL: CPT

## 2020-07-12 PROCEDURE — 83010 ASSAY OF HAPTOGLOBIN QUANT: CPT

## 2020-07-12 PROCEDURE — 99232 SBSQ HOSP IP/OBS MODERATE 35: CPT | Mod: ,,, | Performed by: INTERNAL MEDICINE

## 2020-07-12 PROCEDURE — S4991 NICOTINE PATCH NONLEGEND: HCPCS | Performed by: FAMILY MEDICINE

## 2020-07-12 PROCEDURE — 85610 PROTHROMBIN TIME: CPT

## 2020-07-12 PROCEDURE — 97110 THERAPEUTIC EXERCISES: CPT

## 2020-07-12 PROCEDURE — 27000221 HC OXYGEN, UP TO 24 HOURS

## 2020-07-12 PROCEDURE — 25000003 PHARM REV CODE 250

## 2020-07-12 PROCEDURE — 97530 THERAPEUTIC ACTIVITIES: CPT

## 2020-07-12 PROCEDURE — C9113 INJ PANTOPRAZOLE SODIUM, VIA: HCPCS | Performed by: FAMILY MEDICINE

## 2020-07-12 PROCEDURE — C9113 INJ PANTOPRAZOLE SODIUM, VIA: HCPCS | Performed by: NURSE PRACTITIONER

## 2020-07-12 PROCEDURE — 99291 PR CRITICAL CARE, E/M 30-74 MINUTES: ICD-10-PCS | Mod: ,,, | Performed by: INTERNAL MEDICINE

## 2020-07-12 PROCEDURE — 25000003 PHARM REV CODE 250: Performed by: INTERNAL MEDICINE

## 2020-07-12 PROCEDURE — 83615 LACTATE (LD) (LDH) ENZYME: CPT

## 2020-07-12 PROCEDURE — 97165 OT EVAL LOW COMPLEX 30 MIN: CPT

## 2020-07-12 PROCEDURE — 99233 SBSQ HOSP IP/OBS HIGH 50: CPT | Mod: ,,, | Performed by: INTERNAL MEDICINE

## 2020-07-12 PROCEDURE — 97162 PT EVAL MOD COMPLEX 30 MIN: CPT

## 2020-07-12 PROCEDURE — 80048 BASIC METABOLIC PNL TOTAL CA: CPT

## 2020-07-12 PROCEDURE — 82140 ASSAY OF AMMONIA: CPT

## 2020-07-12 PROCEDURE — 99900035 HC TECH TIME PER 15 MIN (STAT)

## 2020-07-12 PROCEDURE — 86880 COOMBS TEST DIRECT: CPT

## 2020-07-12 PROCEDURE — 99291 CRITICAL CARE FIRST HOUR: CPT | Mod: ,,, | Performed by: INTERNAL MEDICINE

## 2020-07-12 PROCEDURE — 63600175 PHARM REV CODE 636 W HCPCS: Performed by: FAMILY MEDICINE

## 2020-07-12 PROCEDURE — 94761 N-INVAS EAR/PLS OXIMETRY MLT: CPT

## 2020-07-12 PROCEDURE — 97535 SELF CARE MNGMENT TRAINING: CPT

## 2020-07-12 PROCEDURE — 99233 PR SUBSEQUENT HOSPITAL CARE,LEVL III: ICD-10-PCS | Mod: ,,, | Performed by: INTERNAL MEDICINE

## 2020-07-12 PROCEDURE — 63600175 PHARM REV CODE 636 W HCPCS

## 2020-07-12 PROCEDURE — 83735 ASSAY OF MAGNESIUM: CPT | Mod: 91

## 2020-07-12 PROCEDURE — 99291 PR CRITICAL CARE, E/M 30-74 MINUTES: ICD-10-PCS | Mod: ,,, | Performed by: NURSE PRACTITIONER

## 2020-07-12 PROCEDURE — 25000003 PHARM REV CODE 250: Performed by: FAMILY MEDICINE

## 2020-07-12 PROCEDURE — 99291 CRITICAL CARE FIRST HOUR: CPT | Mod: ,,, | Performed by: NURSE PRACTITIONER

## 2020-07-12 PROCEDURE — 85025 COMPLETE CBC W/AUTO DIFF WBC: CPT

## 2020-07-12 PROCEDURE — 99232 PR SUBSEQUENT HOSPITAL CARE,LEVL II: ICD-10-PCS | Mod: ,,, | Performed by: INTERNAL MEDICINE

## 2020-07-12 PROCEDURE — 21400001 HC TELEMETRY ROOM

## 2020-07-12 RX ORDER — LACTULOSE 10 G/15ML
20 SOLUTION ORAL 3 TIMES DAILY
Status: DISCONTINUED | OUTPATIENT
Start: 2020-07-12 | End: 2020-07-13 | Stop reason: HOSPADM

## 2020-07-12 RX ORDER — FUROSEMIDE 10 MG/ML
40 INJECTION INTRAMUSCULAR; INTRAVENOUS ONCE
Status: COMPLETED | OUTPATIENT
Start: 2020-07-12 | End: 2020-07-12

## 2020-07-12 RX ORDER — PROPRANOLOL HYDROCHLORIDE 20 MG/1
20 TABLET ORAL 2 TIMES DAILY
Status: DISCONTINUED | OUTPATIENT
Start: 2020-07-12 | End: 2020-07-13 | Stop reason: HOSPADM

## 2020-07-12 RX ORDER — POTASSIUM CHLORIDE 20 MEQ/1
40 TABLET, EXTENDED RELEASE ORAL ONCE
Status: COMPLETED | OUTPATIENT
Start: 2020-07-12 | End: 2020-07-12

## 2020-07-12 RX ORDER — LANOLIN ALCOHOL/MO/W.PET/CERES
400 CREAM (GRAM) TOPICAL ONCE
Status: COMPLETED | OUTPATIENT
Start: 2020-07-12 | End: 2020-07-12

## 2020-07-12 RX ADMIN — LACTULOSE 20 G: 20 SOLUTION ORAL at 09:07

## 2020-07-12 RX ADMIN — Medication 100 MG: at 08:07

## 2020-07-12 RX ADMIN — MUPIROCIN: 20 OINTMENT TOPICAL at 08:07

## 2020-07-12 RX ADMIN — OCTREOTIDE ACETATE 50 MCG/HR: 500 INJECTION, SOLUTION INTRAVENOUS; SUBCUTANEOUS at 06:07

## 2020-07-12 RX ADMIN — FUROSEMIDE 40 MG: 10 INJECTION, SOLUTION INTRAMUSCULAR; INTRAVENOUS at 09:07

## 2020-07-12 RX ADMIN — PANTOPRAZOLE SODIUM 40 MG: 40 INJECTION, POWDER, LYOPHILIZED, FOR SOLUTION INTRAVENOUS at 08:07

## 2020-07-12 RX ADMIN — Medication 400 MG: at 09:07

## 2020-07-12 RX ADMIN — PROPRANOLOL HYDROCHLORIDE 20 MG: 20 TABLET ORAL at 09:07

## 2020-07-12 RX ADMIN — CEFTRIAXONE 2 G: 2 INJECTION, SOLUTION INTRAVENOUS at 01:07

## 2020-07-12 RX ADMIN — FOLIC ACID 1 MG: 1 TABLET ORAL at 08:07

## 2020-07-12 RX ADMIN — MUPIROCIN: 20 OINTMENT TOPICAL at 09:07

## 2020-07-12 RX ADMIN — LACTULOSE 200 G: 10 SOLUTION ORAL; RECTAL at 05:07

## 2020-07-12 RX ADMIN — PANTOPRAZOLE SODIUM 40 MG: 40 INJECTION, POWDER, LYOPHILIZED, FOR SOLUTION INTRAVENOUS at 09:07

## 2020-07-12 RX ADMIN — THERA TABS 1 TABLET: TAB at 08:07

## 2020-07-12 RX ADMIN — LACTULOSE 20 G: 20 SOLUTION ORAL at 02:07

## 2020-07-12 RX ADMIN — POTASSIUM CHLORIDE 40 MEQ: 20 TABLET, EXTENDED RELEASE ORAL at 09:07

## 2020-07-12 RX ADMIN — Medication 1 PATCH: at 08:07

## 2020-07-12 RX ADMIN — PROPRANOLOL HYDROCHLORIDE 20 MG: 20 TABLET ORAL at 01:07

## 2020-07-12 NOTE — PROGRESS NOTES
[facility] Hospital Medicine  Progress Note    Patient Name: John Wray  MRN: [id number]  Patient Class: IP- Inpatient   Admission Date: 7/8/2020  Length of Stay: 2 days  Attending Physician: Yonas Red MD  Primary Care Provider: Primary Doctor No        Subjective:     Principal Problem:Decompensated HCV cirrhosis        HPI:  Ms. Wray is a 44-year-old  female with known history of liver cirrhosis from chronic alcohol abuse and hepatitis, presented to the ED complaining of worsening abdominal distention and shortness of breath the past few days.  Reports last alcoholic drink few weeks ago.  Patient is a very poor historian.  She also describes black tarry stools for the past few weeks, but has not sought medical attention until today.  Hemoglobin was undetectable upon initial arrival.  She received 1.5 unit of PRBC transfusion so far, repeat CBC reveals hemoglobin 3.8, hematocrit 12.9, WBC 25004, with platelets 25449.  INR 2.0, not on chronic anticoagulation.  Creatinine elevated at 1.6, CO2 8.  Total bilirubin 14.9, , ALT 44.  Ammonia 65.  BNP 28 29.  Troponin 0.092.  Stool Hemoccult test is positive.  Patient is hemodynamically stable, blood pressure 141/65.  Patient reports history of paracentesis long time ago.    Admitting diagnosis GI bleed with melena, severe symptomatic anemia, hemoglobin initially undetectable, now 3.8 after 1.5 unit PRBC transfusion.  Decompensated liver cirrhosis.    Overview/Hospital Course:  7/10: s/p 3 units pRBC, hgb still < 7. Will transfuse 1 more unit. Haptoglobin low concerned for hemolysis. Will repeat haptoglobin. Repeat PT/PTT. Continue FFP if needed. Plt improved after 1 units of plt given. Cont to monitor in ICU.   7/11 - Esophageal varices s/p banding. Intubated / Sedaetd. Discussed with cc team    Interval History: Intubated / Sedated    Review of Systems   Unable to perform ROS: Intubated     Objective:     Vital Signs  (Most Recent):  Temp: 99.1 °F (37.3 °C) (07/11/20 1905)  Pulse: 88 (07/11/20 2105)  Resp: (!) 44 (07/11/20 2100)  BP: 124/61 (07/11/20 2100)  SpO2: (!) 94 % (07/11/20 2100) Vital Signs (24h Range):  Temp:  [98.4 °F (36.9 °C)-100.7 °F (38.2 °C)] 99.1 °F (37.3 °C)  Pulse:  [60-90] 88  Resp:  [7-44] 44  SpO2:  [92 %-100 %] 94 %  BP: ()/(29-80) 124/61     Weight: 90.7 kg (199 lb 15.3 oz)  Body mass index is 31.32 kg/m².    Intake/Output Summary (Last 24 hours) at 7/11/2020 2137  Last data filed at 7/11/2020 2100  Gross per 24 hour   Intake 1144.37 ml   Output 3200 ml   Net -2055.63 ml      Physical Exam  Constitutional:       General: She is not in acute distress.     Appearance: She is well-developed. She is ill-appearing and toxic-appearing. She is not diaphoretic.   HENT:      Head: Normocephalic and atraumatic.      Mouth/Throat:      Mouth: Mucous membranes are dry.   Eyes:      General: Scleral icterus present.      Pupils: Pupils are equal, round, and reactive to light.   Cardiovascular:      Rate and Rhythm: Normal rate and regular rhythm.      Heart sounds: Normal heart sounds. No murmur. No friction rub. No gallop.    Pulmonary:      Effort: Pulmonary effort is normal. No respiratory distress.      Breath sounds: Normal breath sounds. No stridor. No wheezing or rales.   Abdominal:      General: Bowel sounds are normal. There is no distension.      Palpations: Abdomen is soft. There is no mass.      Tenderness: There is no abdominal tenderness. There is no guarding.   Musculoskeletal:      Right lower leg: Edema present.      Left lower leg: Edema present.   Skin:     General: Skin is warm.      Capillary Refill: Capillary refill takes 2 to 3 seconds.      Coloration: Skin is jaundiced.      Findings: No erythema.   Neurological:      Motor: Weakness present.      Comments: disoriented         Significant Labs:   BMP:   Recent Labs   Lab 07/11/20  1548   GLU 90      K 3.2*      CO2 20*   BUN  55*   CREATININE 2.0*   CALCIUM 9.1   MG 1.8     CBC:   Recent Labs   Lab 07/10/20  0359  07/10/20  1758 07/11/20  0120 07/11/20 0445 07/11/20  1548   WBC 8.86  --  8.63  8.63  --  7.55  --    HGB 6.7*   < > 7.7*  7.7* 8.5* 8.3*  8.3* 8.5*   HCT 20.6*  --  23.5*  23.5*  --  24.4*  --    PLT 59*  --  53*  53*  --  47*  --     < > = values in this interval not displayed.     CMP:   Recent Labs   Lab 07/10/20  0359  07/11/20  0120 07/11/20  0445 07/11/20  1548     137   < > 138 139  139 143   K 4.0  3.9   < > 3.3* 3.4*  3.4* 3.2*     102   < > 104 105  105 106   CO2 20*  20*   < > 21* 21*  21* 20*   *  114*   < > 105 93  93 90   BUN 43*  43*   < > 52* 55*  54* 55*   CREATININE 2.4*  2.5*   < > 2.2* 2.2*  2.2* 2.0*   CALCIUM 9.2  9.5   < > 9.0 9.0  9.2 9.1   PROT 9.2*  --   --  8.4  --    ALBUMIN 2.8*  --   --  2.4*  --    BILITOT 19.8*  --   --  18.3*  --    ALKPHOS 122  --   --  113  --    *  --   --  144*  --    ALT 44  --   --  40  --    ANIONGAP 14  15   < > 13 13  13 17*   EGFRNONAA 24*  23*   < > 27* 27*  27* 30*    < > = values in this interval not displayed.     All pertinent labs within the past 24 hours have been reviewed.    Significant Imaging: I have reviewed all pertinent imaging results/findings within the past 24 hours.      Assessment/Plan:      * Decompensated HCV cirrhosis  Longstanding history of alcoholic liver disease and hepatitis.  Patient continues to drink alcohol, last drink few weeks ago (per patient).  GI consult.  Patient received Lasix 60 mg IV x1 in the ED.  Reports noncompliance with all medications, took medications many months ago.    7/10:  Bilirubin continue to trend up  abd u/s did not show CBD dilatation  GI on case  Patient continues to drink per report    7/11  GI  Cessation  Improving LFT's    Alcohol abuse  Plan for cessation  GI        Hepatic encephalopathy  7/10:  Ammonia level continue to be elevated  Patient  refusing oral lactulose  Will continue lactulose rectally         Acute renal failure superimposed on stage 3 chronic kidney disease  7/10:  Creatinine 2.4  Urine output poor despite lasix   Nephrology consulted on case      Encephalopathy  Ammonia 65.  Patient is intermittently confused.  Will need lactulose after EGD.      Secondary esophageal varices with bleeding  GI bleed with melena.  Hemoglobin 3.8 after 1.5 unit PRBC transfusion.  Keep NPO.  GI consulted.  For possible EGD today.    7/10:  Hgb < 7 this am  Will transfuse 1 unit pRBC  GI consulted on case     7/11  S/p EGD  Banding  GI on board  CLD on extubation  XFuse as indicated      Acute blood loss anemia  Severe symptomatic anemia.  Hemoglobin was undetectable initially one in the ED presentation, currently 3.8 after 1.5 units of PRBC transfusion.  Transfused total of 3 units PRBC.  Repeat H&H and may need further transfusion after that.  Closely monitor in the ICU.  Patient denies hematemesis.    7/11  Stable  CLD on tolerance  GI on Consult      SUNG (acute kidney injury)  Serum creatinine elevated 1.6, likely secondary to gastrointestinal bleeding.  Transfuse 3 units PRBC total.  Repeat labs in a.m..  Consider nephrology consult.      Coagulopathy  INR 2.0, not on oral anticoagulation.  Coagulopathy secondary to decompensated liver cirrhosis.  Vitamin K 10 mg IV x1.    7/10:  Continue to monitor   PT/PTT  FFP if needed   Fibrinogen > 100   Haptoglobin low, will repeat  Concern for hemolysis  Heme/onc on case     7/11  Heme Onc  Monitor  Transfuse as indicated        VTE Risk Mitigation (From admission, onward)         Ordered     IP VTE LOW RISK PATIENT  Once      07/10/20 1446     Place sequential compression device  Until discontinued      07/09/20 0000                Critical care time spent on the evaluation and treatment of severe organ dysfunction, review of pertinent labs and imaging studies, discussions with consulting providers and  discussions with patient/family: 35 minutes.      Yonas Red MD  Department of Hospital Medicine   [facility]

## 2020-07-12 NOTE — PT/OT/SLP EVAL
Physical Therapy Evaluation    Patient Name:  John Wray   MRN:  [id number]    Recommendations:     Discharge Recommendations:  home health PT(family support)   Discharge Equipment Recommendations: (tbd prior to d/c; may need a RW if still unsteady with gt)   Barriers to discharge: None    Assessment:     John Wray is a 44 y.o. female admitted with a medical diagnosis of Decompensated HCV cirrhosis.  She presents with the following impairments/functional limitations:  weakness, gait instability, decreased lower extremity function, impaired balance, impaired endurance, decreased safety awareness, impaired self care skills, impaired functional mobilty, decreased coordination.    Rehab Prognosis: Good; patient would benefit from acute skilled PT services to address these deficits and reach maximum level of function.    Recent Surgery: Procedure(s) (LRB):  EGD (ESOPHAGOGASTRODUODENOSCOPY) (N/A) 2 Days Post-Op    Plan:     During this hospitalization, patient to be seen 5 x/week to address the identified rehab impairments via therapeutic activities, therapeutic exercises, gait training and progress toward the following goals:    · Plan of Care Expires:  07/19/20    Subjective     Chief Complaint: weakness; unsteady gt  Patient/Family Comments/goals: get stronger; feel better; go home  Pain/Comfort:  · Pain Rating 1: 0/10    Patients cultural, spiritual, Zoroastrian conflicts given the current situation:      Living Environment:  Lives with dtr in trailer with 5 steps and 2 rails to enter  Prior to admission, patients level of function was mod indep.  Equipment used at home: bedside commode, cane, straight.  DME owned (not currently used): bedside commode.  Upon discharge, patient will have assistance from family and hh services.    Objective:     Communicated with RN prior to session.  Patient found HOB elevated with telemetry  upon PT entry to room.    General Precautions: Standard, fall   Orthopedic  Precautions:N/A   Braces: N/A     Exams:  · B LE rom wfl and strength 4-/5 grossly    Functional Mobility:  · Bed Mobility min A for supine to sit with cues for log-rolling and use of bed rail  · Transfers - sit to/from stand with RW x 2 reps, min a for balance and cues for safe hand placement; stand bal F with RW for adl  · Gt - Amb 15ft RW min A for balance and safe RW use    Therapeutic Activities and Exercises:   PT educated patient/dtr on POC, B LE TE with vc's and PT demo for correct technique and safety/fall precautions with mobility using RW    AM-PAC 6 CLICK MOBILITY  Total Score:16     Patient left up in chair with all lines intact, call button in reach, RN notified and dtr present.    GOALS:   Multidisciplinary Problems     Physical Therapy Goals        Problem: Physical Therapy Goal    Goal Priority Disciplines Outcome Goal Variances Interventions   Physical Therapy Goal     PT, PT/OT      Description: 1. Patient will perform supine to/from sit mod indep  2. Patient will perform sit to/from stand with least AD sba no gross LOB  3. Patient will ambulate >200ft least AD sba no gross LOB                   History:     Past Medical History:   Diagnosis Date    Alcohol abuse     Cirrhosis     Depression     Hypertension     Portal hypertension with esophageal varices        Past Surgical History:   Procedure Laterality Date     SECTION      ESOPHAGOGASTRODUODENOSCOPY         Time Tracking:     PT Received On: 20  PT Start Time: 910     PT Stop Time: 950  PT Total Time (min): 40 min     Billable Minutes: Evaluation 15, Therapeutic Activity 15 and Therapeutic Exercise 10      Roman Bartholomew, PT  2020

## 2020-07-12 NOTE — PROGRESS NOTES
[facility]   Nephrology  Progress Note    Patient Name: John Wray  MRN: [id number]  Admission Date: 7/8/2020  Hospital Length of Stay: 3 days  Attending Provider: Yonas Red MD   Primary Care Physician: Primary Doctor No  Principal Problem:Decompensated HCV cirrhosis    Subjective:     HPI: 44 year old female with HTN, portal hypertension, EtOH abuse, liver cirrhosis, Hep C [place] [facility] with [place] GI bleed. Patient s/p blood transfusion with 2 units of PRBC in ER. Hgb was 3.8 following transfusion. She is receiving 3rd PRBC at present.  Patient also presents with SUNG and creatinine of 2.1 and metabolic acidosis.    Nephrology was consulted to help with patient's renal care while she is admitted at [facility]. I saw and examined patient in her hospital room. Patient reports that SOB and weakness have improved. She reports diarrhea yesterday. No nausea/vomiting. No pain at present, no LE edema. She made about 150 cc of urine overnight (recorded).     Chart review revealed creatinine of 0.8 on 4/15/19.     Interval History: Pt was evaluated this am in ICU. No new c/o's, no SOB, no abd pain. Met with pt's yopunger daughter today, advised refraining from drinking al;cohol altogether.    Review of patient's allergies indicates:  No Known Allergies  Current Facility-Administered Medications   Medication Frequency    cefTRIAXone (ROCEPHIN) 2 g/50 mL D5W IVPB Q24H    folic acid tablet 1 mg Daily    lactulose 20 gram/30 mL solution Soln 20 g TID    multivitamin tablet Daily    mupirocin 2 % ointment BID    nicotine 21 mg/24 hr 1 patch Daily    ondansetron injection 4 mg Q8H PRN    pantoprazole injection 40 mg BID    propranoloL tablet 20 mg BID    sodium chloride 0.9% flush 10 mL PRN    thiamine tablet 100 mg Daily       Objective:     Vital Signs (Most Recent):  Temp: 98.7 °F (37.1 °C) (07/12/20 1544)  Pulse: 70 (07/12/20 1749)  Resp: 18 (07/12/20 1544)  BP: (!) 143/76 (07/12/20 1544)  SpO2:  (!) 92 % (07/12/20 1544)  O2 Device (Oxygen Therapy): nasal cannula (07/12/20 0840) Vital Signs (24h Range):  Temp:  [96.8 °F (36 °C)-99.1 °F (37.3 °C)] 98.7 °F (37.1 °C)  Pulse:  [60-88] 70  Resp:  [14-44] 18  SpO2:  [90 %-100 %] 92 %  BP: (114-168)/(43-88) 143/76     Weight: 90.7 kg (199 lb 15.3 oz) (07/11/20 0600)  Body mass index is 31.32 kg/m².  Body surface area is 2.07 meters squared.    I/O last 3 completed shifts:  In: 1867.9 [I.V.:517.9; NG/GT:1350]  Out: 3710 [Urine:2710; Stool:1000]    Physical Exam  Vitals signs and nursing note reviewed.   Constitutional:       Appearance: Normal appearance.   HENT:      Head: Normocephalic and atraumatic.   Neck:      Musculoskeletal: Normal range of motion.   Cardiovascular:      Rate and Rhythm: Normal rate and regular rhythm.      Pulses: Normal pulses.      Heart sounds: Normal heart sounds.   Pulmonary:      Effort: Pulmonary effort is normal.      Breath sounds: Normal breath sounds. No rales.   Abdominal:      General: Abdomen is flat.      Palpations: Abdomen is soft.   Musculoskeletal:      Right lower leg: No edema.   Skin:     General: Skin is warm and dry.   Neurological:      General: No focal deficit present.      Mental Status: She is alert and oriented to person, place, and time.         Significant Labs: reviewed  BMP  Lab Results   Component Value Date     07/12/2020    K 3.4 (L) 07/12/2020     07/12/2020    CO2 22 (L) 07/12/2020    BUN 51 (H) 07/12/2020    CREATININE 2.0 (H) 07/12/2020    CALCIUM 9.5 07/12/2020    ANIONGAP 16 07/12/2020    ESTGFRAFRICA 34 (A) 07/12/2020    EGFRNONAA 30 (A) 07/12/2020     Lab Results   Component Value Date    WBC 7.98 07/12/2020    HGB 9.4 (L) 07/12/2020    HCT 27.8 (L) 07/12/2020    MCV 96 07/12/2020    PLT 42 (L) 07/12/2020           Significant Imaging: reviewed    Assessment/Plan:     43 y/o female with SUNG on CKD and hepatic decompensation:     Acute renal failure  SUNG on CKD stage 3, baseline s Cr  not known however.  s Cr stable, unchanged. Stable renal function  SUNG likely due to prerenal azotemia: hypotension, anemia, and diarrhea (resolved)  FENa <1, c/w prerenal azotemia.  K not high  Metabolic acidosis  UOP stable      H/o of hep C noted  U/a shows no proteinuria or hematuria, therefore hep C likely has not affected the kidneys.     Decompensated HCV cirrhosis  Has liver cirrhosis  Due to hep C and alcoholism  Decompensated, presented with GI bleed, likely has varices  Elevated bilirubin  Hypoalbuminemia  Hepatorenal syndrome is not unlikely, but is a diagnosis of exclusion  Will defer to GI     Pt's youngest daughter was at bedside today. Met with her and advised her to encourage her mother to rfefrain from drinking alcohol. She conform pt is an alcoholic.        Plans and recommendations:  As reviewed above  Total critical care time spent 30 minutes        Cole Lunsford MD  Nephrology  [facility]

## 2020-07-12 NOTE — ASSESSMENT & PLAN NOTE
Secondary to decompensated cirrhosis  Also with thrombocytopenia  No transfusion today  Daily INR  No active bleeding

## 2020-07-12 NOTE — SUBJECTIVE & OBJECTIVE
Review of Systems   Constitutional: Positive for malaise/fatigue. Negative for chills and fever.   HENT: Negative for congestion.    Eyes: Negative for blurred vision.   Respiratory: Negative for cough, sputum production and shortness of breath.    Cardiovascular: Negative for chest pain and leg swelling.   Gastrointestinal: Negative for abdominal pain, nausea and vomiting.   Genitourinary: Negative for dysuria.   Musculoskeletal: Negative for myalgias.   Skin: Negative for rash.   Neurological: Negative for dizziness, weakness and headaches.   Endo/Heme/Allergies: Does not bruise/bleed easily.   Psychiatric/Behavioral: The patient is not nervous/anxious.          Objective:     Vital Signs (Most Recent):  Temp: 98.6 °F (37 °C) (07/12/20 0705)  Pulse: 71 (07/12/20 0840)  Resp: 20 (07/12/20 0840)  BP: (!) 157/73 (07/12/20 0705)  SpO2: 98 % (07/12/20 0840) Vital Signs (24h Range):  Temp:  [98.2 °F (36.8 °C)-100.7 °F (38.2 °C)] 98.6 °F (37 °C)  Pulse:  [64-90] 71  Resp:  [7-44] 20  SpO2:  [90 %-100 %] 98 %  BP: (114-168)/(43-88) 157/73     Weight: 90.7 kg (199 lb 15.3 oz)  Body mass index is 31.32 kg/m².      Intake/Output Summary (Last 24 hours) at 7/12/2020 0905  Last data filed at 7/12/2020 0730  Gross per 24 hour   Intake 1210.83 ml   Output 2810 ml   Net -1599.17 ml       Physical Exam  Constitutional:       General: She is awake. She is not in acute distress.     Appearance: She is well-developed and overweight. She is ill-appearing. She is not toxic-appearing or diaphoretic.   HENT:      Head: Normocephalic and atraumatic.      Mouth/Throat:      Mouth: Mucous membranes are moist.   Eyes:      General: Lids are normal. Scleral icterus present.      Pupils: Pupils are equal, round, and reactive to light.   Neck:      Musculoskeletal: Full passive range of motion without pain.      Vascular: No carotid bruit.      Trachea: Trachea normal.   Cardiovascular:      Rate and Rhythm: Normal rate and regular rhythm.       Pulses:           Radial pulses are 2+ on the right side and 2+ on the left side.        Dorsalis pedis pulses are 1+ on the right side and 1+ on the left side.      Heart sounds: Normal heart sounds.   Pulmonary:      Effort: Pulmonary effort is normal. No tachypnea, accessory muscle usage or respiratory distress.      Breath sounds: Transmitted upper airway sounds present. Rales present.   Abdominal:      General: Bowel sounds are decreased. There is distension (mild ).      Palpations: Abdomen is soft.      Tenderness: There is no abdominal tenderness.   Genitourinary:     Comments: Alice[place] and rectal tube in place  Musculoskeletal: Normal range of motion.      Right lower le+ Edema present.      Left lower le+ Edema present.      Right foot: No deformity.      Left foot: No deformity.   Lymphadenopathy:      Cervical: No cervical adenopathy.   Skin:     General: Skin is warm and dry.      Capillary Refill: Capillary refill takes less than 2 seconds.      Findings: No rash.   Neurological:      Mental Status: She is alert and oriented to person, place, and time.      Motor: Motor function is intact.      Coordination: Coordination is intact.   Psychiatric:         Attention and Perception: Attention normal.         Mood and Affect: Mood normal.         Speech: Speech normal.         Behavior: Behavior is cooperative.         Thought Content: Thought content normal.         Cognition and Memory: Cognition normal.         Judgment: Judgment normal.         Vents:  Vent Mode: Spont (20)  Ventilator Initiated: Yes (07/10/20 1440)  Set Rate: 0 BPM (20)  Vt Set: 400 mL (20)  Pressure Support: 12 cmH20 (20)  PEEP/CPAP: 5 cmH20 (20)  Oxygen Concentration (%): 28 (20 0840)  Peak Airway Pressure: 18 cmH2O (20)  Plateau Pressure: 0 cmH20 (20)  Total Ve: 7.92 mL (20)  F/VT Ratio<105 (RSBI): (!) 37.53 (20  0759)    Lines/Drains/Airways     Drain                 Urethral Catheter 07/09/20 0130 Latex 16 Fr. 3 days         Rectal Tube 07/10/20 0950 rectal tube w/ balloon (indicate number of mLs) 1 day          Peripheral Intravenous Line                 Peripheral IV - Single Lumen 07/10/20 1048 20 G Right;Posterior;Medial Forearm 1 day         Peripheral IV - Single Lumen 07/10/20 1049 20 G Left;Posterior Forearm 1 day                Significant Labs:    CBC/Anemia Profile:  Recent Labs   Lab 07/10/20  1758  07/11/20  0445 07/11/20  1548 07/12/20  0444   WBC 8.63  8.63  --  7.55  --  7.98   HGB 7.7*  7.7*   < > 8.3*  8.3* 8.5* 9.1*  9.1*   HCT 23.5*  23.5*  --  24.4*  --  27.8*   PLT 53*  53*  --  47*  --  42*   MCV 96  96  --  94  --  96   RDW 23.9*  23.9*  --  22.8*  --  25.0*    < > = values in this interval not displayed.        Chemistries:  Recent Labs   Lab 07/11/20 0445 07/11/20  1548 07/12/20  0444     139 143 142  142   K 3.4*  3.4* 3.2* 3.5  3.5     105 106 108  108   CO2 21*  21* 20* 22*  22*   BUN 55*  54* 55* 54*  54*   CREATININE 2.2*  2.2* 2.0* 1.9*  1.9*   CALCIUM 9.0  9.2 9.1 9.0  9.0   ALBUMIN 2.4*  --  2.3*   PROT 8.4  --  8.5*   BILITOT 18.3*  --  17.8*   ALKPHOS 113  --  110   ALT 40  --  39   *  --  112*   MG 2.0 1.8 1.7       Coagulation:   Recent Labs   Lab 07/12/20  0444   INR 1.7*     All pertinent labs within the past 24 hours have been reviewed.    Significant Imaging:  CXR: I have reviewed all pertinent results/findings within the past 24 hours and my personal findings are:  improved aeration bilaterally

## 2020-07-12 NOTE — PLAN OF CARE
Bed mobility min A; transfers with RW and min A; amb 15ft RW min A for balance and safety; rec hhpt and family support

## 2020-07-12 NOTE — PROGRESS NOTES
[facility] Hospital Medicine  Progress Note    Patient Name: John Wray  MRN: [id number]  Patient Class: IP- Inpatient   Admission Date: 7/8/2020  Length of Stay: 3 days  Attending Physician: Yonas Red MD  Primary Care Provider: Primary Doctor No        Subjective:     Principal Problem:Decompensated HCV cirrhosis        HPI:  Ms. Wray is a 44-year-old  female with known history of liver cirrhosis from chronic alcohol abuse and hepatitis, presented to the ED complaining of worsening abdominal distention and shortness of breath the past few days.  Reports last alcoholic drink few weeks ago.  Patient is a very poor historian.  She also describes black tarry stools for the past few weeks, but has not sought medical attention until today.  Hemoglobin was undetectable upon initial arrival.  She received 1.5 unit of PRBC transfusion so far, repeat CBC reveals hemoglobin 3.8, hematocrit 12.9, WBC 07784, with platelets 12334.  INR 2.0, not on chronic anticoagulation.  Creatinine elevated at 1.6, CO2 8.  Total bilirubin 14.9, , ALT 44.  Ammonia 65.  BNP 28 29.  Troponin 0.092.  Stool Hemoccult test is positive.  Patient is hemodynamically stable, blood pressure 141/65.  Patient reports history of paracentesis long time ago.    Admitting diagnosis GI bleed with melena, severe symptomatic anemia, hemoglobin initially undetectable, now 3.8 after 1.5 unit PRBC transfusion.  Decompensated liver cirrhosis.    Overview/Hospital Course:  7/10: s/p 3 units pRBC, hgb still < 7. Will transfuse 1 more unit. Haptoglobin low concerned for hemolysis. Will repeat haptoglobin. Repeat PT/PTT. Continue FFP if needed. Plt improved after 1 units of plt given. Cont to monitor in ICU.   7/11 - Esophageal varices s/p banding. Intubated / Sedaetd. Discussed with cc team  7/12 - Tolerating Diet. Stable. Monitor     No new subjective & objective note has been filed under this hospital service since  the last note was generated.      Assessment/Plan:      * Decompensated HCV cirrhosis  Longstanding history of alcoholic liver disease and hepatitis.  Patient continues to drink alcohol, last drink few weeks ago (per patient).  GI consult.  Patient received Lasix 60 mg IV x1 in the ED.  Reports noncompliance with all medications, took medications many months ago.    7/10:  Bilirubin continue to trend up  abd u/s did not show CBD dilatation  GI on case  Patient continues to drink per report    7/11  GI  Cessation  Improving LFT's    Alcohol abuse  Plan for cessation  GI      7/12  Counseled on cessation  GI cleared for D/C - F/U in 1 week / EGD in 4 weeks      Encephalopathy, metabolic  7/10:  Ammonia level continue to be elevated  Patient refusing oral lactulose  Will continue lactulose rectally     7/12  Resolved  Monitor        Encephalopathy  Ammonia 65.  Patient is intermittently confused.  Will need lactulose after EGD.      Secondary esophageal varices with bleeding  GI bleed with melena.  Hemoglobin 3.8 after 1.5 unit PRBC transfusion.  Keep NPO.  GI consulted.  For possible EGD today.    7/10:  Hgb < 7 this am  Will transfuse 1 unit pRBC  GI consulted on case     7/11  S/p EGD  Banding  GI on board  CLD on extubation  XFuse as indicated    7/12  Stable  CLD      Acute blood loss anemia  Severe symptomatic anemia.  Hemoglobin was undetectable initially one in the ED presentation, currently 3.8 after 1.5 units of PRBC transfusion.  Transfused total of 3 units PRBC.  Repeat H&H and may need further transfusion after that.  Closely monitor in the ICU.  Patient denies hematemesis.    7/11  Stable  CLD on tolerance  GI on Consult    7/12  Hgb 9.1  GI on follow up      SUNG (acute kidney injury)  Serum creatinine elevated 1.6, likely secondary to gastrointestinal bleeding.  Transfuse 3 units PRBC total.  Repeat labs in a.m..  Consider nephrology consult.      Coagulopathy  INR 2.0, not on oral  anticoagulation.  Coagulopathy secondary to decompensated liver cirrhosis.  Vitamin K 10 mg IV x1.    7/10:  Continue to monitor   PT/PTT  FFP if needed   Fibrinogen > 100   Haptoglobin low, will repeat  Concern for hemolysis  Heme/onc on case     7/11  Heme Onc  Monitor  Transfuse as indicated        VTE Risk Mitigation (From admission, onward)         Ordered     IP VTE LOW RISK PATIENT  Once      07/10/20 1446     Place sequential compression device  Until discontinued      07/09/20 0000                      Yonas Red MD  Department of Hospital Medicine   [facility]

## 2020-07-12 NOTE — ASSESSMENT & PLAN NOTE
"45 y/o female with SUNG on CKD and hepatic decompensation:     Acute renal failure  SUNG on CKD stage 3, baseline s Cr not known however.  s Cr stable, unchanged. Stable renal function  SUNG likely due to prerenal azotemia: hypotension, anemia, and diarrhea x 1 pre-admit  FENa <15, c/w prerenal azotemia. (pt is a little "dry")  K not high  Metabolic acidosis  UOP stable      H/o of hep C noted  U/a shows no proteinuria or hematuria, therefore hep C likely has not affected the kidneys.     Decompensated HCV cirrhosis  Has liver cirrhosis  Due to hep C and alcoholism  Decompensated, presented with GI bleed, likely has varices  Elevated bilirubin  Hypoalbuminemia  Hepatorenal syndrome is not unlikely, but is a diagnosis of exclusion  Will defer to GI     Pt's oldest daughter was at bedside. Met with her and advised her to encourage her mother to rfefrain from drinking alcohol. She conform pt is an alcoholic.        Plans and recommendations:  As reviewed above  Total critical care time spent 30 minutes  "

## 2020-07-12 NOTE — PROGRESS NOTES
[facility]   Critical Care Medicine  Progress Note    Patient Name: John Wray  MRN: [id number]  Admission Date: 7/8/2020  Hospital Length of Stay: 3 days  Code Status: Full Code  Attending Provider: Yonas Red MD  Primary Care Provider: Primary Doctor No   Principal Problem: Decompensated HCV cirrhosis    Subjective:     HPI:  Ms Wray is a 43 yo obese BF with a PMH of daily etoh use, Cirrhosis, Ascites, MDD, Hep C, HTN and tobacco abuse.  She presented to the ED on 7/8 due to worsening abdominal distension and SOB. Pt also described dark tarry stools consistent with melena. Per ED, pt's hgb was undetectable on arrival so she received 1.5 units of packed RBCs, which brought her hbg up to 3.8 and hct to 12.9. The rest of pt's CBC showed WBC 04166 and plts 4900. INR 2, Cr 1.6, CO2 8, AG 23, Mg 1.1,Total bilirubin 14.9. AST/ALT ratio >2. Ammonia 65. BNP 2829. Troponin 0.092. Stool occult blood test positive. Pt was hemodynamically stable in the ED and was admitted to ICU for presumptive dx of GI bleed w/ melena, severe symptomatic anemia, and decompensated liver cirrhosis with GI consult requested.     Hospital/ICU Course:  7/9 - This AM patient fully awake, alert and responsive but very confused and restless at times requiring frequent redirection.  She is receiving PRBCs.  She had melena at home and BRBPR in ED but no rectal bleeding since admit overnight.  VSS and she is in no distress talking on cell phone with daughter.   7/10 - Restless and confused last 24 hours now sedated and sleeping post Ativan IVP overnight.  No distress.  No active bleeding still since admit.    7/11 - EGD done yesterday noted large esophageal varices with stigma of recent bleeding and some bleeding suspected on oralpharynx.  Left intubated on mech vent overnight and tolerated SAT/SBT this AM.  VSS and no distress.    7/12 - Much more alert and responsive this AM with orientation and no distress.    Review of  Systems   Constitutional: Positive for malaise/fatigue. Negative for chills and fever.   HENT: Negative for congestion.    Eyes: Negative for blurred vision.   Respiratory: Negative for cough, sputum production and shortness of breath.    Cardiovascular: Negative for chest pain and leg swelling.   Gastrointestinal: Negative for abdominal pain, nausea and vomiting.   Genitourinary: Negative for dysuria.   Musculoskeletal: Negative for myalgias.   Skin: Negative for rash.   Neurological: Negative for dizziness, weakness and headaches.   Endo/Heme/Allergies: Does not bruise/bleed easily.   Psychiatric/Behavioral: The patient is not nervous/anxious.          Objective:     Vital Signs (Most Recent):  Temp: 98.6 °F (37 °C) (07/12/20 0705)  Pulse: 71 (07/12/20 0840)  Resp: 20 (07/12/20 0840)  BP: (!) 157/73 (07/12/20 0705)  SpO2: 98 % (07/12/20 0840) Vital Signs (24h Range):  Temp:  [98.2 °F (36.8 °C)-100.7 °F (38.2 °C)] 98.6 °F (37 °C)  Pulse:  [64-90] 71  Resp:  [7-44] 20  SpO2:  [90 %-100 %] 98 %  BP: (114-168)/(43-88) 157/73     Weight: 90.7 kg (199 lb 15.3 oz)  Body mass index is 31.32 kg/m².      Intake/Output Summary (Last 24 hours) at 7/12/2020 0905  Last data filed at 7/12/2020 0730  Gross per 24 hour   Intake 1210.83 ml   Output 2810 ml   Net -1599.17 ml       Physical Exam  Constitutional:       General: She is awake. She is not in acute distress.     Appearance: She is well-developed and overweight. She is ill-appearing. She is not toxic-appearing or diaphoretic.   HENT:      Head: Normocephalic and atraumatic.      Mouth/Throat:      Mouth: Mucous membranes are moist.   Eyes:      General: Lids are normal. Scleral icterus present.      Pupils: Pupils are equal, round, and reactive to light.   Neck:      Musculoskeletal: Full passive range of motion without pain.      Vascular: No carotid bruit.      Trachea: Trachea normal.   Cardiovascular:      Rate and Rhythm: Normal rate and regular rhythm.      Pulses:            Radial pulses are 2+ on the right side and 2+ on the left side.        Dorsalis pedis pulses are 1+ on the right side and 1+ on the left side.      Heart sounds: Normal heart sounds.   Pulmonary:      Effort: Pulmonary effort is normal. No tachypnea, accessory muscle usage or respiratory distress.      Breath sounds: Transmitted upper airway sounds present. Rales present.   Abdominal:      General: Bowel sounds are decreased. There is distension (mild ).      Palpations: Abdomen is soft.      Tenderness: There is no abdominal tenderness.   Genitourinary:     Comments: Alicea and rectal tube in place  Musculoskeletal: Normal range of motion.      Right lower le+ Edema present.      Left lower le+ Edema present.      Right foot: No deformity.      Left foot: No deformity.   Lymphadenopathy:      Cervical: No cervical adenopathy.   Skin:     General: Skin is warm and dry.      Capillary Refill: Capillary refill takes less than 2 seconds.      Findings: No rash.   Neurological:      Mental Status: She is alert and oriented to person, place, and time.      Motor: Motor function is intact.      Coordination: Coordination is intact.   Psychiatric:         Attention and Perception: Attention normal.         Mood and Affect: Mood normal.         Speech: Speech normal.         Behavior: Behavior is cooperative.         Thought Content: Thought content normal.         Cognition and Memory: Cognition normal.         Judgment: Judgment normal.         Vents:  Vent Mode: Spont (20)  Ventilator Initiated: Yes (07/10/20 1440)  Set Rate: 0 BPM (20)  Vt Set: 400 mL (20)  Pressure Support: 12 cmH20 (20)  PEEP/CPAP: 5 cmH20 (20)  Oxygen Concentration (%): 28 (20 0840)  Peak Airway Pressure: 18 cmH2O (20)  Plateau Pressure: 0 cmH20 (20)  Total Ve: 7.92 mL (20)  F/VT Ratio<105 (RSBI): (!) 37.53 (20  0759)    Lines/Drains/Airways     Drain                 Urethral Catheter 07/09/20 0130 Latex 16 Fr. 3 days         Rectal Tube 07/10/20 0950 rectal tube w/ balloon (indicate number of mLs) 1 day          Peripheral Intravenous Line                 Peripheral IV - Single Lumen 07/10/20 1048 20 G Right;Posterior;Medial Forearm 1 day         Peripheral IV - Single Lumen 07/10/20 1049 20 G Left;Posterior Forearm 1 day                Significant Labs:    CBC/Anemia Profile:  Recent Labs   Lab 07/10/20  1758  07/11/20  0445 07/11/20  1548 07/12/20  0444   WBC 8.63  8.63  --  7.55  --  7.98   HGB 7.7*  7.7*   < > 8.3*  8.3* 8.5* 9.1*  9.1*   HCT 23.5*  23.5*  --  24.4*  --  27.8*   PLT 53*  53*  --  47*  --  42*   MCV 96  96  --  94  --  96   RDW 23.9*  23.9*  --  22.8*  --  25.0*    < > = values in this interval not displayed.        Chemistries:  Recent Labs   Lab 07/11/20 0445 07/11/20  1548 07/12/20  0444     139 143 142  142   K 3.4*  3.4* 3.2* 3.5  3.5     105 106 108  108   CO2 21*  21* 20* 22*  22*   BUN 55*  54* 55* 54*  54*   CREATININE 2.2*  2.2* 2.0* 1.9*  1.9*   CALCIUM 9.0  9.2 9.1 9.0  9.0   ALBUMIN 2.4*  --  2.3*   PROT 8.4  --  8.5*   BILITOT 18.3*  --  17.8*   ALKPHOS 113  --  110   ALT 40  --  39   *  --  112*   MG 2.0 1.8 1.7       Coagulation:   Recent Labs   Lab 07/12/20  0444   INR 1.7*     All pertinent labs within the past 24 hours have been reviewed.    Significant Imaging:  CXR: I have reviewed all pertinent results/findings within the past 24 hours and my personal findings are:  improved aeration bilaterally      ABG  Recent Labs   Lab 07/11/20  0442   PH 7.401   PO2 87   PCO2 33.7*   HCO3 20.9*   BE -4     Assessment/Plan:     Psychiatric  Alcohol abuse  Admit etoh < 10  Encourage etoh cessation with patient and she understands she must stop drinking to start transplant work up which will be needed eventually  Cont Folic Acid, MVI and  Thiamine    Pulmonary  Acute respiratory failure with hypoxia  Wean low flow NC O2    Renal/  Stage 3 chronic kidney disease  Renal following  Admitted with SUNG  Good UOP    Electrolyte imbalance  Replace K+ and Mg+  Daily labs  Cont cardiac monitoring    Hematology  Coagulopathy  Secondary to decompensated cirrhosis  Also with thrombocytopenia  No transfusion today  Daily INR  No active bleeding    Oncology  Acute blood loss anemia  Transfused last unit PRBC 7/10 for total of 6 this admit  Received Cryo as well  Repeat CBC daily  No active bleeding since admit  Hemodynamically stable  Follow H/H  Bleeding noted with EGD to oralpharynx and EV    GI  * Decompensated HCV cirrhosis  MELD score 29 this AM  Stop Octreotide (post 72 hours)  GI following:   EGD 7/10: Large (> 5 mm) varices were found in the mid esophagus and in the        distal esophagus. Four bands were successfully placed with        incomplete eradication of varices. There was no bleeding during the        maneuver.        Severe portal hypertensive gastropathy was found in the entire        examined stomach.        The examined duodenum was normal.        Bleeding with small clots.   Monitor NH3, INR and CMP daily.   Advance diet per GI  Repeat EGD in 4 weeks  Outpatient follow up with Dr. Spicer    Hepatic encephalopathy  Much improved today  Change Lactulose to PO  Daily NH3    Secondary esophageal varices with bleeding  Has had 6 units PRBCs since admit but none last 24 hours  Has also received FFP, Plt and Cryo  Daily CBC   No active bleeding since admit  Some bleeding noted in oralpharynx with EGD and EV recently bleeding  EGD 7/10: Large (> 5 mm) varices were found in the mid esophagus and in the        distal esophagus. Four bands were successfully placed with        incomplete eradication of varices. There was no bleeding during the        maneuver.        Severe portal hypertensive gastropathy was found in the entire        examined stomach.         The examined duodenum was normal.        Bleeding with small clots.           Preventive Measures and Monitoring:   Stress Ulcer: PPI  Nutrition: advance diet  Glucose control:  stable  Bowel prophylaxis: active GI bleed.  On Lactulose  DVT prophylaxis: INR 1.7 with recent GI bleed.  Has SCDs  Hx CAD on B-Blocker: none due to GI bleed  Head of Bed/Reposition: Elevate HOB and turn Q1-2 hours   Early Mobility:  OOB today  Alicea Day: #5  IVAB Day: #4  Code Status: Full  Pneumonia Vaccine: ordered     Counseling/Consultation:I have discussed the care of this patient in detail with the bedside nursing staff and Dr. Rondon and Dr. Red    Discussed with Dr. Red.  Will transfer out of ICU and will sign off.      Critical Care Time: 47 minutes  Critical secondary to Patient has a condition that poses threat to life and bodily function: Acute Renal Failure and acute Resp Failure   Patient has an abrupt change in neurologic status: Met Encephalopathy     Critical care was time spent personally by me on the following activities: development of treatment plan with patient or surrogate and bedside caregivers, discussions with consultants, evaluation of patient's response to treatment, examination of patient, ordering and performing treatments and interventions, ordering and review of laboratory studies, ordering and review of radiographic studies, pulse oximetry, re-evaluation of patient's condition. This critical care time did not overlap with that of any other provider or involve time for any procedures.     Chepe Bahena NP  Critical Care Medicine  [facility]

## 2020-07-12 NOTE — ASSESSMENT & PLAN NOTE
Admit etoh < 10  Encourage etoh cessation with patient and she understands she must stop drinking to start transplant work up which will be needed eventually

## 2020-07-12 NOTE — ASSESSMENT & PLAN NOTE
GI bleed with melena.  Hemoglobin 3.8 after 1.5 unit PRBC transfusion.  Keep NPO.  GI consulted.  For possible EGD today.    7/10:  Hgb < 7 this am  Will transfuse 1 unit pRBC  GI consulted on case     7/11  S/p EGD  Banding  GI on board  CLD on extubation  XFuse as indicated    7/12  Stable  CLD

## 2020-07-12 NOTE — ASSESSMENT & PLAN NOTE
Plan for cessation  GI      7/12  Counseled on cessation  GI cleared for D/C - F/U in 1 week / EGD in 4 weeks

## 2020-07-12 NOTE — PROGRESS NOTES
Home Oxygen Evaluation    Date Performed: 2020    1) Patient's Home O2 Sat on room air, while at rest: 92%        If O2 sats on room air at rest are 88% or below, patient qualifies. No additional testing needed. Document N/A in steps 2 and 3. If 89% or above, complete steps 2.      2) Patient's O2 Sat on room air while exercisin%        If O2 sats on room air while exercising remain 89% or above patient does not qualify, no further testing needed Document N/A in step 3. If O2 sats on room air while exercising are 88% or below, continue to step 3.      3) Patient's O2 Sat while exercising on O2: 93% at 2 LPM         (Must show improvement from #2 for patients to qualify)    If O2 sats improve on oxygen, patient qualifies for portable oxygen. If not, the patient does not qualify.

## 2020-07-12 NOTE — ASSESSMENT & PLAN NOTE
Longstanding history of alcoholic liver disease and hepatitis.  Patient continues to drink alcohol, last drink few weeks ago (per patient).  GI consult.  Patient received Lasix 60 mg IV x1 in the ED.  Reports noncompliance with all medications, took medications many months ago.    7/10:  Bilirubin continue to trend up  abd u/s did not show CBD dilatation  GI on case  Patient continues to drink per report    7/11  GI  Cessation  Improving LFT's

## 2020-07-12 NOTE — PLAN OF CARE
Patient currently resting quietly in bed. VS currently stable. Patient NSR on monitor at this time. Patient currently receiving oxygen via 3L nasal cannula. Patient currently on octreotide drip at this time. No signs of an active bleed at this time. Patient turned in bed every 2 hours to avoid skin breakdown to back side and prevent wound development. Patient turned with 2 nursing assist and positioned with pillows. No sign of wound development or skin breakdown noted. Plan of care updated with patient and family. There are no further questions after updated on plan of care at this time. No distress noted. Will continue to monitor.

## 2020-07-12 NOTE — ASSESSMENT & PLAN NOTE
GI bleed with melena.  Hemoglobin 3.8 after 1.5 unit PRBC transfusion.  Keep NPO.  GI consulted.  For possible EGD today.    7/10:  Hgb < 7 this am  Will transfuse 1 unit pRBC  GI consulted on case     7/11  S/p EGD  Banding  GI on board  CLD on extubation  XFuse as indicated

## 2020-07-12 NOTE — PROGRESS NOTES
[facility] Hospital Medicine  Progress Note    Patient Name: John Wray  MRN: [id number]  Patient Class: IP- Inpatient   Admission Date: 7/8/2020  Length of Stay: 3 days  Attending Physician: Yonas Red MD  Primary Care Provider: Primary Doctor No        Subjective:     Principal Problem:Decompensated HCV cirrhosis        HPI:  Ms. Wray is a 44-year-old  female with known history of liver cirrhosis from chronic alcohol abuse and hepatitis, presented to the ED complaining of worsening abdominal distention and shortness of breath the past few days.  Reports last alcoholic drink few weeks ago.  Patient is a very poor historian.  She also describes black tarry stools for the past few weeks, but has not sought medical attention until today.  Hemoglobin was undetectable upon initial arrival.  She received 1.5 unit of PRBC transfusion so far, repeat CBC reveals hemoglobin 3.8, hematocrit 12.9, WBC 65017, with platelets 84642.  INR 2.0, not on chronic anticoagulation.  Creatinine elevated at 1.6, CO2 8.  Total bilirubin 14.9, , ALT 44.  Ammonia 65.  BNP 28 29.  Troponin 0.092.  Stool Hemoccult test is positive.  Patient is hemodynamically stable, blood pressure 141/65.  Patient reports history of paracentesis long time ago.    Admitting diagnosis GI bleed with melena, severe symptomatic anemia, hemoglobin initially undetectable, now 3.8 after 1.5 unit PRBC transfusion.  Decompensated liver cirrhosis.    Overview/Hospital Course:  7/10: s/p 3 units pRBC, hgb still < 7. Will transfuse 1 more unit. Haptoglobin low concerned for hemolysis. Will repeat haptoglobin. Repeat PT/PTT. Continue FFP if needed. Plt improved after 1 units of plt given. Cont to monitor in ICU.   7/11 - Esophageal varices s/p banding. Intubated / Sedaetd. Discussed with cc team  7/12 - Tolerating Diet. Stable. Monitor     Interval History: Extubated - Tolerating CLD    Review of Systems   Constitutional:  Positive for activity change, appetite change and fatigue. Negative for unexpected weight change.   HENT: Negative.  Negative for trouble swallowing.    Eyes: Negative.    Respiratory: Negative.  Negative for cough, shortness of breath and wheezing.    Cardiovascular: Negative.  Negative for chest pain.   Gastrointestinal: Negative.    Endocrine: Negative.    Genitourinary: Negative.    Musculoskeletal: Negative.    Skin: Negative.    Allergic/Immunologic: Negative.    Neurological: Positive for weakness. Negative for dizziness.   Hematological: Negative.    Psychiatric/Behavioral: Negative.    All other systems reviewed and are negative.    Objective:     Vital Signs (Most Recent):  Temp: 96.8 °F (36 °C) (07/12/20 1048)  Pulse: 60 (07/12/20 1328)  Resp: 18 (07/12/20 1154)  BP: (!) 154/74 (07/12/20 1154)  SpO2: (!) 90 % (07/12/20 1154) Vital Signs (24h Range):  Temp:  [96.8 °F (36 °C)-99.1 °F (37.3 °C)] 96.8 °F (36 °C)  Pulse:  [60-88] 60  Resp:  [14-44] 18  SpO2:  [90 %-100 %] 90 %  BP: (114-168)/(43-88) 154/74     Weight: 90.7 kg (199 lb 15.3 oz)  Body mass index is 31.32 kg/m².    Intake/Output Summary (Last 24 hours) at 7/12/2020 1508  Last data filed at 7/12/2020 0945  Gross per 24 hour   Intake 1500.83 ml   Output 1650 ml   Net -149.17 ml      Physical Exam  Constitutional:       General: She is not in acute distress.     Appearance: She is well-developed. She is ill-appearing and toxic-appearing. She is not diaphoretic.   HENT:      Head: Normocephalic and atraumatic.      Mouth/Throat:      Mouth: Mucous membranes are dry.   Eyes:      General: Scleral icterus present.      Pupils: Pupils are equal, round, and reactive to light.   Cardiovascular:      Rate and Rhythm: Normal rate and regular rhythm.      Heart sounds: Normal heart sounds. No murmur. No friction rub. No gallop.    Pulmonary:      Effort: Pulmonary effort is normal. No respiratory distress.      Breath sounds: Normal breath sounds. No stridor.  No wheezing or rales.   Abdominal:      General: Bowel sounds are normal. There is no distension.      Palpations: Abdomen is soft. There is no mass.      Tenderness: There is no abdominal tenderness. There is no guarding.   Musculoskeletal:      Right lower leg: Edema present.      Left lower leg: Edema present.   Skin:     General: Skin is warm.      Capillary Refill: Capillary refill takes 2 to 3 seconds.      Coloration: Skin is jaundiced.      Findings: No erythema.   Neurological:      Motor: Weakness present.      Comments: disoriented   Psychiatric:         Mood and Affect: Mood normal.         Behavior: Behavior normal.         Significant Labs:   BMP:   Recent Labs   Lab 07/12/20  0444   GLU 94  94     142   K 3.5  3.5     108   CO2 22*  22*   BUN 54*  54*   CREATININE 1.9*  1.9*   CALCIUM 9.0  9.0   MG 1.7     CBC:   Recent Labs   Lab 07/10/20  1758  07/11/20  0445 07/11/20  1548 07/12/20  0444   WBC 8.63  8.63  --  7.55  --  7.98   HGB 7.7*  7.7*   < > 8.3*  8.3* 8.5* 9.1*  9.1*   HCT 23.5*  23.5*  --  24.4*  --  27.8*   PLT 53*  53*  --  47*  --  42*    < > = values in this interval not displayed.     CMP:   Recent Labs   Lab 07/11/20  0445 07/11/20  1548 07/12/20  0444     139 143 142  142   K 3.4*  3.4* 3.2* 3.5  3.5     105 106 108  108   CO2 21*  21* 20* 22*  22*   GLU 93  93 90 94  94   BUN 55*  54* 55* 54*  54*   CREATININE 2.2*  2.2* 2.0* 1.9*  1.9*   CALCIUM 9.0  9.2 9.1 9.0  9.0   PROT 8.4  --  8.5*   ALBUMIN 2.4*  --  2.3*   BILITOT 18.3*  --  17.8*   ALKPHOS 113  --  110   *  --  112*   ALT 40  --  39   ANIONGAP 13  13 17* 12  12   EGFRNONAA 27*  27* 30* 32*  32*     All pertinent labs within the past 24 hours have been reviewed.    Significant Imaging: I have reviewed all pertinent imaging results/findings within the past 24 hours.      Assessment/Plan:      * Decompensated HCV cirrhosis  Longstanding history of alcoholic liver  disease and hepatitis.  Patient continues to drink alcohol, last drink few weeks ago (per patient).  GI consult.  Patient received Lasix 60 mg IV x1 in the ED.  Reports noncompliance with all medications, took medications many months ago.    7/10:  Bilirubin continue to trend up  abd u/s did not show CBD dilatation  GI on case  Patient continues to drink per report    7/11  GI  Cessation  Improving LFT's    Alcohol abuse  Plan for cessation  GI      7/12  Counseled on cessation  GI cleared for D/C - F/U in 1 week / EGD in 4 weeks      Encephalopathy, metabolic  7/10:  Ammonia level continue to be elevated  Patient refusing oral lactulose  Will continue lactulose rectally     7/12  Resolved  Monitor        Encephalopathy  Ammonia 65.  Patient is intermittently confused.  Will need lactulose after EGD.      Secondary esophageal varices with bleeding  GI bleed with melena.  Hemoglobin 3.8 after 1.5 unit PRBC transfusion.  Keep NPO.  GI consulted.  For possible EGD today.    7/10:  Hgb < 7 this am  Will transfuse 1 unit pRBC  GI consulted on case     7/11  S/p EGD  Banding  GI on board  CLD on extubation  XFuse as indicated    7/12  Stable  CLD      Acute blood loss anemia  Severe symptomatic anemia.  Hemoglobin was undetectable initially one in the ED presentation, currently 3.8 after 1.5 units of PRBC transfusion.  Transfused total of 3 units PRBC.  Repeat H&H and may need further transfusion after that.  Closely monitor in the ICU.  Patient denies hematemesis.    7/11  Stable  CLD on tolerance  GI on Consult    7/12  Hgb 9.1  GI on follow up      SUNG (acute kidney injury)  Serum creatinine elevated 1.6, likely secondary to gastrointestinal bleeding.  Transfuse 3 units PRBC total.  Repeat labs in a.m..  Consider nephrology consult.      Coagulopathy  INR 2.0, not on oral anticoagulation.  Coagulopathy secondary to decompensated liver cirrhosis.  Vitamin K 10 mg IV x1.    7/10:  Continue to monitor   PT/PTT  FFP  if needed   Fibrinogen > 100   Haptoglobin low, will repeat  Concern for hemolysis  Heme/onc on case     7/11  Heme Onc  Monitor  Transfuse as indicated        VTE Risk Mitigation (From admission, onward)         Ordered     IP VTE LOW RISK PATIENT  Once      07/10/20 1446     Place sequential compression device  Until discontinued      07/09/20 0000                      Yonas Red MD  Department of Hospital Medicine   [facility]

## 2020-07-12 NOTE — CONSULTS
[facility]   Gastroenterology  Consult Note    Patient Name: John Wray  MRN: [id number]  Admission Date: 7/8/2020  Hospital Length of Stay: 3 days  Code Status: Full Code   Attending Provider: Yonas Red MD   Consulting Provider: Edwige Spicer MD  Primary Care Physician: Primary Doctor No  Principal Problem:Decompensated HCV cirrhosis    Consults  Subjective:     HPI:  The patient presented to the ER with weakness and SOB. The patient is currently confused and unable to provide any history. Her history has been obtained from the medical record and medical staff. Review of Care Everywhere shows a visit to [facility] on 08/27/19 for alcoholic cirrhosis with h/o esophageal varices, ascites and mild encephalopathy. She had positive HAV IgG and negative HCV RNA. HBV status unknown. 04/2019 US showed small ascites and CT scan showed diffuse fatty infiltration of liver with hepatosplenomegaly and enlarged gastric varices consistent with portal hypertension, and cholelithiasis. Baseline Hgb around 9. LFTs were similar to this admit with T. Bili over 10 and INR below 2.   This admit, work up revealed at Hgb of 3.8 after two units of blood. She is getting a third unit now. She also got a unit of FFP overnight. The nurse said the ER reported dark red stools overnight. No BM since getting to the unit. Her INR is 2.0. LFT elevated. CBC elevated. Afebrile. No significant tachycardia and BP has been primarily normal to elevated. UA unremarkable. CXR showed possible process on the right. Nurse reports no complaints of pain from patient and no vomiting. There are reports she is still drinking alcohol.       Subjective:     Interval History: Patient is alert today and appropriately responsive. She denies any acute issues.    Review of Systems   Constitutional: Negative.    HENT: Negative.    Eyes: Negative.    Respiratory: Negative.    Cardiovascular: Negative.    Gastrointestinal: Negative.     Genitourinary: Negative.    Musculoskeletal: Negative.    Skin: Negative.    Neurological: Negative.    Psychiatric/Behavioral: Negative.      Objective:     Vital Signs (Most Recent):  Temp: 96.8 °F (36 °C) (07/12/20 1048)  Pulse: 88 (07/12/20 1048)  Resp: 18 (07/12/20 1048)  BP: (!) 161/74 (07/12/20 1048)  SpO2: 98 % (07/12/20 1048) Vital Signs (24h Range):  Temp:  [96.8 °F (36 °C)-99.7 °F (37.6 °C)] 96.8 °F (36 °C)  Pulse:  [64-90] 88  Resp:  [14-44] 18  SpO2:  [90 %-100 %] 98 %  BP: (114-168)/(43-88) 161/74     Weight: 90.7 kg (199 lb 15.3 oz) (07/11/20 0600)  Body mass index is 31.32 kg/m².      Intake/Output Summary (Last 24 hours) at 7/12/2020 1128  Last data filed at 7/12/2020 0945  Gross per 24 hour   Intake 1690.83 ml   Output 2645 ml   Net -954.17 ml       Lines/Drains/Airways     Peripheral Intravenous Line                 Peripheral IV - Single Lumen 07/10/20 1048 20 G Right;Posterior;Medial Forearm 2 days         Peripheral IV - Single Lumen 07/10/20 1049 20 G Left;Posterior Forearm 2 days                Physical Exam  Vitals signs reviewed.   Constitutional:       General: She is not in acute distress.     Appearance: She is well-developed.   HENT:      Head: Normocephalic and atraumatic.      Mouth/Throat:      Pharynx: No oropharyngeal exudate.   Eyes:      General: Scleral icterus present.         Right eye: No discharge.         Left eye: No discharge.      Pupils: Pupils are equal, round, and reactive to light.   Pulmonary:      Effort: Pulmonary effort is normal. No respiratory distress.      Breath sounds: No wheezing.      Comments: Coarse BS  Abdominal:      General: There is no distension.      Palpations: Abdomen is soft.      Tenderness: There is no abdominal tenderness.   Neurological:      Mental Status: She is alert and oriented to person, place, and time.   Psychiatric:         Behavior: Behavior normal.         Significant Labs:  CBC:   Recent Labs   Lab 07/10/20  1256   07/11/20  0445 07/11/20  1548 07/12/20  0444   WBC 8.63  8.63  --  7.55  --  7.98   HGB 7.7*  7.7*   < > 8.3*  8.3* 8.5* 9.1*  9.1*   HCT 23.5*  23.5*  --  24.4*  --  27.8*   PLT 53*  53*  --  47*  --  42*    < > = values in this interval not displayed.     CMP:   Recent Labs   Lab 07/12/20  0444   GLU 94  94   CALCIUM 9.0  9.0   ALBUMIN 2.3*   PROT 8.5*     142   K 3.5  3.5   CO2 22*  22*     108   BUN 54*  54*   CREATININE 1.9*  1.9*   ALKPHOS 110   ALT 39   *   BILITOT 17.8*     Coagulation:   Recent Labs   Lab 07/12/20  0444   INR 1.7*         Significant Imaging:  Imaging results within the past 24 hours have been reviewed.    Assessment/Plan:     * Decompensated HCV cirrhosis  Patient clinically improved. But given high MELD prognosis guarded but this will have to be watched as an outpatient      MELD-Na score: 29 at 7/12/2020  4:44 AM  MELD score: 29 at 7/12/2020  4:44 AM  Calculated from:  Serum Creatinine: 1.9 mg/dL at 7/12/2020  4:44 AM  Serum Sodium: 142 mmol/L (Rounded to 137 mmol/L) at 7/12/2020  4:44 AM  Total Bilirubin: 17.8 mg/dL at 7/12/2020  4:44 AM  INR(ratio): 1.7 at 7/12/2020  4:44 AM  Age: 44 years 6 months        Alcohol abuse  Patient would like information about rehab   -SW consult placed    Encephalopathy, metabolic  Much improved  -continue with lactulose      Secondary esophageal varices with bleeding  Hemoglobin now stable after banding. Octreotide completed.  -Propranolol started for secondary variceal protection  -Message sent to get patient outpatient about in 1 week from discharge  -Will need repeat EGD in 4 weeks        Thank you for your consult. I will sign off. Please contact us if you have any additional questions.    Edwige Spicer MD  Gastroenterology  [facility]

## 2020-07-12 NOTE — SUBJECTIVE & OBJECTIVE
Interval History: Intubated / Sedated    Review of Systems   Unable to perform ROS: Intubated     Objective:     Vital Signs (Most Recent):  Temp: 99.1 °F (37.3 °C) (07/11/20 1905)  Pulse: 88 (07/11/20 2105)  Resp: (!) 44 (07/11/20 2100)  BP: 124/61 (07/11/20 2100)  SpO2: (!) 94 % (07/11/20 2100) Vital Signs (24h Range):  Temp:  [98.4 °F (36.9 °C)-100.7 °F (38.2 °C)] 99.1 °F (37.3 °C)  Pulse:  [60-90] 88  Resp:  [7-44] 44  SpO2:  [92 %-100 %] 94 %  BP: ()/(29-80) 124/61     Weight: 90.7 kg (199 lb 15.3 oz)  Body mass index is 31.32 kg/m².    Intake/Output Summary (Last 24 hours) at 7/11/2020 2137  Last data filed at 7/11/2020 2100  Gross per 24 hour   Intake 1144.37 ml   Output 3200 ml   Net -2055.63 ml      Physical Exam  Constitutional:       General: She is not in acute distress.     Appearance: She is well-developed. She is ill-appearing and toxic-appearing. She is not diaphoretic.   HENT:      Head: Normocephalic and atraumatic.      Mouth/Throat:      Mouth: Mucous membranes are dry.   Eyes:      General: Scleral icterus present.      Pupils: Pupils are equal, round, and reactive to light.   Cardiovascular:      Rate and Rhythm: Normal rate and regular rhythm.      Heart sounds: Normal heart sounds. No murmur. No friction rub. No gallop.    Pulmonary:      Effort: Pulmonary effort is normal. No respiratory distress.      Breath sounds: Normal breath sounds. No stridor. No wheezing or rales.   Abdominal:      General: Bowel sounds are normal. There is no distension.      Palpations: Abdomen is soft. There is no mass.      Tenderness: There is no abdominal tenderness. There is no guarding.   Musculoskeletal:      Right lower leg: Edema present.      Left lower leg: Edema present.   Skin:     General: Skin is warm.      Capillary Refill: Capillary refill takes 2 to 3 seconds.      Coloration: Skin is jaundiced.      Findings: No erythema.   Neurological:      Motor: Weakness present.      Comments:  disoriented         Significant Labs:   BMP:   Recent Labs   Lab 07/11/20  1548   GLU 90      K 3.2*      CO2 20*   BUN 55*   CREATININE 2.0*   CALCIUM 9.1   MG 1.8     CBC:   Recent Labs   Lab 07/10/20  0359  07/10/20  1758 07/11/20  0120 07/11/20 0445 07/11/20  1548   WBC 8.86  --  8.63  8.63  --  7.55  --    HGB 6.7*   < > 7.7*  7.7* 8.5* 8.3*  8.3* 8.5*   HCT 20.6*  --  23.5*  23.5*  --  24.4*  --    PLT 59*  --  53*  53*  --  47*  --     < > = values in this interval not displayed.     CMP:   Recent Labs   Lab 07/10/20  0359 07/11/20  0120 07/11/20 0445 07/11/20  1548     137   < > 138 139  139 143   K 4.0  3.9   < > 3.3* 3.4*  3.4* 3.2*     102   < > 104 105  105 106   CO2 20*  20*   < > 21* 21*  21* 20*   *  114*   < > 105 93  93 90   BUN 43*  43*   < > 52* 55*  54* 55*   CREATININE 2.4*  2.5*   < > 2.2* 2.2*  2.2* 2.0*   CALCIUM 9.2  9.5   < > 9.0 9.0  9.2 9.1   PROT 9.2*  --   --  8.4  --    ALBUMIN 2.8*  --   --  2.4*  --    BILITOT 19.8*  --   --  18.3*  --    ALKPHOS 122  --   --  113  --    *  --   --  144*  --    ALT 44  --   --  40  --    ANIONGAP 14  15   < > 13 13  13 17*   EGFRNONAA 24*  23*   < > 27* 27*  27* 30*    < > = values in this interval not displayed.     All pertinent labs within the past 24 hours have been reviewed.    Significant Imaging: I have reviewed all pertinent imaging results/findings within the past 24 hours.

## 2020-07-12 NOTE — PLAN OF CARE
Attempted to address rehab referral; however, daughter requesting a return call/visit as pt has to use the restroom.

## 2020-07-12 NOTE — ASSESSMENT & PLAN NOTE
Severe symptomatic anemia.  Hemoglobin was undetectable initially one in the ED presentation, currently 3.8 after 1.5 units of PRBC transfusion.  Transfused total of 3 units PRBC.  Repeat H&H and may need further transfusion after that.  Closely monitor in the ICU.  Patient denies hematemesis.    7/11  Stable  CLD on tolerance  GI on Consult    7/12  Hgb 9.1  GI on follow up

## 2020-07-12 NOTE — NURSING
1045 Pt transfer to Telemetry room 216 via wheelchair. Pt able to ambulate with PT assist x1. All pt personal belongings transfer with pt. Pt [email or web address]. Bedside report given to RAFAELA Phelps. Assist pt to commode with assist.

## 2020-07-12 NOTE — SUBJECTIVE & OBJECTIVE
Interval History: Pt was evaluated this am in ICU. No new c/o's, no SOB, no abd pain. Met with pt's yopunger daughter today, advised refraining from drinking al;cohol altogether.    Review of patient's allergies indicates:  No Known Allergies  Current Facility-Administered Medications   Medication Frequency    cefTRIAXone (ROCEPHIN) 2 g/50 mL D5W IVPB Q24H    folic acid tablet 1 mg Daily    lactulose 20 gram/30 mL solution Soln 20 g TID    multivitamin tablet Daily    mupirocin 2 % ointment BID    nicotine 21 mg/24 hr 1 patch Daily    ondansetron injection 4 mg Q8H PRN    pantoprazole injection 40 mg BID    propranoloL tablet 20 mg BID    sodium chloride 0.9% flush 10 mL PRN    thiamine tablet 100 mg Daily       Objective:     Vital Signs (Most Recent):  Temp: 98.7 °F (37.1 °C) (07/12/20 1544)  Pulse: 70 (07/12/20 1749)  Resp: 18 (07/12/20 1544)  BP: (!) 143/76 (07/12/20 1544)  SpO2: (!) 92 % (07/12/20 1544)  O2 Device (Oxygen Therapy): nasal cannula (07/12/20 0840) Vital Signs (24h Range):  Temp:  [96.8 °F (36 °C)-99.1 °F (37.3 °C)] 98.7 °F (37.1 °C)  Pulse:  [60-88] 70  Resp:  [14-44] 18  SpO2:  [90 %-100 %] 92 %  BP: (114-168)/(43-88) 143/76     Weight: 90.7 kg (199 lb 15.3 oz) (07/11/20 0600)  Body mass index is 31.32 kg/m².  Body surface area is 2.07 meters squared.    I/O last 3 completed shifts:  In: 1867.9 [I.V.:517.9; NG/GT:1350]  Out: 3710 [Urine:2710; Stool:1000]    Physical Exam  Vitals signs and nursing note reviewed.   Constitutional:       Appearance: Normal appearance.   HENT:      Head: Normocephalic and atraumatic.   Neck:      Musculoskeletal: Normal range of motion.   Cardiovascular:      Rate and Rhythm: Normal rate and regular rhythm.      Pulses: Normal pulses.      Heart sounds: Normal heart sounds.   Pulmonary:      Effort: Pulmonary effort is normal.      Breath sounds: Normal breath sounds. No rales.   Abdominal:      General: Abdomen is flat.      Palpations: Abdomen is soft.    Musculoskeletal:      Right lower leg: No edema.   Skin:     General: Skin is warm and dry.   Neurological:      General: No focal deficit present.      Mental Status: She is alert and oriented to person, place, and time.         Significant Labs: reviewed  BMP  Lab Results   Component Value Date     07/12/2020    K 3.4 (L) 07/12/2020     07/12/2020    CO2 22 (L) 07/12/2020    BUN 51 (H) 07/12/2020    CREATININE 2.0 (H) 07/12/2020    CALCIUM 9.5 07/12/2020    ANIONGAP 16 07/12/2020    ESTGFRAFRICA 34 (A) 07/12/2020    EGFRNONAA 30 (A) 07/12/2020     Lab Results   Component Value Date    WBC 7.98 07/12/2020    HGB 9.4 (L) 07/12/2020    HCT 27.8 (L) 07/12/2020    MCV 96 07/12/2020    PLT 42 (L) 07/12/2020           Significant Imaging: reviewed

## 2020-07-12 NOTE — PT/OT/SLP EVAL
Occupational Therapy   Evaluation    Name: John Wray  MRN: [id number]  Admitting Diagnosis:  Decompensated HCV cirrhosis 2 Days Post-Op    Recommendations:     Discharge Recommendations: home health OT  Discharge Equipment Recommendations:     Barriers to discharge:       Assessment:     John Wray is a 44 y.o. female with a medical diagnosis of Decompensated HCV cirrhosis.  She presents with DEBILITY AND GENERALIZED WEAKNESS. Performance deficits affecting function: weakness, impaired functional mobilty, impaired endurance, gait instability, impaired balance, impaired self care skills.      Rehab Prognosis: Good; patient would benefit from acute skilled OT services to address these deficits and reach maximum level of function.       Plan:     Patient to be seen 3 x/week to address the above listed problems via self-care/home management, therapeutic activities, therapeutic exercises  · Plan of Care Expires: 07/19/20  · Plan of Care Reviewed with:      Subjective     Chief Complaint: DEBILITY AND GENERALIZED WEAKNESS  Living Environment:  LIVES WITH DAUGHTER IN MOBILE HOME WITH 5 STEPS TO ENTER   Previous level of function:(I) WITH ADL'S AND MOD (I) WITH FUNCTIONAL MOBILITY  Roles and Routines: OCCUPATIONAL THERAPY  Equipment Used at Home:  bedside commode, walker, rolling  Assistance upon Discharge:     Pain/Comfort:  · Pain Rating 1: 0/10    Patients cultural, spiritual, Jehovah's witness conflicts given the current situation:      Objective:     Communicated with: NURSE AND Epic CHART REVIEW prior to session.  Patient found up in chair with   upon OT entry to room.    General Precautions: Standard, fall   Orthopedic Precautions:N/A   Braces: N/A     Occupational Performance:    Bed Mobility:    · Patient completed Rolling/Turning to Left with  minimum assistance  · Patient completed Scooting/Bridging with minimum assistance  · Patient completed Supine to Sit with minimum assistance    Functional  Mobility/Transfers:  · Patient completed Sit <> Stand Transfer with minimum assistance  with  rolling walker   · Patient completed Bed <> Chair Transfer using Step Transfer technique with minimum assistance with rolling walker  · Functional Mobility: PT AMBULATED 10 FEET WITH MIN A AND ROLLING WALKER    Activities of Daily Living:  · Grooming: minimum assistance FACE WASHING  · Upper Body Dressing: moderate assistance [facility]  · Lower Body Dressing: moderate assistance [facility] SOCKS  · Toileting: moderate assistance .    Cognitive/Visual Perceptual:  Cognitive/Psychosocial Skills:     -       Oriented to: Person, Place, Time and Situation   -       Follows Commands/attention:Follows multistep  commands  -       Communication: clear/fluent  -       Memory: No Deficits noted  -       Safety awareness/insight to disability: impaired     Physical Exam:  Upper Extremity Range of Motion:     -       Right Upper Extremity: WFL  -       Left Upper Extremity: WFL  Upper Extremity Strength:    -       Right Upper Extremity: MMT: 3/5 GROSSLY  -       Left Upper Extremity: MMT: 3/5 GROSSLY   Strength:    -       Right Upper Extremity: MMT: 3/5 GROSSLY  -       Left Upper Extremity: MMT: 3/5 GROSSLY    AMPAC 6 Click ADL:  AMPAC Total Score: 19    Treatment & Education:  PT SEEN IN ROOM SOON BE TRANSFERRED TO TELEMENTERY. PT REQ MOD A WITH ADL'S TOILETING, UE/LE DRESSING, GROOMING /HYGIENE) EOB Education:  AND MIN A WITH FUNCTIONAL T/F'S AS WELL AS MOBILITY. SEE ABOVE FOR DETAILS  Patient left SITTING W/C BEING TRANFERRED with all lines intact, call button in reach,  NURSE SARAY. notified and TECH, NURSE SARAY, AND DAUGHTER present    GOALS:   Multidisciplinary Problems     Occupational Therapy Goals        Problem: Occupational Therapy Goal    Goal Priority Disciplines Outcome Interventions   Occupational Therapy Goal     OT, PT/OT     Description: OT GOALS TO BE MET BY 7-19-20  S WITH UE DRESSING  S WITH  LE DRESSING  PT WILL TOLERATE 1 SET X 15 REPS B UE ROM EXERCISE WITH MIN RESISTANCE  S WITH G/H STANDING AT SINK SIDE WITH FAIR+ STANDING BALANCE                   History:     Past Medical History:   Diagnosis Date    Alcohol abuse     Cirrhosis     Depression     Hypertension     Portal hypertension with esophageal varices        Past Surgical History:   Procedure Laterality Date     SECTION      ESOPHAGOGASTRODUODENOSCOPY         Time Tracking:     OT Date of Treatment: 20  OT Start Time: 931  OT Stop Time: 955  OT Total Time (min): 24 min    Billable Minutes:Evaluation 10 MINUTES  Self Care/Home Management 14 MINUTES    Catarina Luz, OT  2020

## 2020-07-12 NOTE — SUBJECTIVE & OBJECTIVE
Subjective:     Interval History: Patient is alert today and appropriately responsive. She denies any acute issues.    Review of Systems   Constitutional: Negative.    HENT: Negative.    Eyes: Negative.    Respiratory: Negative.    Cardiovascular: Negative.    Gastrointestinal: Negative.    Genitourinary: Negative.    Musculoskeletal: Negative.    Skin: Negative.    Neurological: Negative.    Psychiatric/Behavioral: Negative.      Objective:     Vital Signs (Most Recent):  Temp: 96.8 °F (36 °C) (07/12/20 1048)  Pulse: 88 (07/12/20 1048)  Resp: 18 (07/12/20 1048)  BP: (!) 161/74 (07/12/20 1048)  SpO2: 98 % (07/12/20 1048) Vital Signs (24h Range):  Temp:  [96.8 °F (36 °C)-99.7 °F (37.6 °C)] 96.8 °F (36 °C)  Pulse:  [64-90] 88  Resp:  [14-44] 18  SpO2:  [90 %-100 %] 98 %  BP: (114-168)/(43-88) 161/74     Weight: 90.7 kg (199 lb 15.3 oz) (07/11/20 0600)  Body mass index is 31.32 kg/m².      Intake/Output Summary (Last 24 hours) at 7/12/2020 1128  Last data filed at 7/12/2020 0945  Gross per 24 hour   Intake 1690.83 ml   Output 2645 ml   Net -954.17 ml       Lines/Drains/Airways     Peripheral Intravenous Line                 Peripheral IV - Single Lumen 07/10/20 1048 20 G Right;Posterior;Medial Forearm 2 days         Peripheral IV - Single Lumen 07/10/20 1049 20 G Left;Posterior Forearm 2 days                Physical Exam  Vitals signs reviewed.   Constitutional:       General: She is not in acute distress.     Appearance: She is well-developed.   HENT:      Head: Normocephalic and atraumatic.      Mouth/Throat:      Pharynx: No oropharyngeal exudate.   Eyes:      General: Scleral icterus present.         Right eye: No discharge.         Left eye: No discharge.      Pupils: Pupils are equal, round, and reactive to light.   Pulmonary:      Effort: Pulmonary effort is normal. No respiratory distress.      Breath sounds: No wheezing.      Comments: Coarse BS  Abdominal:      General: There is no distension.       Palpations: Abdomen is soft.      Tenderness: There is no abdominal tenderness.   Neurological:      Mental Status: She is alert and oriented to person, place, and time.   Psychiatric:         Behavior: Behavior normal.         Significant Labs:  CBC:   Recent Labs   Lab 07/10/20  1758  07/11/20  0445 07/11/20  1548 07/12/20  0444   WBC 8.63  8.63  --  7.55  --  7.98   HGB 7.7*  7.7*   < > 8.3*  8.3* 8.5* 9.1*  9.1*   HCT 23.5*  23.5*  --  24.4*  --  27.8*   PLT 53*  53*  --  47*  --  42*    < > = values in this interval not displayed.     CMP:   Recent Labs   Lab 07/12/20 0444   GLU 94  94   CALCIUM 9.0  9.0   ALBUMIN 2.3*   PROT 8.5*     142   K 3.5  3.5   CO2 22*  22*     108   BUN 54*  54*   CREATININE 1.9*  1.9*   ALKPHOS 110   ALT 39   *   BILITOT 17.8*     Coagulation:   Recent Labs   Lab 07/12/20 0444   INR 1.7*         Significant Imaging:  Imaging results within the past 24 hours have been reviewed.

## 2020-07-12 NOTE — PLAN OF CARE
Patient awake, alert and oriented x 3 act confused at times  She remains calm, cooperative.  Patient able to make needs known.   Needs met by staff for shift.  Patient denies pain at this time.   PRN pain meds administered per MAR.  IV site patent and intact, no redness.  No other complaints voiced at this time.   Continue on telemetry monitoring.

## 2020-07-12 NOTE — SUBJECTIVE & OBJECTIVE
Interval History: Extubated - Tolerating CLD    Review of Systems   Constitutional: Positive for activity change, appetite change and fatigue. Negative for unexpected weight change.   HENT: Negative.  Negative for trouble swallowing.    Eyes: Negative.    Respiratory: Negative.  Negative for cough, shortness of breath and wheezing.    Cardiovascular: Negative.  Negative for chest pain.   Gastrointestinal: Negative.    Endocrine: Negative.    Genitourinary: Negative.    Musculoskeletal: Negative.    Skin: Negative.    Allergic/Immunologic: Negative.    Neurological: Positive for weakness. Negative for dizziness.   Hematological: Negative.    Psychiatric/Behavioral: Negative.    All other systems reviewed and are negative.    Objective:     Vital Signs (Most Recent):  Temp: 96.8 °F (36 °C) (07/12/20 1048)  Pulse: 60 (07/12/20 1328)  Resp: 18 (07/12/20 1154)  BP: (!) 154/74 (07/12/20 1154)  SpO2: (!) 90 % (07/12/20 1154) Vital Signs (24h Range):  Temp:  [96.8 °F (36 °C)-99.1 °F (37.3 °C)] 96.8 °F (36 °C)  Pulse:  [60-88] 60  Resp:  [14-44] 18  SpO2:  [90 %-100 %] 90 %  BP: (114-168)/(43-88) 154/74     Weight: 90.7 kg (199 lb 15.3 oz)  Body mass index is 31.32 kg/m².    Intake/Output Summary (Last 24 hours) at 7/12/2020 1508  Last data filed at 7/12/2020 0945  Gross per 24 hour   Intake 1500.83 ml   Output 1650 ml   Net -149.17 ml      Physical Exam  Constitutional:       General: She is not in acute distress.     Appearance: She is well-developed. She is ill-appearing and toxic-appearing. She is not diaphoretic.   HENT:      Head: Normocephalic and atraumatic.      Mouth/Throat:      Mouth: Mucous membranes are dry.   Eyes:      General: Scleral icterus present.      Pupils: Pupils are equal, round, and reactive to light.   Cardiovascular:      Rate and Rhythm: Normal rate and regular rhythm.      Heart sounds: Normal heart sounds. No murmur. No friction rub. No gallop.    Pulmonary:      Effort: Pulmonary effort is  normal. No respiratory distress.      Breath sounds: Normal breath sounds. No stridor. No wheezing or rales.   Abdominal:      General: Bowel sounds are normal. There is no distension.      Palpations: Abdomen is soft. There is no mass.      Tenderness: There is no abdominal tenderness. There is no guarding.   Musculoskeletal:      Right lower leg: Edema present.      Left lower leg: Edema present.   Skin:     General: Skin is warm.      Capillary Refill: Capillary refill takes 2 to 3 seconds.      Coloration: Skin is jaundiced.      Findings: No erythema.   Neurological:      Motor: Weakness present.      Comments: disoriented   Psychiatric:         Mood and Affect: Mood normal.         Behavior: Behavior normal.         Significant Labs:   BMP:   Recent Labs   Lab 07/12/20  0444   GLU 94  94     142   K 3.5  3.5     108   CO2 22*  22*   BUN 54*  54*   CREATININE 1.9*  1.9*   CALCIUM 9.0  9.0   MG 1.7     CBC:   Recent Labs   Lab 07/10/20  1758  07/11/20  0445 07/11/20  1548 07/12/20  0444   WBC 8.63  8.63  --  7.55  --  7.98   HGB 7.7*  7.7*   < > 8.3*  8.3* 8.5* 9.1*  9.1*   HCT 23.5*  23.5*  --  24.4*  --  27.8*   PLT 53*  53*  --  47*  --  42*    < > = values in this interval not displayed.     CMP:   Recent Labs   Lab 07/11/20  0445 07/11/20  1548 07/12/20  0444     139 143 142  142   K 3.4*  3.4* 3.2* 3.5  3.5     105 106 108  108   CO2 21*  21* 20* 22*  22*   GLU 93  93 90 94  94   BUN 55*  54* 55* 54*  54*   CREATININE 2.2*  2.2* 2.0* 1.9*  1.9*   CALCIUM 9.0  9.2 9.1 9.0  9.0   PROT 8.4  --  8.5*   ALBUMIN 2.4*  --  2.3*   BILITOT 18.3*  --  17.8*   ALKPHOS 113  --  110   *  --  112*   ALT 40  --  39   ANIONGAP 13  13 17* 12  12   EGFRNONAA 27*  27* 30* 32*  32*     All pertinent labs within the past 24 hours have been reviewed.    Significant Imaging: I have reviewed all pertinent imaging results/findings within the past 24 hours.

## 2020-07-12 NOTE — ASSESSMENT & PLAN NOTE
Hemoglobin now stable after banding. Octreotide completed.  -Propranolol started for secondary variceal protection  -Message sent to get patient outpatient about in 1 week from discharge  -Will need repeat EGD in 4 weeks

## 2020-07-12 NOTE — PROGRESS NOTES
[facility] -   Hematology/Oncology  Progress Note    Patient Name: John Wray  Admission Date: 7/8/2020  Hospital Length of Stay: 3 days  Code Status: Full Code     Subjective:     HPI:  44 year old female, presented to the ER with weakness and SOB. Patient is currently confused and unable to provide any history, Hx obtained from the medical record and medical staff. Review of Care Everywhere shows a visit to [facility] on 08/27/19 for alcoholic cirrhosis with h/o esophageal varices, ascites and mild encephalopathy. She had positive HAV IgG and negative HCV RNA. HBV status unknown. 04/2019 US showed small ascites and CT scan showed diffuse fatty infiltration of liver with hepatosplenomegaly and enlarged gastric varices consistent with portal hypertension, and cholelithiasis. Baseline Hgb around 9. LFTs were similar to this admit with T. Bili over 10 and INR below 2.   Hgb: 3.8 after two units of blood, 2 units of FFP and 10mg Vit. K overnight. ER reported dark red stools overnight. No BM in ICU. INR: 2.0. LFT elevated. WBC elevated. Afebrile. No significant tachycardia and BP has been primarily normal to elevated. UA unremarkable. CXR showed possible process on the right. Nurse reports no complaints of pain from patient and no vomiting. Daughter at bedside reports patient continues to drink ETOH heavily, patient denies this.     Interval history:  Extubated.  On 3 L oxygen and resting quietly.  No obvious distress.      Review of Systems   Constitutional: Positive for malaise/fatigue. Negative for chills and fever.   HENT: Negative for congestion.    Eyes: Negative for blurred vision.   Respiratory: Negative for cough, sputum production and shortness of breath.    Cardiovascular: Negative for chest pain and leg swelling.   Gastrointestinal: Negative for abdominal pain, nausea and vomiting.   Genitourinary: Negative for dysuria.   Musculoskeletal: Negative for myalgias.   Skin: Negative for  rash.   Neurological: Negative for dizziness, weakness and headaches.   Endo/Heme/Allergies: Does not bruise/bleed easily.   Psychiatric/Behavioral: The patient is not nervous/anxious.        Physical Exam  Constitutional:       General: She is not in acute distress.     Appearance: She is well-developed and overweight. She is ill-appearing. She is not toxic-appearing or diaphoretic.      Interventions: She is intubated and restrained.   HENT:      Head: Normocephalic and atraumatic.      Mouth/Throat:      Mouth: Mucous membranes are dry.      Eyes:      General: Lids are normal. Scleral icterus present.      Pupils: Pupils are equal, round, and reactive to light.   Neck:      Musculoskeletal: Normal range of motion.      Vascular: No carotid bruit.      Trachea: Trachea normal.   Cardiovascular:      Rate and Rhythm: Normal rate and regular rhythm.      Pulses:           Radial pulses are 2+ on the right side and 2+ on the left side.        Dorsalis pedis pulses are 1+ on the right side and 1+ on the left side.      Heart sounds: Normal heart sounds.   Pulmonary:      Effort: Pulmonary effort is normal. No tachypnea, accessory muscle usage or respiratory distress. She is intubated.      Breath sounds: Transmitted upper airway sounds present. Rales present.   Abdominal:      General: Bowel sounds are decreased. There is distension (mild ).      Palpations: Abdomen is soft.      Tenderness: There is no abdominal tenderness.   Genitourinary:     Comments: [place] and rectal tube in place  Musculoskeletal: Normal range of motion.      Right lower le+ Edema present.      Left lower le+ Edema present.      Right foot: No deformity.      Left foot: No deformity.   Lymphadenopathy:      Cervical: No cervical adenopathy.   Skin:     General: Skin is warm and dry.      Capillary Refill: Capillary refill takes less than 2 seconds.      Findings: No rash.   Neurological:      Mental Status: She is alert and easily  aroused.                 Assessment/Plan:     # acute blood-loss anemia:  -status post 6 units of packed red blood cell within last unit on 07/10/2020.  -status post FFP, platelets and cryo.  -most recent hemoglobin noted at 9.1 grams/deciliter.  -status post EGD on 07/10/2020 that showed esophageal varices in the mid esophagus and distal esophagus which were banded per GI.    Continue daily CBC.  Target hemoglobin is above 8 grams/deciliter given cardiomyopathy.    #Coagulopathy  -Secondary to decompensated cirrhosis  -recommend obtaining a repeat LDH, haptoglobin, total and direct bilirubin to rule out intravascular hemolysis.  Will also obtain Tevin tests if not already done.  - Daily INR    # severe thrombocytopenia:  -likely related to alcohol abuse, bone marrow damage by alcohol as well as decompensated liver disease.  -noted platelet count at 83309.  -will continue to monitor.    #Alcohol abuse:  -on alcohol withdrawal protocol per ICU management.  - case management to assist with possible rehab placement at discharge    #Decompensated HCV cirrhosis:  -Management per hepatology.      Thank you for your consult. I will follow-up with patient. Please contact us if you have any additional questions.     Trey Palumbo MD  Hematology/Oncology  [facility]

## 2020-07-12 NOTE — ASSESSMENT & PLAN NOTE
INR 2.0, not on oral anticoagulation.  Coagulopathy secondary to decompensated liver cirrhosis.  Vitamin K 10 mg IV x1.    7/10:  Continue to monitor   PT/PTT  FFP if needed   Fibrinogen > 100   Haptoglobin low, will repeat  Concern for hemolysis  Heme/onc on case     7/11  Heme Onc  Monitor  Transfuse as indicated

## 2020-07-12 NOTE — ASSESSMENT & PLAN NOTE
Severe symptomatic anemia.  Hemoglobin was undetectable initially one in the ED presentation, currently 3.8 after 1.5 units of PRBC transfusion.  Transfused total of 3 units PRBC.  Repeat H&H and may need further transfusion after that.  Closely monitor in the ICU.  Patient denies hematemesis.    7/11  Stable  CLD on tolerance  GI on Consult

## 2020-07-12 NOTE — PROGRESS NOTES
RT here to do home O2 eval. Patient's family member is cleaning patient up. RT will return when done.

## 2020-07-12 NOTE — ASSESSMENT & PLAN NOTE
Patient clinically improved. But given high MELD prognosis guarded but this will have to be watched as an outpatient      MELD-Na score: 29 at 7/12/2020  4:44 AM  MELD score: 29 at 7/12/2020  4:44 AM  Calculated from:  Serum Creatinine: 1.9 mg/dL at 7/12/2020  4:44 AM  Serum Sodium: 142 mmol/L (Rounded to 137 mmol/L) at 7/12/2020  4:44 AM  Total Bilirubin: 17.8 mg/dL at 7/12/2020  4:44 AM  INR(ratio): 1.7 at 7/12/2020  4:44 AM  Age: 44 years 6 months

## 2020-07-12 NOTE — ASSESSMENT & PLAN NOTE
MELD score 29 this AM  Stop Octreotide (post 72 hours)  GI following:   EGD 7/10: Large (> 5 mm) varices were found in the mid esophagus and in the        distal esophagus. Four bands were successfully placed with        incomplete eradication of varices. There was no bleeding during the        maneuver.        Severe portal hypertensive gastropathy was found in the entire        examined stomach.        The examined duodenum was normal.        Bleeding with small clots.   Monitor NH3, INR and CMP daily.   Advance diet per GI  Repeat EGD in 4 weeks  Outpatient follow up with Dr. Spicer

## 2020-07-12 NOTE — ASSESSMENT & PLAN NOTE
7/10:  Ammonia level continue to be elevated  Patient refusing oral lactulose  Will continue lactulose rectally     7/12  Resolved  Monitor

## 2020-07-13 ENCOUNTER — TELEPHONE (OUTPATIENT)
Dept: GASTROENTEROLOGY | Facility: CLINIC | Age: 45
End: 2020-07-13

## 2020-07-13 ENCOUNTER — TELEPHONE (OUTPATIENT)
Dept: ENDOSCOPY | Facility: HOSPITAL | Age: 45
End: 2020-07-13

## 2020-07-13 VITALS
TEMPERATURE: 99 F | HEART RATE: 63 BPM | WEIGHT: 187.63 LBS | DIASTOLIC BLOOD PRESSURE: 62 MMHG | SYSTOLIC BLOOD PRESSURE: 131 MMHG | HEIGHT: 67 IN | OXYGEN SATURATION: 96 % | BODY MASS INDEX: 29.45 KG/M2 | RESPIRATION RATE: 18 BRPM

## 2020-07-13 DIAGNOSIS — K74.69 DECOMPENSATED HCV CIRRHOSIS: Primary | ICD-10-CM

## 2020-07-13 DIAGNOSIS — N18.30 STAGE 3 CHRONIC KIDNEY DISEASE: ICD-10-CM

## 2020-07-13 DIAGNOSIS — D62 ACUTE BLOOD LOSS ANEMIA: ICD-10-CM

## 2020-07-13 DIAGNOSIS — B19.20 DECOMPENSATED HCV CIRRHOSIS: Primary | ICD-10-CM

## 2020-07-13 LAB
ALBUMIN SERPL BCP-MCNC: 2.3 G/DL (ref 3.5–5.2)
ALP SERPL-CCNC: 145 U/L (ref 55–135)
ALT SERPL W/O P-5'-P-CCNC: 40 U/L (ref 10–44)
AMMONIA PLAS-SCNC: 40 UMOL/L (ref 10–50)
ANION GAP SERPL CALC-SCNC: 12 MMOL/L (ref 8–16)
ANION GAP SERPL CALC-SCNC: 12 MMOL/L (ref 8–16)
AST SERPL-CCNC: 91 U/L (ref 10–40)
BASOPHILS # BLD AUTO: 0.06 K/UL (ref 0–0.2)
BASOPHILS NFR BLD: 0.8 % (ref 0–1.9)
BILIRUB SERPL-MCNC: 15.6 MG/DL (ref 0.1–1)
BUN SERPL-MCNC: 47 MG/DL (ref 6–20)
BUN SERPL-MCNC: 47 MG/DL (ref 6–20)
CALCIUM SERPL-MCNC: 9.5 MG/DL (ref 8.7–10.5)
CALCIUM SERPL-MCNC: 9.5 MG/DL (ref 8.7–10.5)
CHLORIDE SERPL-SCNC: 104 MMOL/L (ref 95–110)
CHLORIDE SERPL-SCNC: 104 MMOL/L (ref 95–110)
CO2 SERPL-SCNC: 23 MMOL/L (ref 23–29)
CO2 SERPL-SCNC: 23 MMOL/L (ref 23–29)
CREAT SERPL-MCNC: 2 MG/DL (ref 0.5–1.4)
CREAT SERPL-MCNC: 2 MG/DL (ref 0.5–1.4)
DIFFERENTIAL METHOD: ABNORMAL
EOSINOPHIL # BLD AUTO: 0.4 K/UL (ref 0–0.5)
EOSINOPHIL NFR BLD: 5.3 % (ref 0–8)
ERYTHROCYTE [DISTWIDTH] IN BLOOD BY AUTOMATED COUNT: 25.2 % (ref 11.5–14.5)
EST. GFR  (AFRICAN AMERICAN): 34 ML/MIN/1.73 M^2
EST. GFR  (AFRICAN AMERICAN): 34 ML/MIN/1.73 M^2
EST. GFR  (NON AFRICAN AMERICAN): 30 ML/MIN/1.73 M^2
EST. GFR  (NON AFRICAN AMERICAN): 30 ML/MIN/1.73 M^2
GLUCOSE SERPL-MCNC: 120 MG/DL (ref 70–110)
GLUCOSE SERPL-MCNC: 120 MG/DL (ref 70–110)
HCT VFR BLD AUTO: 27.5 % (ref 37–48.5)
HGB BLD-MCNC: 9.2 G/DL (ref 12–16)
HGB BLD-MCNC: 9.2 G/DL (ref 12–16)
IMM GRANULOCYTES # BLD AUTO: 0.11 K/UL (ref 0–0.04)
IMM GRANULOCYTES NFR BLD AUTO: 1.5 % (ref 0–0.5)
INR PPP: 1.7 (ref 0.8–1.2)
LYMPHOCYTES # BLD AUTO: 0.7 K/UL (ref 1–4.8)
LYMPHOCYTES NFR BLD: 9.5 % (ref 18–48)
MAGNESIUM SERPL-MCNC: 1.6 MG/DL (ref 1.6–2.6)
MCH RBC QN AUTO: 31.8 PG (ref 27–31)
MCHC RBC AUTO-ENTMCNC: 33.5 G/DL (ref 32–36)
MCV RBC AUTO: 95 FL (ref 82–98)
MONOCYTES # BLD AUTO: 0.9 K/UL (ref 0.3–1)
MONOCYTES NFR BLD: 12.7 % (ref 4–15)
NEUTROPHILS # BLD AUTO: 5.1 K/UL (ref 1.8–7.7)
NEUTROPHILS NFR BLD: 70.2 % (ref 38–73)
NRBC BLD-RTO: 1 /100 WBC
PLATELET # BLD AUTO: 54 K/UL (ref 150–350)
PMV BLD AUTO: 10.8 FL (ref 9.2–12.9)
POTASSIUM SERPL-SCNC: 3.8 MMOL/L (ref 3.5–5.1)
POTASSIUM SERPL-SCNC: 3.8 MMOL/L (ref 3.5–5.1)
PROT SERPL-MCNC: 9 G/DL (ref 6–8.4)
PROTHROMBIN TIME: 17.2 SEC (ref 9–12.5)
RBC # BLD AUTO: 2.89 M/UL (ref 4–5.4)
RBC CD59 DEFICIENT NFR: 0 % (ref 0–0)
SODIUM SERPL-SCNC: 139 MMOL/L (ref 136–145)
SODIUM SERPL-SCNC: 139 MMOL/L (ref 136–145)
WBC # BLD AUTO: 7.3 K/UL (ref 3.9–12.7)

## 2020-07-13 PROCEDURE — 82140 ASSAY OF AMMONIA: CPT

## 2020-07-13 PROCEDURE — 97530 THERAPEUTIC ACTIVITIES: CPT | Performed by: PHYSICAL THERAPIST

## 2020-07-13 PROCEDURE — 99900035 HC TECH TIME PER 15 MIN (STAT)

## 2020-07-13 PROCEDURE — 97116 GAIT TRAINING THERAPY: CPT

## 2020-07-13 PROCEDURE — 83735 ASSAY OF MAGNESIUM: CPT

## 2020-07-13 PROCEDURE — 94761 N-INVAS EAR/PLS OXIMETRY MLT: CPT

## 2020-07-13 PROCEDURE — S4991 NICOTINE PATCH NONLEGEND: HCPCS | Performed by: NURSE PRACTITIONER

## 2020-07-13 PROCEDURE — 97110 THERAPEUTIC EXERCISES: CPT

## 2020-07-13 PROCEDURE — 99233 SBSQ HOSP IP/OBS HIGH 50: CPT | Mod: ,,, | Performed by: INTERNAL MEDICINE

## 2020-07-13 PROCEDURE — 25000003 PHARM REV CODE 250: Performed by: INTERNAL MEDICINE

## 2020-07-13 PROCEDURE — 25000003 PHARM REV CODE 250: Performed by: NURSE PRACTITIONER

## 2020-07-13 PROCEDURE — 97110 THERAPEUTIC EXERCISES: CPT | Performed by: PHYSICAL THERAPIST

## 2020-07-13 PROCEDURE — 80053 COMPREHEN METABOLIC PANEL: CPT

## 2020-07-13 PROCEDURE — 85610 PROTHROMBIN TIME: CPT

## 2020-07-13 PROCEDURE — 99233 PR SUBSEQUENT HOSPITAL CARE,LEVL III: ICD-10-PCS | Mod: ,,, | Performed by: INTERNAL MEDICINE

## 2020-07-13 PROCEDURE — 36415 COLL VENOUS BLD VENIPUNCTURE: CPT

## 2020-07-13 PROCEDURE — 63600175 PHARM REV CODE 636 W HCPCS: Performed by: NURSE PRACTITIONER

## 2020-07-13 PROCEDURE — 85025 COMPLETE CBC W/AUTO DIFF WBC: CPT

## 2020-07-13 PROCEDURE — 27000221 HC OXYGEN, UP TO 24 HOURS

## 2020-07-13 PROCEDURE — C9113 INJ PANTOPRAZOLE SODIUM, VIA: HCPCS | Performed by: NURSE PRACTITIONER

## 2020-07-13 RX ORDER — LANOLIN ALCOHOL/MO/W.PET/CERES
100 CREAM (GRAM) TOPICAL DAILY
Qty: 30 TABLET | Refills: 3 | Status: SHIPPED | OUTPATIENT
Start: 2020-07-14 | End: 2020-07-20 | Stop reason: SDUPTHER

## 2020-07-13 RX ORDER — PROPRANOLOL HYDROCHLORIDE 20 MG/1
20 TABLET ORAL 2 TIMES DAILY
Qty: 60 TABLET | Refills: 11 | Status: SHIPPED | OUTPATIENT
Start: 2020-07-13 | End: 2020-07-20 | Stop reason: SDUPTHER

## 2020-07-13 RX ADMIN — LACTULOSE 20 G: 20 SOLUTION ORAL at 09:07

## 2020-07-13 RX ADMIN — FOLIC ACID 1 MG: 1 TABLET ORAL at 09:07

## 2020-07-13 RX ADMIN — Medication 100 MG: at 09:07

## 2020-07-13 RX ADMIN — Medication 1 PATCH: at 09:07

## 2020-07-13 RX ADMIN — MUPIROCIN: 20 OINTMENT TOPICAL at 09:07

## 2020-07-13 RX ADMIN — PROPRANOLOL HYDROCHLORIDE 20 MG: 20 TABLET ORAL at 09:07

## 2020-07-13 RX ADMIN — PANTOPRAZOLE SODIUM 40 MG: 40 INJECTION, POWDER, LYOPHILIZED, FOR SOLUTION INTRAVENOUS at 09:07

## 2020-07-13 RX ADMIN — THERA TABS 1 TABLET: TAB at 09:07

## 2020-07-13 NOTE — TELEPHONE ENCOUNTER
Attempted to contact patient at [phone] with no answer. Unable to leave a voicemail message.

## 2020-07-13 NOTE — PLAN OF CARE
Patient AAOX 4. VSS..  Patient remained afebrile throughout shift.  Patient remained free of falls this shift  Patient 0/10  of pain this shift  Plan of care reviewed.  Patient verbalized understanding.  Patient moving/turning with assistance  Frequent weight shifting encouraged.  Patient NSR on monitor  Bed low, side rails up x2, wheels locked, call light in reach.  Bed alarm maintained for safety.  Patient instructed to call for assistance.  Hourly rounding completed.  Will continue to monitor.

## 2020-07-13 NOTE — ASSESSMENT & PLAN NOTE
Chronic alcohol abuse.  Currently taking folic acid and thiamine.  Continue reinforcement/ on alcohol cessation.

## 2020-07-13 NOTE — ASSESSMENT & PLAN NOTE
Patient with history of cirrhosis of the liver with esophageal varices per review of chart in Care everywhere and per EGD done on 7/10/2020.  Extensive history of alcohol abuse, reports of melena in ER.  Patient given 2 units of FFP and 10 mg of vitamin K for elevated INR which has reduced to 1.7 since admit - 3 on admit. Given 3 units of PRBC. EGD done on 7/10/2020 found extensive esophageal varices with banding performed on 4 varices.  Hemoglobin is improving and currently 9.2 g/dL.  No complaints of melena or GI blood loss.     --daily CBC CMP  --transfuse with PRBC for hemoglobin less than 7

## 2020-07-13 NOTE — TELEPHONE ENCOUNTER
----- Message from Edwige Spicer MD sent at 7/12/2020 10:51 AM CDT -----  Regarding: Hospital f/u for decompensated cirrhosis  Please have patient see NP Dayday in 1 week for hospital discharge follow up and schedule her to see me in 3 months.

## 2020-07-13 NOTE — PT/OT/SLP PROGRESS
"Occupational Therapy  Treatment    John Wray   MRN: [id number]   Admitting Diagnosis: Decompensated HCV cirrhosis    OT Date of Treatment: 07/13/20   OT Start Time: 0905  OT Stop Time: 0930  OT Total Time (min): 25 min    Billable Minutes:  Therapeutic Activity 15 min and Therapeutic Exercise 10 min    General Precautions: Standard, fall  Orthopedic Precautions: N/A  Braces: N/A         Subjective:  Communicated with Nurse Paget ane epic chart review prior to session.  Pt found supine in bed and agreeable to tx at this time.  Pain/Comfort  Pain Rating 1: 0/10    Objective:  Patient found with: telemetry, peripheral IV     Functional Mobility:  Therapeutic Activities and Exercises:  Pt performed supine>sit with SBA, scooted to EOB with SBA, donned gown as a robe with Min A, sit>stand using RW with SBA, functional mobility ~150ft using RW with SBA, t/f to chair using RW with SBA. Pt performed (B) UE ROM exercises in sitting 1 x 15 reps: shoulder flex, chest press, bicep curls.     AM-PAC 6 CLICK ADL   How much help from another person does this patient currently need?   1 = Unable, Total/Dependent Assistance  2 = A lot, Maximum/Moderate Assistance  3 = A little, Minimum/Contact Guard/Supervision  4 = None, Modified [place]/Independent    Putting on and taking off regular lower body clothing? : 3  Bathing (including washing, rinsing, drying)?: 3  Toileting, which includes using toilet, bedpan, or urinal? : 3  Putting on and taking off regular upper body clothing?: 3  Taking care of personal grooming such as brushing teeth?: 3  Eating meals?: 4  Daily Activity Total Score: 19     AM-PAC Raw Score CMS "G-Code Modifier Level of Impairment Assistance   6 % Total / Unable   7 - 8 CM 80 - 100% Maximal Assist   9-13 CL 60 - 80% Moderate Assist   14 - 19 CK 40 - 60% Moderate Assist   20 - 22 CJ 20 - 40% Minimal Assist   23 CI 1-20% SBA / CGA   24 CH 0% Independent/ Mod I       Patient left up in chair with all " lines intact, call button in reach, chair alarm on and Nurse Paget notified    ASSESSMENT:  John Wray is a 44 y.o. female with a medical diagnosis of Decompensated HCV cirrhosis and presents with impaired functional mobility and ADLs. Pt will benefit from continued skilled OT in order to address the listed impairments.     Rehab identified problem list/impairments: Rehab identified problem list/impairments: weakness, impaired endurance, impaired self care skills, impaired functional mobilty, gait instability, impaired balance, decreased safety awareness    Rehab potential is good.    Activity tolerance: Fair    Discharge recommendations: Discharge Facility/Level of Care Needs: home health PT     Barriers to discharge:  UNKNOWN    Equipment recommendations: none     GOALS:   Multidisciplinary Problems     Occupational Therapy Goals        Problem: Occupational Therapy Goal    Goal Priority Disciplines Outcome Interventions   Occupational Therapy Goal     OT, PT/OT Ongoing, Progressing    Description: OT GOALS TO BE MET BY 7-19-20  S WITH UE DRESSING  S WITH LE DRESSING  PT WILL TOLERATE 1 SET X 15 REPS B UE ROM EXERCISE WITH MIN RESISTANCE  S WITH G/H STANDING AT SINK SIDE WITH FAIR+ STANDING BALANCE                   Plan:  Patient to be seen 3 x/week to address the above listed problems via self-care/home management, therapeutic activities, therapeutic exercises  Plan of Care expires: 07/19/20  Plan of Care reviewed with: patient         Melissa Freddy, PT/OT  07/13/2020

## 2020-07-13 NOTE — ASSESSMENT & PLAN NOTE
Secondary to cirrhosis of the liver, elevated INR and APTT on admission, negative for report of taking an anticoagulant.  Given 2 units FFP and 10 mg vitamin K overnight on 7/9/2020, given an additional 4 units of FFP on 07/10.  INR pending.  Hemoglobin currently 9.2 g/dL,  With continue elevated PT/INR, aptt normalized.      --daily CBC to monitor for bleeding

## 2020-07-13 NOTE — TELEPHONE ENCOUNTER
Called patient to advise appointment on 07/20/20 at 11:20am and to see if that would be an ok appointment day and time. Patient needed appointment for hospital follow up. No answer and no voicemail set up.

## 2020-07-13 NOTE — PLAN OF CARE
POC reviewed with pt, pt verbalized understanding.   Pt instructed to keep all follow up appt.   Pt instructed to  medication at [facility].   IV removed.   Heart monitor removed and returned to monitor room tech.  Pt wheeled to front lobby with family to return home.

## 2020-07-13 NOTE — SUBJECTIVE & OBJECTIVE
Interval History:  Patient sitting up on the side of the bed eating breakfast.  States that she is feeling good.  Denies any abnormal GI bleeding.     Oncology Treatment Plan:   [No treatment plan]    Medications:  Continuous Infusions:    Scheduled Meds:   cefTRIAXone (ROCEPHIN) IVPB  2 g Intravenous Q24H    folic acid  1 mg Oral Daily    lactulose  20 g Oral TID    multivitamin  1 tablet Oral Daily    mupirocin   Nasal BID    nicotine  1 patch Transdermal Daily    pantoprazole  40 mg Intravenous BID    propranoloL  20 mg Oral BID    thiamine  100 mg Oral Daily     PRN Meds:ondansetron, sodium chloride 0.9%     Review of patient's allergies indicates:  No Known Allergies     Past Medical History:   Diagnosis Date    Alcohol abuse     Cirrhosis     Depression     Hypertension     Portal hypertension with esophageal varices      Past Surgical History:   Procedure Laterality Date     SECTION      ESOPHAGOGASTRODUODENOSCOPY       Family History     None        Tobacco Use    Smoking status: Current Every Day Smoker     Packs/day: 0.50   Substance and Sexual Activity    Alcohol use: Not Currently    Drug use: Never    Sexual activity: Not on file       Review of Systems   Constitutional: Negative.    HENT: Negative.    Respiratory: Negative.    Cardiovascular: Negative.    Gastrointestinal: Negative.    Endocrine: Negative.    Genitourinary: Negative.    Musculoskeletal: Negative.    Skin: Negative.    Allergic/Immunologic: Negative.    Neurological: Negative.    Hematological: Negative.    Psychiatric/Behavioral: Negative.      Objective:     Vital Signs (Most Recent):  Temp: 99.4 °F (37.4 °C) (20 0739)  Pulse: (!) 55 (20 0900)  Resp: 18 (20 07)  BP: 131/62 (20 0739)  SpO2: 96 % (20 0755) Vital Signs (24h Range):  Temp:  [96.8 °F (36 °C)-99.4 °F (37.4 °C)] 99.4 °F (37.4 °C)  Pulse:  [55-88] 55  Resp:  [16-18] 18  SpO2:  [90 %-100 %] 96 %  BP: (126-161)/(62-76)  131/62     Weight: 85.1 kg (187 lb 9.8 oz)  Body mass index is 29.38 kg/m².  Body surface area is 2.01 meters squared.      Intake/Output Summary (Last 24 hours) at 7/13/2020 1014  Last data filed at 7/13/2020 0800  Gross per 24 hour   Intake 1088 ml   Output --   Net 1088 ml       Physical Exam  Vitals signs and nursing note reviewed.   Constitutional:       General: She is not in acute distress.     Appearance: She is not ill-appearing.      Comments: States that she is feeling well and is ready for discharge   HENT:      Head: Normocephalic and atraumatic.      Right Ear: Hearing and external ear normal.      Left Ear: Hearing and external ear normal.      Nose: No rhinorrhea.      Right Sinus: No maxillary sinus tenderness or frontal sinus tenderness.      Left Sinus: No maxillary sinus tenderness or frontal sinus tenderness.      Mouth/Throat:      Mouth: No oral lesions.      Pharynx: Uvula midline.   Eyes:      General:         Right eye: No discharge.         Left eye: No discharge.      Conjunctiva/sclera: Conjunctivae normal.      Pupils: Pupils are equal, round, and reactive to light.   Neck:      Musculoskeletal: Normal range of motion.      Thyroid: No thyromegaly.      Vascular: No carotid bruit.      Trachea: No tracheal deviation.   Cardiovascular:      Rate and Rhythm: Regular rhythm. Tachycardia present.      Pulses:           Dorsalis pedis pulses are 1+ on the right side and 1+ on the left side.      Heart sounds: S1 normal and S2 normal. Murmur present.   Pulmonary:      Effort: Pulmonary effort is normal. No respiratory distress.      Breath sounds: No decreased breath sounds or rales.   Abdominal:      General: There is no distension.      Palpations: There is no fluid wave or mass.      Tenderness: There is no abdominal tenderness.   Musculoskeletal: Normal range of motion.   Lymphadenopathy:      Cervical: No cervical adenopathy.      Upper Body:      Right upper body: No supraclavicular  adenopathy.      Left upper body: No supraclavicular adenopathy.   Skin:     General: Skin is warm and dry.      Capillary Refill: Capillary refill takes less than 2 seconds.      Findings: No rash.   Neurological:      Sensory: No sensory deficit.   Psychiatric:         Attention and Perception: Attention and perception normal.         Mood and Affect: Mood and affect normal.         Speech: Speech normal.         Behavior: Behavior is cooperative.         Thought Content: Thought content normal.         Cognition and Memory: Cognition normal.         Judgment: Judgment normal.         Significant Labs:   CBC:   Recent Labs   Lab 07/12/20 0444 07/12/20  1523 07/13/20  0507   WBC 7.98  --  7.30   HGB 9.1*  9.1* 9.4* 9.2*  9.2*   HCT 27.8*  --  27.5*   PLT 42*  --  54*    and CMP:   Recent Labs   Lab 07/12/20 0444 07/12/20  1523 07/13/20  0507     142 140 139  139   K 3.5  3.5 3.4* 3.8  3.8     108 102 104  104   CO2 22*  22* 22* 23  23   GLU 94  94 135* 120*  120*   BUN 54*  54* 51* 47*  47*   CREATININE 1.9*  1.9* 2.0* 2.0*  2.0*   CALCIUM 9.0  9.0 9.5 9.5  9.5   PROT 8.5*  --  9.0*   ALBUMIN 2.3*  --  2.3*   BILITOT 17.8*  --  15.6*   ALKPHOS 110  --  145*   *  --  91*   ALT 39  --  40   ANIONGAP 12  12 16 12  12   EGFRNONAA 32*  32* 30* 30*  30*       Diagnostic Results:  I have reviewed all pertinent imaging results/findings within the past 24 hours.

## 2020-07-13 NOTE — PROGRESS NOTES
[facility] -   Nephrology  Progress Note    Patient Name: John Wray  MRN: [id number]  Admission Date: 7/8/2020  Hospital Length of Stay: 4 days  Attending Provider: No att. providers found   Primary Care Physician: Primary Doctor No  Principal Problem:Decompensated HCV cirrhosis    Subjective:     HPI: 44 year old female with HTN, portal hypertension, EtOH abuse, liver cirrhosis, Hep C presents to [facility] with severe GI bleed. Patient s/p blood transfusion with 2 units of PRBC in ER. Hgb was 3.8 following transfusion. She is receiving 3rd PRBC at present.  Patient also presents with SUNG and creatinine of 2.1 and metabolic acidosis.    Nephrology was consulted to help with patient's renal care while she is admitted at [facility]. I saw and examined patient in her hospital room. Patient reports that SOB and weakness have improved. She reports diarrhea yesterday. No nausea/vomiting. No pain at present, no LE edema. She made about 150 cc of urine overnight (recorded).     Chart review revealed creatinine of 0.8 on 4/15/19.     Interval History: Pt was seen and examined before d/c home this am. No new c/po's, feels ready to go home, no abd pain, no SOB.    Review of patient's allergies indicates:  No Known Allergies  No current facility-administered medications for this encounter.      Current Outpatient Medications   Medication    folic acid (FOLVITE) 1 MG tablet    furosemide (LASIX) 40 MG tablet    [START ON 7/14/2020] multivit-iron-FA-calcium-mins 9 mg iron-400 mcg Tab tablet    pantoprazole (PROTONIX) 40 MG tablet    propranoloL (INDERAL) 20 MG tablet    sertraline (ZOLOFT) 25 MG tablet    spironolactone (ALDACTONE) 50 MG tablet    [START ON 7/14/2020] thiamine 100 MG tablet       Objective:     Vital Signs (Most Recent):  Temp: 99.4 °F (37.4 °C) (07/13/20 0739)  Pulse: 63 (07/13/20 1100)  Resp: 18 (07/13/20 0739)  BP: 131/62 (07/13/20 0739)  SpO2: 96 % (07/13/20 0755)  O2 Device (Oxygen Therapy):  nasal cannula (07/13/20 1100) Vital Signs (24h Range):  Temp:  [98.1 °F (36.7 °C)-99.4 °F (37.4 °C)] 99.4 °F (37.4 °C)  Pulse:  [55-70] 63  Resp:  [16-18] 18  SpO2:  [91 %-100 %] 96 %  BP: (126-143)/(62-76) 131/62     Weight: 85.1 kg (187 lb 9.8 oz) (07/13/20 0452)  Body mass index is 29.38 kg/m².  Body surface area is 2.01 meters squared.    I/O last 3 completed shifts:  In: 1918.8 [P.O.:1248; I.V.:120.8; NG/GT:450; IV Piggyback:100]  Out: 855 [Urine:855]    Physical Exam  Vitals signs and nursing note reviewed.   Constitutional:       Appearance: Normal appearance.   HENT:      Head: Normocephalic and atraumatic.   Neck:      Musculoskeletal: Normal range of motion.   Cardiovascular:      Rate and Rhythm: Normal rate and regular rhythm.      Pulses: Normal pulses.      Heart sounds: Normal heart sounds.   Pulmonary:      Effort: Pulmonary effort is normal.      Breath sounds: Normal breath sounds. No rales.   Abdominal:      General: Abdomen is flat.      Palpations: Abdomen is soft.   Musculoskeletal:      Right lower leg: No edema.   Skin:     General: Skin is warm and dry.   Neurological:      General: No focal deficit present.      Mental Status: She is alert and oriented to person, place, and time.         Significant Labs: reviewed  BMP  Lab Results   Component Value Date     07/13/2020     07/13/2020    K 3.8 07/13/2020    K 3.8 07/13/2020     07/13/2020     07/13/2020    CO2 23 07/13/2020    CO2 23 07/13/2020    BUN 47 (H) 07/13/2020    BUN 47 (H) 07/13/2020    CREATININE 2.0 (H) 07/13/2020    CREATININE 2.0 (H) 07/13/2020    CALCIUM 9.5 07/13/2020    CALCIUM 9.5 07/13/2020    ANIONGAP 12 07/13/2020    ANIONGAP 12 07/13/2020    ESTGFRAFRICA 34 (A) 07/13/2020    ESTGFRAFRICA 34 (A) 07/13/2020    EGFRNONAA 30 (A) 07/13/2020    EGFRNONAA 30 (A) 07/13/2020       Significant Imaging: reviewed     Assessment/Plan:     45 y/o female with SUNG on CKD and hepatic decompensation:     Acute  renal failure  SUNG on CKD stage 3, baseline s Cr not known however. s Cr improved and stable, likely this is pt's baseline  SUNG resolved. Stable renal function  SUNG likely due to prerenal azotemia: hypotension, anemia, and diarrhea (resolved)  K normal  Metabolic acidosis      H/o of hep C noted  U/a shows no proteinuria or hematuria, therefore hep C likely has not affected the kidneys.     Decompensated HCV cirrhosis  Has liver cirrhosis  Due to hep C and alcoholism  Decompensated, presented with GI bleed, likely has varices  Elevated bilirubin  Hypoalbuminemia  Hepatorenal syndrome is not unlikely, but is a diagnosis of exclusion  Will defer to GI     Advised pt to refrain from drinking alcohol.  Smoking cessation recommended as well.        Plans and recommendations:  As reviewed above  OK to d/c home from the renal view        Cole Lunsford MD  Nephrology  [facility]

## 2020-07-13 NOTE — PLAN OF CARE
Jul20 New Patient with ALEX Cueva  Monday Jul 20, 2020 11:20 AM  Arrive at check-in approximately 15 minutes before your scheduled appointment time. Bring all outside medical records and imaging, along with a list of your current medications and insurance card.  4th Floor - From [address], take the [facility] exit ([address]). Enter the facility from the [address] [facility]  [address] [facility] [place] [facility] [address]  [phone]        07/13/20 1240   Final Note   Assessment Type Final Discharge Note   Anticipated Discharge Disposition Home   Right Care Referral Info   Post Acute Recommendation No Care

## 2020-07-13 NOTE — ASSESSMENT & PLAN NOTE
43 y/o female with SUNG on CKD and hepatic decompensation:     Acute renal failure  SUNG on CKD stage 3, baseline s Cr not known however.  s Cr stable, unchanged. Stable renal function  SUNG likely due to prerenal azotemia: hypotension, anemia, and diarrhea (resolved)  FENa <1, c/w prerenal azotemia.  K not high  Metabolic acidosis  UOP stable      H/o of hep C noted  U/a shows no proteinuria or hematuria, therefore hep C likely has not affected the kidneys.     Decompensated HCV cirrhosis  Has liver cirrhosis  Due to hep C and alcoholism  Decompensated, presented with GI bleed, likely has varices  Elevated bilirubin  Hypoalbuminemia  Hepatorenal syndrome is not unlikely, but is a diagnosis of exclusion  Will defer to GI     Pt's youngest daughter was at bedside today. Met with her and advised her to encourage her mother to rfefrain from drinking alcohol. She conform pt is an alcoholic.        Plans and recommendations:  As reviewed above  Total critical care time spent 30 minutes

## 2020-07-13 NOTE — SUBJECTIVE & OBJECTIVE
Interval History: Pt was seen and examined before d/c home this am. No new c/po's, feels ready to go home, no abd pain, no SOB.    Review of patient's allergies indicates:  No Known Allergies  No current facility-administered medications for this encounter.      Current Outpatient Medications   Medication    folic acid (FOLVITE) 1 MG tablet    furosemide (LASIX) 40 MG tablet    [START ON 7/14/2020] multivit-iron-FA-calcium-mins 9 mg iron-400 mcg Tab tablet    pantoprazole (PROTONIX) 40 MG tablet    propranoloL (INDERAL) 20 MG tablet    sertraline (ZOLOFT) 25 MG tablet    spironolactone (ALDACTONE) 50 MG tablet    [START ON 7/14/2020] thiamine 100 MG tablet       Objective:     Vital Signs (Most Recent):  Temp: 99.4 °F (37.4 °C) (07/13/20 0739)  Pulse: 63 (07/13/20 1100)  Resp: 18 (07/13/20 0739)  BP: 131/62 (07/13/20 0739)  SpO2: 96 % (07/13/20 0755)  O2 Device (Oxygen Therapy): nasal cannula (07/13/20 1100) Vital Signs (24h Range):  Temp:  [98.1 °F (36.7 °C)-99.4 °F (37.4 °C)] 99.4 °F (37.4 °C)  Pulse:  [55-70] 63  Resp:  [16-18] 18  SpO2:  [91 %-100 %] 96 %  BP: (126-143)/(62-76) 131/62     Weight: 85.1 kg (187 lb 9.8 oz) (07/13/20 0452)  Body mass index is 29.38 kg/m².  Body surface area is 2.01 meters squared.    I/O last 3 completed shifts:  In: 1918.8 [P.O.:1248; I.V.:120.8; NG/GT:450; IV Piggyback:100]  Out: 855 [Urine:855]    Physical Exam  Vitals signs and nursing note reviewed.   Constitutional:       Appearance: Normal appearance.   HENT:      Head: Normocephalic and atraumatic.   Neck:      Musculoskeletal: Normal range of motion.   Cardiovascular:      Rate and Rhythm: Normal rate and regular rhythm.      Pulses: Normal pulses.      Heart sounds: Normal heart sounds.   Pulmonary:      Effort: Pulmonary effort is normal.      Breath sounds: Normal breath sounds. No rales.   Abdominal:      General: Abdomen is flat.      Palpations: Abdomen is soft.   Musculoskeletal:      Right lower leg: No  edema.   Skin:     General: Skin is warm and dry.   Neurological:      General: No focal deficit present.      Mental Status: She is alert and oriented to person, place, and time.         Significant Labs: reviewed  BMP  Lab Results   Component Value Date     07/13/2020     07/13/2020    K 3.8 07/13/2020    K 3.8 07/13/2020     07/13/2020     07/13/2020    CO2 23 07/13/2020    CO2 23 07/13/2020    BUN 47 (H) 07/13/2020    BUN 47 (H) 07/13/2020    CREATININE 2.0 (H) 07/13/2020    CREATININE 2.0 (H) 07/13/2020    CALCIUM 9.5 07/13/2020    CALCIUM 9.5 07/13/2020    ANIONGAP 12 07/13/2020    ANIONGAP 12 07/13/2020    ESTGFRAFRICA 34 (A) 07/13/2020    ESTGFRAFRICA 34 (A) 07/13/2020    EGFRNONAA 30 (A) 07/13/2020    EGFRNONAA 30 (A) 07/13/2020       Significant Imaging: reviewed

## 2020-07-13 NOTE — PROGRESS NOTES
[facility] -   Hematology/Oncology  Progress Note    Patient Name: John Wray  Admission Date: 7/8/2020  Hospital Length of Stay: 4 days  Code Status: Full Code     Subjective:     HPI:  44 year old female, presented to the ER with weakness and SOB. Patient is currently confused and unable to provide any history, Hx obtained from the medical record and medical staff. Review of Care Everywhere shows a visit to [facility] on 08/27/19 for alcoholic cirrhosis with h/o esophageal varices, ascites and mild encephalopathy. She had positive HAV IgG and negative HCV RNA. HBV status unknown. 04/2019 US showed small ascites and CT scan showed diffuse fatty infiltration of liver with hepatosplenomegaly and enlarged gastric varices consistent with portal hypertension, and cholelithiasis. Baseline Hgb around 9. LFTs were similar to this admit with T. Bili over 10 and INR below 2.   Hgb: 3.8 after two units of blood, 2 units of FFP and 10mg Vit. K overnight. ER reported dark red stools overnight. No BM in ICU. INR: 2.0. LFT elevated. WBC elevated. Afebrile. No significant tachycardia and BP has been primarily normal to elevated. UA unremarkable. CXR showed possible process on the right. Nurse reports no complaints of pain from patient and no vomiting. Daughter at bedside reports patient continues to drink ETOH heavily, patient denies this.     Interval History:  Patient sitting up on the side of the bed eating breakfast.  States that she is feeling good.  Denies any abnormal GI bleeding.     Oncology Treatment Plan:   [No treatment plan]    Medications:  Continuous Infusions:    Scheduled Meds:   cefTRIAXone (ROCEPHIN) IVPB  2 g Intravenous Q24H    folic acid  1 mg Oral Daily    lactulose  20 g Oral TID    multivitamin  1 tablet Oral Daily    mupirocin   Nasal BID    nicotine  1 patch Transdermal Daily    pantoprazole  40 mg Intravenous BID    propranoloL  20 mg Oral BID    thiamine  100 mg Oral  Daily     PRN Meds:ondansetron, sodium chloride 0.9%     Review of patient's allergies indicates:  No Known Allergies     Past Medical History:   Diagnosis Date    Alcohol abuse     Cirrhosis     Depression     Hypertension     Portal hypertension with esophageal varices      Past Surgical History:   Procedure Laterality Date     SECTION      ESOPHAGOGASTRODUODENOSCOPY       Family History     None        Tobacco Use    Smoking status: Current Every Day Smoker     Packs/day: 0.50   Substance and Sexual Activity    Alcohol use: Not Currently    Drug use: Never    Sexual activity: Not on file       Review of Systems   Constitutional: Negative.    HENT: Negative.    Respiratory: Negative.    Cardiovascular: Negative.    Gastrointestinal: Negative.    Endocrine: Negative.    Genitourinary: Negative.    Musculoskeletal: Negative.    Skin: Negative.    Allergic/Immunologic: Negative.    Neurological: Negative.    Hematological: Negative.    Psychiatric/Behavioral: Negative.      Objective:     Vital Signs (Most Recent):  Temp: 99.4 °F (37.4 °C) (20 07)  Pulse: (!) 55 (20 0900)  Resp: 18 (20 07)  BP: 131/62 (20)  SpO2: 96 % (20 0755) Vital Signs (24h Range):  Temp:  [96.8 °F (36 °C)-99.4 °F (37.4 °C)] 99.4 °F (37.4 °C)  Pulse:  [55-88] 55  Resp:  [16-18] 18  SpO2:  [90 %-100 %] 96 %  BP: (126-161)/(62-76) 131/62     Weight: 85.1 kg (187 lb 9.8 oz)  Body mass index is 29.38 kg/m².  Body surface area is 2.01 meters squared.      Intake/Output Summary (Last 24 hours) at 2020 1014  Last data filed at 2020 0800  Gross per 24 hour   Intake 1088 ml   Output --   Net 1088 ml       Physical Exam  Vitals signs and nursing note reviewed.   Constitutional:       General: She is not in acute distress.     Appearance: She is not ill-appearing.      Comments: States that she is feeling well and is ready for discharge   HENT:      Head: Normocephalic and atraumatic.       Right Ear: Hearing and external ear normal.      Left Ear: Hearing and external ear normal.      Nose: No rhinorrhea.      Right Sinus: No maxillary sinus tenderness or frontal sinus tenderness.      Left Sinus: No maxillary sinus tenderness or frontal sinus tenderness.      Mouth/Throat:      Mouth: No oral lesions.      Pharynx: Uvula midline.   Eyes:      General:         Right eye: No discharge.         Left eye: No discharge.      Conjunctiva/sclera: Conjunctivae normal.      Pupils: Pupils are equal, round, and reactive to light.   Neck:      Musculoskeletal: Normal range of motion.      Thyroid: No thyromegaly.      Vascular: No carotid bruit.      Trachea: No tracheal deviation.   Cardiovascular:      Rate and Rhythm: Regular rhythm. Tachycardia present.      Pulses:           Dorsalis pedis pulses are 1+ on the right side and 1+ on the left side.      Heart sounds: S1 normal and S2 normal. Murmur present.   Pulmonary:      Effort: Pulmonary effort is normal. No respiratory distress.      Breath sounds: No decreased breath sounds or rales.   Abdominal:      General: There is no distension.      Palpations: There is no fluid wave or mass.      Tenderness: There is no abdominal tenderness.   Musculoskeletal: Normal range of motion.   Lymphadenopathy:      Cervical: No cervical adenopathy.      Upper Body:      Right upper body: No supraclavicular adenopathy.      Left upper body: No supraclavicular adenopathy.   Skin:     General: Skin is warm and dry.      Capillary Refill: Capillary refill takes less than 2 seconds.      Findings: No rash.   Neurological:      Sensory: No sensory deficit.   Psychiatric:         Attention and Perception: Attention and perception normal.         Mood and Affect: Mood and affect normal.         Speech: Speech normal.         Behavior: Behavior is cooperative.         Thought Content: Thought content normal.         Cognition and Memory: Cognition normal.         Judgment:  Judgment normal.         Significant Labs:   CBC:   Recent Labs   Lab 07/12/20  0444 07/12/20  1523 07/13/20  0507   WBC 7.98  --  7.30   HGB 9.1*  9.1* 9.4* 9.2*  9.2*   HCT 27.8*  --  27.5*   PLT 42*  --  54*    and CMP:   Recent Labs   Lab 07/12/20  0444 07/12/20  1523 07/13/20  0507     142 140 139  139   K 3.5  3.5 3.4* 3.8  3.8     108 102 104  104   CO2 22*  22* 22* 23  23   GLU 94  94 135* 120*  120*   BUN 54*  54* 51* 47*  47*   CREATININE 1.9*  1.9* 2.0* 2.0*  2.0*   CALCIUM 9.0  9.0 9.5 9.5  9.5   PROT 8.5*  --  9.0*   ALBUMIN 2.3*  --  2.3*   BILITOT 17.8*  --  15.6*   ALKPHOS 110  --  145*   *  --  91*   ALT 39  --  40   ANIONGAP 12  12 16 12  12   EGFRNONAA 32*  32* 30* 30*  30*       Diagnostic Results:  I have reviewed all pertinent imaging results/findings within the past 24 hours.    Assessment/Plan:     * Decompensated HCV cirrhosis  Management per hepatology.  Liver enzymes improved since admit.      Alcohol abuse  Chronic alcohol abuse.  Currently taking folic acid and thiamine.  Continue reinforcement/ on alcohol cessation.      Secondary esophageal varices with bleeding  EGD on 7/10/2020 found with large esophageal varices with bleeding requiring banding.  Hemoglobin has stabilized and is improving daily.  Currently 9.2 g/dL.  No complaints of continued GI bleeding  --CBC daily  --Monitor for s/s of bleeding  --Encourage alcohol cessation    Acute blood loss anemia  Patient with history of cirrhosis of the liver with esophageal varices per review of chart in Care everywhere and per EGD done on 7/10/2020.  Extensive history of alcohol abuse, reports of melena in ER.  Patient given 2 units of FFP and 10 mg of vitamin K for elevated INR which has reduced to 1.7 since admit - 3 on admit. Given 3 units of PRBC. EGD done on 7/10/2020 found extensive esophageal varices with banding performed on 4 varices.  Hemoglobin is improving and currently 9.2 g/dL.   No complaints of melena or GI blood loss.     --daily CBC CMP  --transfuse with PRBC for hemoglobin less than 7    Coagulopathy  Secondary to cirrhosis of the liver, elevated INR and APTT on admission, negative for report of taking an anticoagulant.  Given 2 units FFP and 10 mg vitamin K overnight on 7/9/2020, given an additional 4 units of FFP on 07/10.  Hemoglobin currently 9.2 g/dL,  With continue elevated PT/INR, aptt normalized.      --daily CBC to monitor for bleeding          Thank you for your consult. I will sign off. Please contact us if you have any additional questions.     Tara Deluna NP  Hematology/Oncology  [facility]

## 2020-07-13 NOTE — PT/OT/SLP PROGRESS
Physical Therapy  Treatment    John Wray   MRN: [id number]   Admitting Diagnosis: Decompensated HCV cirrhosis    PT Received On: 07/13/20  PT Start Time: 0915     PT Stop Time: 0940    PT Total Time (min): 25 min       Billable Minutes:  Gait Training 15 and Therapeutic Exercise 10    Treatment Type: Treatment  PT/PTA: PT     PTA Visit Number: 0       General Precautions: Standard, fall  Orthopedic Precautions: N/A   Braces: N/A    Subjective:  Communicated with NURSE PAGET prior to session.  Pain/Comfort  Pain Rating 1: 0/10    Objective:   Patient found with: telemetry, peripheral IV    Functional Mobility:  Therapeutic Activities and Exercises:  PT FOUND SUPINE IN BED UPON ARRIVAL, AGREEABLE TO TX., SUP>SIT WITH SBA, SEATED SCOOT TO EOB WITH SBA, PT ' WITH RW AND SBA, NO LOB OR SOB ON ROOM AIR, QUICK TO FATIGUE, PT RETURN TO ROOM TO BEDSIDE CHAIR WITH SBA, PT EDUCATED IN AND PERFORMED BLE THEREX X 20 REPS AROM WITH REST, PT ENCOURAGED TO INCREASE TIME OOB IN CHAIR, PT AGREEABLE    AM-PAC 6 CLICK MOBILITY  How much help from another person does this patient currently need?   1 = Unable, Total/Dependent Assistance  2 = A lot, Maximum/Moderate Assistance  3 = A little, Minimum/Contact Guard/Supervision  4 = None, Modified [place]/Independent    Turning over in bed (including adjusting bedclothes, sheets and blankets)?: 4  Sitting down on and standing up from a chair with arms (e.g., wheelchair, bedside commode, etc.): 4  Moving from lying on back to sitting on the side of the bed?: 4  Moving to and from a bed to a chair (including a wheelchair)?: 4  Need to walk in hospital room?: 4  Climbing 3-5 steps with a railing?: 1  Basic Mobility Total Score: 21    AM-PAC Raw Score CMS G-Code Modifier Level of Impairment Assistance   6 % Total / Unable   7 - 9 CM 80 - 100% Maximal Assist   10 - 14 CL 60 - 80% Moderate Assist   15 - 19 CK 40 - 60% Moderate Assist   20 - 22 CJ 20 - 40% Minimal Assist   23  CI 1-20% SBA / CGA   24 CH 0% Independent/ Mod I     Patient left up in chair with all lines intact, call button in reach, chair alarm on and NURSE notified.    Assessment:  John Wray is a 44 y.o. female with a medical diagnosis of Decompensated HCV cirrhosis and presents with IMPAIRED FUNCTIONAL MOBILITY. PT WILL BENEFIT FROM CONT. SKILLED P.T. TO ADDRESS IMPAIRMENTS    Rehab identified problem list/impairments: Rehab identified problem list/impairments: weakness, impaired endurance, impaired balance, gait instability, impaired functional mobilty    Rehab potential is good.    Activity tolerance: Good    Discharge recommendations: Discharge Facility/Level of Care Needs: home health PT     Barriers to discharge:      Equipment recommendations: Equipment Needed After Discharge: walker, rolling     GOALS:   Multidisciplinary Problems     Physical Therapy Goals        Problem: Physical Therapy Goal    Goal Priority Disciplines Outcome Goal Variances Interventions   Physical Therapy Goal     PT, PT/OT Ongoing, Progressing     Description: 1. Patient will perform supine to/from sit mod indep  2. Patient will perform sit to/from stand with least AD sba no gross LOB  3. Patient will ambulate >200ft least AD sba no gross LOB                   PLAN:    Patient to be seen 5 x/week  to address the above listed problems via gait training, therapeutic activities, therapeutic exercises  Plan of Care expires: 07/19/20  Plan of Care reviewed with: patient    Maria L Alanis, PT  07/13/2020

## 2020-07-13 NOTE — DISCHARGE SUMMARY
[facility] Hospital Medicine  Discharge Summary      Patient Name: John Wray  MRN: [id number]  Admission Date: 7/8/2020  Hospital Length of Stay: 4 days  Discharge Date and Time: No discharge date for patient encounter.  Attending Physician: Yonas Red MD   Discharging Provider: Yonas Red MD  Primary Care Provider: Primary Doctor No      HPI:   Ms. Wray is a 44-year-old  female with known history of liver cirrhosis from chronic alcohol abuse and hepatitis, presented to the ED complaining of worsening abdominal distention and shortness of breath the past few days.  Reports last alcoholic drink few weeks ago.  Patient is a very poor historian.  She also describes black tarry stools for the past few weeks, but has not sought medical attention until today.  Hemoglobin was undetectable upon initial arrival.  She received 1.5 unit of PRBC transfusion so far, repeat CBC reveals hemoglobin 3.8, hematocrit 12.9, WBC 33622, with platelets 80945.  INR 2.0, not on chronic anticoagulation.  Creatinine elevated at 1.6, CO2 8.  Total bilirubin 14.9, , ALT 44.  Ammonia 65.  BNP 28 29.  Troponin 0.092.  Stool Hemoccult test is positive.  Patient is hemodynamically stable, blood pressure 141/65.  Patient reports history of paracentesis long time ago.    Admitting diagnosis GI bleed with melena, severe symptomatic anemia, hemoglobin initially undetectable, now 3.8 after 1.5 unit PRBC transfusion.  Decompensated liver cirrhosis.    Procedure(s) (LRB):  EGD (ESOPHAGOGASTRODUODENOSCOPY) (N/A)      Hospital Course:   7/10: s/p 3 units pRBC, hgb still < 7. Will transfuse 1 more unit. Haptoglobin low concerned for hemolysis. Will repeat haptoglobin. Repeat PT/PTT. Continue FFP if needed. Plt improved after 1 units of plt given. Cont to monitor in ICU.   7/11 - Esophageal varices s/p banding. Intubated / Sedaetd. Discussed with cc team  7/12 - Tolerating Diet. Stable. Monitor      Patient 43 yo female admitted due to GIB requiring transfusion, prolonged intubation and EGD. EGD demonstrating Severe Varices 2/2 etoh abuse. Patient successfully extubated and recovered appropriately. Patient recommended by GI for follow up EGD and followup is scheduled. Patient tolerating diet and appropriate for d/c.     Consults:   Consults (From admission, onward)        Status Ordering Provider     Inpatient consult to Gastroenterology  Once     Provider:  Charly Lacey III, MD    Completed SUDHAKAR THOMAS     Inpatient consult to Hematology/Oncology  Once     Provider:  (Not yet assigned)    Completed PIPO GUERRA     Inpatient consult to Nephrology  Once     Provider:  Cole Lunsford MD    Acknowledged JEVON RIZVI     Inpatient consult to Pulmonology  Once     Provider:  (Not yet assigned)    Acknowledged JEVON RIZVI     Inpatient consult to Registered Dietitian/Nutritionist  Once     Provider:  (Not yet assigned)    Completed SUDHAKAR THOMAS     Inpatient consult to Social Work  Once     Provider:  (Not yet assigned)    Acknowledged KIN MCKEON          No new Assessment & Plan notes have been filed under this hospital service since the last note was generated.  Service: Hospital Medicine    Final Active Diagnoses:    Diagnosis Date Noted POA    PRINCIPAL PROBLEM:  Decompensated HCV cirrhosis [B19.20, K74.69] 07/09/2020 Yes    Electrolyte imbalance [E87.8] 07/12/2020 No    Acute respiratory failure with hypoxia [J96.01] 07/10/2020 Yes    Stage 3 chronic kidney disease [N18.3]  Yes    Coagulopathy [D68.9] 07/09/2020 Yes    Secondary esophageal varices with bleeding [I85.11] 07/09/2020 Yes    Encephalopathy, metabolic [G93.41] 07/09/2020 Yes    Alcohol abuse [F10.10] 07/09/2020 Yes     Chronic    Acute blood loss anemia [D62] 07/09/2020 Yes      Problems Resolved During this Admission:    Diagnosis Date Noted Date Resolved POA    Gastrointestinal hemorrhage with melena [K92.1]  07/13/2020  Yes    Prerenal acute renal failure [N17.9]  07/13/2020 Yes    Metabolic acidosis, increased anion gap [E87.2] 07/09/2020 07/11/2020 Yes    Hypomagnesemia [E83.42] 07/09/2020 07/11/2020 Yes    Acute renal failure superimposed on stage 3 chronic kidney disease [N17.9, N18.3]  07/12/2020 Yes       Discharged Condition: stable    Disposition: Home or Self Care    Follow Up:  Follow-up Information     [facility].    Specialty: General Practice  Contact information:  [id number]  [facility] [id number] [phone]             Edwige Spicer MD In 1 week.    Specialties: Gastroenterology, Hepatology  Why: EGD follow up  Contact information:  [id number]  [place] [address] [phone]                 Patient Instructions:      Diet Dysphagia Mechanical Soft     Activity as tolerated       Significant Diagnostic Studies: Labs:   BMP:   Recent Labs   Lab 07/12/20  0444 07/12/20  1523 07/13/20  0507   GLU 94  94 135* 120*  120*     142 140 139  139   K 3.5  3.5 3.4* 3.8  3.8     108 102 104  104   CO2 22*  22* 22* 23  23   BUN 54*  54* 51* 47*  47*   CREATININE 1.9*  1.9* 2.0* 2.0*  2.0*   CALCIUM 9.0  9.0 9.5 9.5  9.5   MG 1.7 1.6 1.6   , CMP   Recent Labs   Lab 07/12/20  0444 07/12/20  1523 07/13/20  0507     142 140 139  139   K 3.5  3.5 3.4* 3.8  3.8     108 102 104  104   CO2 22*  22* 22* 23  23   GLU 94  94 135* 120*  120*   BUN 54*  54* 51* 47*  47*   CREATININE 1.9*  1.9* 2.0* 2.0*  2.0*   CALCIUM 9.0  9.0 9.5 9.5  9.5   PROT 8.5*  --  9.0*   ALBUMIN 2.3*  --  2.3*   BILITOT 17.8*  --  15.6*   ALKPHOS 110  --  145*   *  --  91*   ALT 39  --  40   ANIONGAP 12  12 16 12  12   ESTGFRAFRICA 37*  37* 34* 34*  34*   EGFRNONAA 32*  32* 30* 30*  30*   , CBC   Recent Labs   Lab 07/12/20  0444 07/12/20  1523 07/13/20  0507   WBC 7.98  --  7.30   HGB 9.1*  9.1* 9.4* 9.2*  9.2*   HCT 27.8*  --  27.5*   PLT 42*  --  54*   , Troponin   Recent  Labs   Lab 07/08/20  1852   TROPONINI 0.092*    and All labs within the past 24 hours have been reviewed    Pending Diagnostic Studies:     Procedure Component Value Units Date/Time    [id number] Evaluation [[id number]] Collected: 07/09/20 1007    Order Status: Sent Lab Status: In process Updated: 07/09/20 1027    Specimen: Blood     Narrative:      Collection has been rescheduled by MARY at 07/09/2020 08:21 Reason:   pt getting blood    Basic metabolic panel [[id number]]     Order Status: Sent Lab Status: No result     Specimen: Blood     Hemoglobin [[id number]]     Order Status: Sent Lab Status: No result     Specimen: Blood     Magnesium [[id number]]     Order Status: Sent Lab Status: No result     Specimen: Blood     PN[facility] PROFILE, RBC [[id number]] Collected: 07/10/20 0800    Order Status: Sent Lab Status: In process Updated: 07/11/20 1058    Specimen: Blood     Sodium, urine, random [[id number]] Collected: 07/09/20 0800    Order Status: Sent Lab Status: In process Updated: 07/11/20 1755    Specimen: Urine, Catheterized     Vitamin B1 [[id number]] Collected: 07/09/20 1456    Order Status: Sent Lab Status: In process Updated: 07/09/20 1956    Specimen: Blood          Medications:  Reconciled Home Medications:      Medication List      START taking these medications    multivitamin Tab  Take 1 tablet by mouth once daily.  Start taking on: July 14, 2020     propranoloL 20 MG tablet  Commonly known as: INDERAL  Take 1 tablet (20 mg total) by mouth 2 (two) times daily.     thiamine 100 MG tablet  Take 1 tablet (100 mg total) by mouth once daily.  Start taking on: July 14, 2020        CONTINUE taking these medications    folic acid 1 MG tablet  Commonly known as: FOLVITE  Take 1 mg by mouth once daily.     furosemide 40 MG tablet  Commonly known as: LASIX  Take 40 mg by mouth 2 (two) times daily.     pantoprazole 40 MG tablet  Commonly known as: PROTONIX  Take 40 mg by mouth once daily.     sertraline 25 MG tablet  Commonly  known as: ZOLOFT  Take 25 mg by mouth once daily.     spironolactone 50 MG tablet  Commonly known as: ALDACTONE  Take 50 mg by mouth once daily.            Indwelling Lines/Drains at time of discharge:   Lines/Drains/Airways     None                 Time spent on the discharge of patient: 35 minutes  Patient was seen and examined on the date of discharge and determined to be suitable for discharge.         Yonas Red MD  Department of Hospital Medicine  [facility]

## 2020-07-14 ENCOUNTER — PATIENT OUTREACH (OUTPATIENT)
Dept: ADMINISTRATIVE | Facility: CLINIC | Age: 45
End: 2020-07-14

## 2020-07-14 ENCOUNTER — NURSE TRIAGE (OUTPATIENT)
Dept: ADMINISTRATIVE | Facility: CLINIC | Age: 45
End: 2020-07-14

## 2020-07-14 DIAGNOSIS — K74.60 HEPATIC CIRRHOSIS, UNSPECIFIED HEPATIC CIRRHOSIS TYPE, UNSPECIFIED WHETHER ASCITES PRESENT: Primary | ICD-10-CM

## 2020-07-14 LAB — BACTERIA BLD CULT: NORMAL

## 2020-07-14 NOTE — TELEPHONE ENCOUNTER
Given transportation info: call Medicaid to check into transport, send referral to [facility]. Pt declined to receive numbers for resources in area. States already set up for Medicaid transport.

## 2020-07-14 NOTE — TELEPHONE ENCOUNTER
"Called pt for [facility] call. Pt mentions that she has "big ole blister" on left hand between thumb and index finger. noticed after discharge. Currently 10/10. Denies redness/swellling/streaks/fever. Instructed to go to ER or urgent care now. States has no transportation. When asked about uber/taxi she stated that she will wait.     Reason for Disposition   SEVERE pain (e.g., excruciating, unable to use hand at all)    Additional Information   Negative: Similar pain previously and it was from 'heart attack'   Negative: Similar pain previously from 'angina' and not relieved by nitroglycerin   Negative: Sounds like a life-threatening emergency to the triager   Negative: Followed a hand or wrist injury   Negative: Chest pain   Negative: Caused by an animal bite   Negative: Wound looks infected   Negative: Fever and red area (or area very tender to touch)   Negative: Fever and swollen joint   Negative: Patient sounds very sick or weak to the triager    Protocols used: HAND AND WRIST PAIN-A-OH      "

## 2020-07-15 LAB
ADAMTS13 ACTIVITY: NORMAL %
VIT B1 BLD-MCNC: 36 UG/L (ref 38–122)

## 2020-07-16 ENCOUNTER — TELEPHONE (OUTPATIENT)
Dept: HEMATOLOGY/ONCOLOGY | Facility: CLINIC | Age: 45
End: 2020-07-16

## 2020-07-16 NOTE — PHYSICIAN QUERY
PT Name: John Wray  MR #: [id number]     RESPIRATORY CONDITION CLARIFICATION     CDS: Daisy JEFFRIES,RN        Contact information:[email or web address]  This form is a permanent document in the medical record.     Query Date: July 16, 2020    By submitting this query, we are merely seeking further clarification of documentation.  Please utilize your independent clinical judgment when addressing the question(s) below.  The Medical Record contains the following   Indicators   Supporting Clinical Findings Location in Medical Record   x SOB, FORTE, Wheezing, Productive Cough, Use of Accessory Muscles, etc. SOB shortness of breath    She presented to the ED on 7/8 due to worsening abdominal distension and SOB.     7/8/20  ED Provider Notes     7/9/20  Pulmonology Consults    RR         ABGs         O2 sat     x Hypoxia/Hypercapnia Acute respiratory failure with hypoxia 7/11/20 Pulmonology Assessment & Plan Note   x BiPAP/Intubation/Mechanical Ventilation Left intubated and on vent overnight  Tolerated SAT/SBT this AM and extubated  ICU pulm monitoring    7/11/20 Pulmonology Assessment & Plan Note      Supplemental O2      Home O2, Oxygen Dependence     x Respiratory Distress or Failure No respiratory distress  No respiratory distress    No respiratory distress      Acute respiratory failure with hypoxia  Left intubated and on vent overnight  Tolerated SAT/SBT this AM and extubated 7/8/20 ED Provider Notes  7/10/20  Hospital Medicine Progress Notes  7/13/20 Hematology and Oncology Progress Notes        7/11/20 Pulmonology Assessment & Plan Note     x Radiology Findings Reticular and ground-glass interstitial changes present.  Tiny right effusion.  Cardiac silhouette size enlargement.  Interstitial infectious process such as atypical pneumonia difficult to exclude.  Other considerations would include interstitial pulmonary edema.      7/8/20 Chest AP Portable X-Ray   x Acute/Chronic Illness Acute respiratory  failure with hypoxia    SUNG, Coagulopathy, decompensated HCV cirrhosis, ABLA,metabolic acidosis, increased anion gap 7/11/20 Pulmonology Assessment & Plan Note    7/10/20  Hospital Medicine Progress Notes    Treatment     x Other Pt required intubation before the EGD to protect airway during procedure. EGD performed 4 bands placed but bleeding in the oropharyngeal region        7/11 - EGD done yesterday noted large esophageal varices with stigma of recent bleeding and some bleeding suspected on oralpharynx. Left intubated on mech vent overnight and tolerated SAT/SBT this AM. VSS and no distress.      7/10/20 Intensive Care Nursing/Priscila RN                7/12/20 Pulmonology Progress Notes     Respiratory failure can be acute, chronic or both. It is generally further specified as hypoxic, hypercapnic or both. Lastly, it is important to identify an etiology, if known or suspected.   References:: https://www.[facility].org/archives/2013/10/coding.htm    http://[employer]/acute-respiratory-failure-know    The noted clinical guidelines are only system guidelines and do not replace the providers clinical judgment.    Provider, Due to the conflicting documentation regarding acute respiratory failure with hypoxia, please specify  the status associated with above clinical findings.     [   x ] Acute Respiratory Failure with Hypoxia - ABG pO2 < 60 mmHg or O2 sat of <91% on room air and respiratory symptoms documented   [    ] No Respiratory Failure, Maintained on Vent for Routine Care or Airway Protection -  purposely intubated for airway protection (e.g.: angioedema, stroke, trauma); without meeting the criteria for respiratory failure.   [    ] Other Respiratory Diagnosis (please specify): _________________   [   ] Clinically Undetermined     Present on admission (POA) status:   [   ] Yes (Y)                          [ ] Clinically Undetermined (W)  [   ] No (N)                            [   ] Documentation  insufficient to determine if condition is POA (U)          Please document in your progress notes daily for the duration of treatment until resolved and include in your discharge summary.

## 2020-07-17 NOTE — PHYSICIAN QUERY
PT Name: John Wray  MR #: [id number]     PRESENT ON ADMISSION (POA) DIAGNOSIS CLARIFICATION     CDS: Daisy JEFFRIES,RN        Contact information:[email or web address]  This form is a permanent document in the medical record.     Query Date: July 17, 2020    By submitting this query, we are merely seeking further clarification of documentation.  Please utilize your independent clinical judgment when addressing the question(s) below.     The Medical Record contains following diagnoses documented:  Clinical Information Location in Medical Record   SOB shortness of breath     She presented to the ED on 7/8 due to worsening abdominal distension and SOB    No respiratory distress  No respiratory distress     No respiratory distress      Reticular and ground-glass interstitial changes present.  Tiny right effusion.  Cardiac silhouette size enlargement.  Interstitial infectious process such as atypical pneumonia difficult to exclude.  Other considerations would include interstitial pulmonary edema.    Pt required intubation before the EGD to protect airway during procedure. EGD performed 4 bands placed but bleeding in the oropharyngeal region        Acute respiratory failure with hypoxia    Left intubated and on vent overnight  Tolerated SAT/SBT this AM and extubated  ICU pulm monitoring              7/8/20  ED Provider Notes     7/9/20  Pulmonology Consults      7/8/20 ED Provider Notes  7/10/20  Hospital Medicine Progress Notes    7/13/20 Hematology and Oncology Progress Notes            7/8/20 Chest  AP  Portable X-Ray       7/10/20 Intensive Care Nursing/Priscila RN            7/11/20 Pulmonology Assessment & Plan Note      7/11/20 Pulmonology Assessment & Plan Note      Present on admission (POA) is defined as present at the time inpatient admission occurs. Conditions that develop during an outpatient encounter, including emergency department, observation or outpatient surgery, are considered as present on  admission. [facility] 4th Quarter 2008      Please clarify the Present on Admission (POA) status of the diagnosis: __Acute respiratory failure with hypoxia___________________     Present on admission (POA) status:   [ x  ] Yes (Y)                    [   ] No (N)

## 2020-07-20 ENCOUNTER — TELEPHONE (OUTPATIENT)
Dept: GASTROENTEROLOGY | Facility: CLINIC | Age: 45
End: 2020-07-20

## 2020-07-20 ENCOUNTER — LAB VISIT (OUTPATIENT)
Dept: LAB | Facility: HOSPITAL | Age: 45
End: 2020-07-20
Attending: NURSE PRACTITIONER
Payer: MEDICAID

## 2020-07-20 ENCOUNTER — TELEPHONE (OUTPATIENT)
Dept: HEMATOLOGY/ONCOLOGY | Facility: CLINIC | Age: 45
End: 2020-07-20

## 2020-07-20 ENCOUNTER — OFFICE VISIT (OUTPATIENT)
Dept: GASTROENTEROLOGY | Facility: CLINIC | Age: 45
End: 2020-07-20
Payer: MEDICAID

## 2020-07-20 ENCOUNTER — CARE AT HOME (OUTPATIENT)
Dept: HOME HEALTH SERVICES | Facility: CLINIC | Age: 45
End: 2020-07-20
Payer: MEDICAID

## 2020-07-20 VITALS
OXYGEN SATURATION: 96 % | SYSTOLIC BLOOD PRESSURE: 170 MMHG | RESPIRATION RATE: 16 BRPM | TEMPERATURE: 99 F | DIASTOLIC BLOOD PRESSURE: 84 MMHG | HEART RATE: 69 BPM

## 2020-07-20 VITALS
BODY MASS INDEX: 29.83 KG/M2 | SYSTOLIC BLOOD PRESSURE: 110 MMHG | HEIGHT: 67 IN | WEIGHT: 190.06 LBS | DIASTOLIC BLOOD PRESSURE: 70 MMHG | HEART RATE: 67 BPM

## 2020-07-20 DIAGNOSIS — B19.20 DECOMPENSATED HCV CIRRHOSIS: ICD-10-CM

## 2020-07-20 DIAGNOSIS — D62 ACUTE BLOOD LOSS ANEMIA: ICD-10-CM

## 2020-07-20 DIAGNOSIS — K74.69 DECOMPENSATED HCV CIRRHOSIS: Primary | ICD-10-CM

## 2020-07-20 DIAGNOSIS — B19.20 DECOMPENSATED HCV CIRRHOSIS: Primary | ICD-10-CM

## 2020-07-20 DIAGNOSIS — N17.9 AKI (ACUTE KIDNEY INJURY): ICD-10-CM

## 2020-07-20 DIAGNOSIS — I85.11 SECONDARY ESOPHAGEAL VARICES WITH BLEEDING: ICD-10-CM

## 2020-07-20 DIAGNOSIS — K74.69 DECOMPENSATED HCV CIRRHOSIS: ICD-10-CM

## 2020-07-20 DIAGNOSIS — K74.60 HEPATIC CIRRHOSIS, UNSPECIFIED HEPATIC CIRRHOSIS TYPE, UNSPECIFIED WHETHER ASCITES PRESENT: ICD-10-CM

## 2020-07-20 DIAGNOSIS — F10.10 ALCOHOL ABUSE: Chronic | ICD-10-CM

## 2020-07-20 DIAGNOSIS — N18.30 STAGE 3 CHRONIC KIDNEY DISEASE: ICD-10-CM

## 2020-07-20 LAB
ALBUMIN SERPL BCP-MCNC: 2 G/DL (ref 3.5–5.2)
ALP SERPL-CCNC: 131 U/L (ref 55–135)
ALT SERPL W/O P-5'-P-CCNC: 28 U/L (ref 10–44)
ANION GAP SERPL CALC-SCNC: 8 MMOL/L (ref 8–16)
AST SERPL-CCNC: 74 U/L (ref 10–40)
BASOPHILS # BLD AUTO: 0.18 K/UL (ref 0–0.2)
BASOPHILS NFR BLD: 1.4 % (ref 0–1.9)
BILIRUB SERPL-MCNC: 10.3 MG/DL (ref 0.1–1)
BUN SERPL-MCNC: 25 MG/DL (ref 6–20)
CALCIUM SERPL-MCNC: 8.6 MG/DL (ref 8.7–10.5)
CHLORIDE SERPL-SCNC: 104 MMOL/L (ref 95–110)
CO2 SERPL-SCNC: 22 MMOL/L (ref 23–29)
CREAT SERPL-MCNC: 1.2 MG/DL (ref 0.5–1.4)
DIFFERENTIAL METHOD: ABNORMAL
EOSINOPHIL # BLD AUTO: 0.2 K/UL (ref 0–0.5)
EOSINOPHIL NFR BLD: 1.6 % (ref 0–8)
ERYTHROCYTE [DISTWIDTH] IN BLOOD BY AUTOMATED COUNT: 20.5 % (ref 11.5–14.5)
EST. GFR  (AFRICAN AMERICAN): >60 ML/MIN/1.73 M^2
EST. GFR  (NON AFRICAN AMERICAN): 55 ML/MIN/1.73 M^2
GLUCOSE SERPL-MCNC: 91 MG/DL (ref 70–110)
HCT VFR BLD AUTO: 24.6 % (ref 37–48.5)
HGB BLD-MCNC: 8.3 G/DL (ref 12–16)
IMM GRANULOCYTES # BLD AUTO: 0.07 K/UL (ref 0–0.04)
IMM GRANULOCYTES NFR BLD AUTO: 0.5 % (ref 0–0.5)
LYMPHOCYTES # BLD AUTO: 1.2 K/UL (ref 1–4.8)
LYMPHOCYTES NFR BLD: 9.5 % (ref 18–48)
MCH RBC QN AUTO: 32.2 PG (ref 27–31)
MCHC RBC AUTO-ENTMCNC: 33.7 G/DL (ref 32–36)
MCV RBC AUTO: 95 FL (ref 82–98)
MONOCYTES # BLD AUTO: 1.2 K/UL (ref 0.3–1)
MONOCYTES NFR BLD: 9.3 % (ref 4–15)
NEUTROPHILS # BLD AUTO: 10.2 K/UL (ref 1.8–7.7)
NEUTROPHILS NFR BLD: 78.2 % (ref 38–73)
NRBC BLD-RTO: 0 /100 WBC
PLATELET # BLD AUTO: 166 K/UL (ref 150–350)
PMV BLD AUTO: 11.1 FL (ref 9.2–12.9)
POTASSIUM SERPL-SCNC: 3.9 MMOL/L (ref 3.5–5.1)
PROT SERPL-MCNC: 9.1 G/DL (ref 6–8.4)
RBC # BLD AUTO: 2.58 M/UL (ref 4–5.4)
SODIUM SERPL-SCNC: 134 MMOL/L (ref 136–145)
WBC # BLD AUTO: 13.05 K/UL (ref 3.9–12.7)

## 2020-07-20 PROCEDURE — 99213 OFFICE O/P EST LOW 20 MIN: CPT | Mod: PBBFAC | Performed by: NURSE PRACTITIONER

## 2020-07-20 PROCEDURE — 99496 TRANSJ CARE MGMT HIGH F2F 7D: CPT | Mod: S$GLB,,, | Performed by: NURSE PRACTITIONER

## 2020-07-20 PROCEDURE — 36415 COLL VENOUS BLD VENIPUNCTURE: CPT

## 2020-07-20 PROCEDURE — 80053 COMPREHEN METABOLIC PANEL: CPT

## 2020-07-20 PROCEDURE — 99999 PR PBB SHADOW E&M-EST. PATIENT-LVL III: ICD-10-PCS | Mod: PBBFAC,,, | Performed by: NURSE PRACTITIONER

## 2020-07-20 PROCEDURE — 99496 TRANSITIONAL CARE MANAGE SERVICE 7 DAY DISCHARGE: ICD-10-PCS | Mod: S$GLB,,, | Performed by: NURSE PRACTITIONER

## 2020-07-20 PROCEDURE — 99214 OFFICE O/P EST MOD 30 MIN: CPT | Mod: S$PBB,,, | Performed by: NURSE PRACTITIONER

## 2020-07-20 PROCEDURE — 99999 PR PBB SHADOW E&M-EST. PATIENT-LVL III: CPT | Mod: PBBFAC,,, | Performed by: NURSE PRACTITIONER

## 2020-07-20 PROCEDURE — 99214 PR OFFICE/OUTPT VISIT, EST, LEVL IV, 30-39 MIN: ICD-10-PCS | Mod: S$PBB,,, | Performed by: NURSE PRACTITIONER

## 2020-07-20 PROCEDURE — 85025 COMPLETE CBC W/AUTO DIFF WBC: CPT

## 2020-07-20 RX ORDER — PROPRANOLOL HYDROCHLORIDE 20 MG/1
20 TABLET ORAL 2 TIMES DAILY
Qty: 60 TABLET | Refills: 11 | Status: SHIPPED | OUTPATIENT
Start: 2020-07-20 | End: 2020-09-29 | Stop reason: SDUPTHER

## 2020-07-20 RX ORDER — IBUPROFEN 200 MG
1 TABLET ORAL DAILY
Qty: 7 PATCH | Refills: 0 | Status: SHIPPED | OUTPATIENT
Start: 2020-08-03 | End: 2020-08-10

## 2020-07-20 RX ORDER — NICOTINE 7MG/24HR
1 PATCH, TRANSDERMAL 24 HOURS TRANSDERMAL DAILY
Qty: 7 PATCH | Refills: 0 | Status: SHIPPED | OUTPATIENT
Start: 2020-08-11 | End: 2020-08-18

## 2020-07-20 RX ORDER — IBUPROFEN 200 MG
1 TABLET ORAL DAILY
Qty: 14 PATCH | Refills: 0 | Status: SHIPPED | OUTPATIENT
Start: 2020-07-20 | End: 2020-08-03

## 2020-07-20 RX ORDER — LANOLIN ALCOHOL/MO/W.PET/CERES
100 CREAM (GRAM) TOPICAL DAILY
Qty: 30 TABLET | Refills: 3 | Status: SHIPPED | OUTPATIENT
Start: 2020-07-20

## 2020-07-20 RX ORDER — FOLIC ACID 1 MG/1
1 TABLET ORAL DAILY
Qty: 30 TABLET | Refills: 2 | Status: SHIPPED | OUTPATIENT
Start: 2020-07-20 | End: 2020-10-22

## 2020-07-20 NOTE — PROGRESS NOTES
Clinic Consult:  [facility] Consultation Note    Reason for Consult:  The primary encounter diagnosis was Decompensated HCV cirrhosis. Diagnoses of Secondary esophageal varices with bleeding, Alcohol abuse, and SUNG (acute kidney injury) were also pertinent to this visit.    PCP: Primary Doctor No   [id number] / [place] [id number]    HPI:  This is a 44 y.o. female here for evaluation of the above  Pt is here for F/U on recent hospitalization due to decompensated alcohol cirrhosis with severe anemia and upper GI bleeding.   During admission, she required multiple units of PRBCs for stabilization.    An EGD was completed which showed large EV that were banded.  No active bleeding seen but stigmata of recent bleeding noted.   She also had some confusion but was thought to be related to metabolic issues rather than hepatic encephalopathy.   Since discharge, she states that she has been feeling overall stable.   She has had no overt GI bleeding.  Has had no further episodes of confusion.   She states that she has been sober since discharge.   US was completed while hospital and showed Ill-defined hypoechoic structure at the alisia hepatis region measuring 2.8 cm, nonspecific, possibly bowel versus alisia hepatis lymph node, among others.  CT was not able to be completed due to kidney function.   She has had some worsening ascites since discharge as well.  Not currently on any diuretics due to kidney function while hospitalized.     Review of Systems   Constitutional: Positive for malaise/fatigue. Negative for chills, fever and weight loss.   Respiratory: Negative for cough.    Cardiovascular: Negative for chest pain.   Gastrointestinal:        Per HPI   Musculoskeletal: Negative for myalgias.   Skin: Negative for itching and rash.   Neurological: Negative for headaches.   Psychiatric/Behavioral: The patient is not nervous/anxious.        Medical History:   Past Medical History:   Diagnosis Date     "Alcohol abuse     Cirrhosis     Depression     Hypertension     Portal hypertension with esophageal varices        Surgical History:  Past Surgical History:   Procedure Laterality Date     SECTION      ESOPHAGOGASTRODUODENOSCOPY      ESOPHAGOGASTRODUODENOSCOPY N/A 7/10/2020    Procedure: EGD (ESOPHAGOGASTRODUODENOSCOPY);  Surgeon: Edwige Spicer MD;  Location: [facility];  Service: Endoscopy;  Laterality: N/A;       Family History:   History reviewed. No pertinent family history.    Social History:   Social History     Tobacco Use    Smoking status: Current Every Day Smoker     Packs/day: 0.50   Substance Use Topics    Alcohol use: Not Currently    Drug use: Never       Allergies: Reviewed    Home Medications:   Current Outpatient Medications on File Prior to Visit   Medication Sig Dispense Refill    furosemide (LASIX) 40 MG tablet Take 40 mg by mouth 2 (two) times daily.      multivit-iron-FA-calcium-mins 9 mg iron-400 mcg Tab tablet Take 1 tablet by mouth once daily. 30 tablet 3    pantoprazole (PROTONIX) 40 MG tablet Take 40 mg by mouth once daily.      sertraline (ZOLOFT) 25 MG tablet Take 25 mg by mouth once daily.      spironolactone (ALDACTONE) 50 MG tablet Take 50 mg by mouth once daily.      [DISCONTINUED] folic acid (FOLVITE) 1 MG tablet Take 1 mg by mouth once daily.      [DISCONTINUED] propranoloL (INDERAL) 20 MG tablet Take 1 tablet (20 mg total) by mouth 2 (two) times daily. 60 tablet 11    [DISCONTINUED] thiamine 100 MG tablet Take 1 tablet (100 mg total) by mouth once daily. 30 tablet 3     No current facility-administered medications on file prior to visit.        Physical Exam:  Vital Signs:  /70   Pulse 67   Ht 5' 7" (1.702 m)   Wt 86.2 kg (190 lb 0.6 oz)   LMP  (LMP Unknown)   BMI 29.76 kg/m²   Body mass index is 29.76 kg/m².  Physical Exam  Vitals signs reviewed.   Constitutional:       Appearance: She is well-developed.   HENT:      Head: Normocephalic.   Eyes: "      General: Scleral icterus present.   Neck:      Musculoskeletal: Normal range of motion.   Cardiovascular:      Rate and Rhythm: Normal rate.   Pulmonary:      Effort: Pulmonary effort is normal.   Abdominal:      General: There is distension (ascites).      Palpations: Abdomen is soft.      Tenderness: There is abdominal tenderness.   Musculoskeletal: Normal range of motion.   Skin:     General: Skin is dry.   Neurological:      Mental Status: She is alert and oriented to person, place, and time.         Labs: Pertinent labs reviewed.    Assessment:  1. Decompensated HCV cirrhosis    2. Secondary esophageal varices with bleeding    3. Alcohol abuse    4. SUNG (acute kidney injury)         Recommendations:  MELD was significantly elevated while hospitalized, will get updated MELD labs today for trending.   - if creatinine has improved, can re-start low-dose lasix and aldactone with close monitoring of electrolytes.   - will need repeat EGD in 3-4 weeks for re-banding.   - continue propranolol at current dose.   - continue folic acid and thiamine.   - will plan for CT triple phase for further evaluation of concerning area on US.   - EGD with banding in 3-4 weeks  Urged continued abstinence.   - pt it not currently a candidate for transplant due to recent ETOH consumption.     F/U in 2 weeks for close monitoring.       Thank you so much for allowing me to participate in the care of BRIAN Coelho

## 2020-07-20 NOTE — PROGRESS NOTES
[facility]  Transition of Care Home Visit    Visit Date: 7/20/2020  Encounter Provider: Alex Lubin NP  PCP:  Primary Doctor No    PRESENTING HISTORY      Patient ID: John Wray 44 y.o. female.    Consult Requested By:  Dr. Zuri Bush  Reason for Consult:  Transitional Care Coordination    Chief Complaint: Transitional Care     HPI:   Ms. Wray is a 44-year-old  female with known history of liver cirrhosis from chronic alcohol abuse and hepatitis, presented to the ED complaining of worsening abdominal distention and shortness of breath the past few days.  Reports last alcoholic drink few weeks ago.  Patient is a very poor historian.  She also describes black tarry stools for the past few weeks, but has not sought medical attention until today.  Hemoglobin was undetectable upon initial arrival.  She received 1.5 unit of PRBC transfusion so far, repeat CBC reveals hemoglobin 3.8, hematocrit 12.9, WBC 71154, with platelets 68622.  INR 2.0, not on chronic anticoagulation.  Creatinine elevated at 1.6, CO2 8.  Total bilirubin 14.9, , ALT 44.  Ammonia 65.  BNP 28 29.  Troponin 0.092.  Stool Hemoccult test is positive.  Patient is hemodynamically stable, blood pressure 141/65.  Patient reports history of paracentesis long time ago.     Admitting diagnosis GI bleed with melena, severe symptomatic anemia, hemoglobin initially undetectable, now 3.8 after 1.5 unit PRBC transfusion.  Decompensated liver cirrhosis.     Procedure(s) (LRB):  EGD (ESOPHAGOGASTRODUODENOSCOPY) (N/A)       Hospital Course:   7/10: s/p 3 units pRBC, hgb still < 7. Will transfuse 1 more unit. Haptoglobin low concerned for hemolysis. Will repeat haptoglobin. Repeat PT/PTT. Continue FFP if needed. Plt improved after 1 units of plt given. Cont to monitor in ICU.   7/11 - Esophageal varices s/p banding. Intubated / Sedaetd. Discussed with cc team  7/12 - Tolerating Diet. Stable. Monitor      Patient 43 yo  "female admitted due to GIB requiring transfusion, prolonged intubation and EGD. EGD demonstrating Severe Varices 2/2 etoh abuse. Patient successfully extubated and recovered appropriately. Patient recommended by GI for follow up EGD and followup is scheduled. Patient tolerating diet and appropriate for d/c.     Admit Date: 07/08/20  Discharge Date: 07/13/20  TCC Referral Date: 07/14/20  _________________________________________________________________    Today:  Ms. John Wray is a 44 y.o. female is being seen and examined at home today for transitional care visit to the home environment post-discharge from inpatient hospitalization encounter described above. John presents at baseline state of health as reported by patient.BP elevate dt the time of my visit, but she had not yet taken her morning medications. Encouraged to do so. Other VSS. No acute distress. Denies any signs of bleeding, no blood noted in her stool. She continues to report abdominal discomfort. Abdomen in protuberant. She has a follow-up appointment with GI at 11:20 today of which she was not aware. She contacted her daughter during my visit in the home to arrange for transportation. Denies any other acute issues, concerns or complaints to address on today's visit. Reports taking all medications as prescribed. She reports abstinence from ETOH ingestion since discharge. She "is done with all of that." She also reports smoking aprroximately .25-.5 packs of cigarettes per day. She does have the desire to stop. She was counseled about the risks to her health fromcontinued smoking and ETOH use. She has accepted my offer of a nicotine patch taper to assist with smoking cessation. Order sent to pharmacy of her choice. A lengthy discussion about advanced directives was also held. Surrogate decision maker is her daughter Kalee Cox. No other needs identified at this time. Risks of environmental exposure to coronavirus discussed including: social " "distancing, hand hygiene, and limiting departures from the home for necessities only.  Reports understanding and willingness to comply.      Review of Systems   Constitutional: Positive for fatigue ("always"). Negative for chills and fever.   HENT: Negative.  Negative for nosebleeds.    Respiratory: Positive for cough (chronic). Negative for shortness of breath.    Cardiovascular: Negative for chest pain and palpitations.   Gastrointestinal: Positive for abdominal distention and abdominal pain. Negative for anal bleeding, blood in stool, constipation, diarrhea, nausea and vomiting.   Skin: Negative.    Neurological: Positive for weakness. Negative for dizziness and light-headedness.   Psychiatric/Behavioral: Negative.      Assessments:  · Environmental: single story trailer home, 5 steps to enter, adequate lighting and temeprature control  · Functional Status: Independent with ADL's/IADL's, ambulates with assistance of a cane/walker, continent of bowel and bladder  · Safety: Fall Precautions, COVID Precautions/Social Distancing/Mask Use  · Nutritional: Adequate  · Home Health: none  · DME/Supplies: walker    PAST HISTORY:     Past Medical History:   Diagnosis Date    Alcohol abuse     Cirrhosis     Depression     Hypertension     Portal hypertension with esophageal varices        Past Surgical History:   Procedure Laterality Date     SECTION      ESOPHAGOGASTRODUODENOSCOPY      ESOPHAGOGASTRODUODENOSCOPY N/A 7/10/2020    Procedure: EGD (ESOPHAGOGASTRODUODENOSCOPY);  Surgeon: Edwige Spicer MD;  Location: [facility];  Service: Endoscopy;  Laterality: N/A;       No family history on file.    Social History     Socioeconomic History    Marital status: Single     Spouse name: Not on file    Number of children: Not on file    Years of education: Not on file    Highest education level: Not on file   Occupational History    Not on file   Social Needs    Financial resource strain: Not on file    Food " insecurity     Worry: Not on file     Inability: Not on file    Transportation needs     Medical: Not on file     Non-medical: Not on file   Tobacco Use    Smoking status: Current Every Day Smoker     Packs/day: 0.50   Substance and Sexual Activity    Alcohol use: Not Currently    Drug use: Never    Sexual activity: Not on file   Lifestyle    Physical activity     Days per week: Not on file     Minutes per session: Not on file    Stress: Not on file   Relationships    Social connections     Talks on phone: Not on file     Gets together: Not on file     Attends Je[employer]ah's witness service: Not on file     Active member of club or organization: Not on file     Attends meetings of clubs or organizations: Not on file     Relationship status: Not on file   Other Topics Concern    Not on file   Social History Narrative    Not on file       MEDICATIONS & ALLERGIES:     Current Outpatient Medications on File Prior to Visit   Medication Sig Dispense Refill    folic acid (FOLVITE) 1 MG tablet Take 1 mg by mouth once daily.      furosemide (LASIX) 40 MG tablet Take 40 mg by mouth 2 (two) times daily.      multivit-iron-FA-calcium-mins 9 mg iron-400 mcg Tab tablet Take 1 tablet by mouth once daily. (Patient not taking: Reported on 7/14/2020) 30 tablet 3    pantoprazole (PROTONIX) 40 MG tablet Take 40 mg by mouth once daily.      propranoloL (INDERAL) 20 MG tablet Take 1 tablet (20 mg total) by mouth 2 (two) times daily. (Patient not taking: Reported on 7/14/2020) 60 tablet 11    sertraline (ZOLOFT) 25 MG tablet Take 25 mg by mouth once daily.      spironolactone (ALDACTONE) 50 MG tablet Take 50 mg by mouth once daily.      thiamine 100 MG tablet Take 1 tablet (100 mg total) by mouth once daily. (Patient not taking: Reported on 7/14/2020) 30 tablet 3     No current facility-administered medications on file prior to visit.         Review of patient's allergies indicates:  No Known Allergies    OBJECTIVE:     Vital  Signs:  There were no vitals filed for this visit.  There is no height or weight on file to calculate BMI.       Physical Exam  Vitals signs reviewed.   Constitutional:       General: She is not in acute distress.     Appearance: She is obese. She is ill-appearing.   HENT:      Head: Normocephalic.      Nose: Nose normal.      Mouth/Throat:      Mouth: Mucous membranes are moist.   Eyes:      Extraocular Movements: Extraocular movements intact.      Pupils: Pupils are equal, round, and reactive to light.   Neck:      Musculoskeletal: Normal range of motion.   Cardiovascular:      Rate and Rhythm: Normal rate and regular rhythm.      Pulses: Normal pulses.   Pulmonary:      Effort: Pulmonary effort is normal. No respiratory distress.      Breath sounds: Normal breath sounds.   Abdominal:      General: Bowel sounds are normal. There is distension.      Tenderness: There is abdominal tenderness. There is guarding.   Musculoskeletal: Normal range of motion.   Skin:     General: Skin is warm and dry.      Capillary Refill: Capillary refill takes less than 2 seconds.   Neurological:      General: No focal deficit present.      Mental Status: She is alert and oriented to person, place, and time.   Psychiatric:         Mood and Affect: Mood normal.         Behavior: Behavior normal.         Thought Content: Thought content normal.       Laboratory  Lab Results   Component Value Date    WBC 7.30 07/13/2020    HGB 9.2 (L) 07/13/2020    HGB 9.2 (L) 07/13/2020    HCT 27.5 (L) 07/13/2020    MCV 95 07/13/2020    PLT 54 (L) 07/13/2020     Lab Results   Component Value Date    INR 1.7 (H) 07/13/2020    INR 1.7 (H) 07/12/2020    INR 1.7 (H) 07/11/2020     No results found for: HGBA1C  No results for input(s): POCTGLUCOSE in the last 72 hours.    TRANSITION OF CARE:     Family and/or Caretaker present at visit?  No.  Diagnostic tests reviewed/disposition: No diagnosic tests pending after this hospitalization.  Disease/illness  education: Importance of Compliance with all prescribed medications and treatments, COVID Precautions/Social Distancing/Mask Use  Home health/community services discussion/referrals: Patient does not have home health established from hospital visit.  They do not need home health.  If needed, we will set up home health for the patient.   Establishment or re-establishment of referral orders for community resources: No other necessary community resources.   Discussion with other health care providers: No discussion with other health care providers necessary.     Transition of Care Visit:  I have reviewed and updated the history and problem list. I have reconciled the medication list. I have discussed the hospitalization and current medical issues, prognosis and plans with the patient/family.     I spent more than 50% of time discussing the care with the patient/family. Total Face-to-Face Encounter: 60 minutes.    Medications Reconciliation:   I have reconciled the patient's home medications and discharge medications with the patient/family. I have updated all changes. Refer to After-Visit Medication List.    Discharge plans, follow-up instructions, future appointments, provider contact information, indicators to seek emergency treatment and encouragement to call for any questions, concerns or clarification of the patient's plan of care explained to patient and/or caregiver(s), whom confirm understanding of provided information and endorse willingness to comply.     ASSESSMENT & PLAN:     Diagnoses and all orders for this visit:    Hepatic cirrhosis, unspecified hepatic cirrhosis type, unspecified whether ascites present  -  TC visit completed, all post discharge appointments clarified, meds reconciled  - follow-up appointment with GI today att 11:20, see HPI    Encounter for Support and Coordination of Transition of Care   - [facility] Nurse Practitioner to schedule home visit with patient in 2 weeks or  PRN    Were controlled substances prescribed?  No    Instructions for the patient:    Scheduled Follow-up :  Future Appointments   Date Time Provider Department [place]   7/20/2020 11:05 AM LABORATORY, [facility] LAB [facility]   7/20/2020 11:20 AM Cheri Naylor, DULCEP [facility] GASTRO [facility]   7/20/2020 12:00 PM Alex Lubin, IRENA [facility] [place]       After Visit Medication List :     Medication List          Accurate as of July 20, 2020  9:25 AM. If you have any questions, ask your nurse or doctor.            CONTINUE taking these medications    folic acid 1 MG tablet  Commonly known as: FOLVITE     furosemide 40 MG tablet  Commonly known as: LASIX     multivit-iron-FA-calcium-mins 9 mg iron-400 mcg Tab tablet  Take 1 tablet by mouth once daily.     pantoprazole 40 MG tablet  Commonly known as: PROTONIX     propranoloL 20 MG tablet  Commonly known as: INDERAL  Take 1 tablet (20 mg total) by mouth 2 (two) times daily.     sertraline 25 MG tablet  Commonly known as: ZOLOFT     spironolactone 50 MG tablet  Commonly known as: ALDACTONE     thiamine 100 MG tablet  Take 1 tablet (100 mg total) by mouth once daily.            Patient consent was obtained prior to treatment on this visit.    Signature:      Alex Lubin, MSN, APRN, FNP-C  [facility]    Attestation: Screening criteria to assess the level of the patient's risk for infection with COVID-19 as recommended by the CDC at the time of the above documented home visit concluded appropriateness to proceed. Universal precautions were maintained at all times, including provider use of >60% alcohol gel hand  immediately prior to entry and upon departing the patient's home as well as cleaning of equipment used in home visit with antibacterial/germicidal disposable wipes.     Smoking Cessation  A separate consultation session of greater than 10 minutes in duration was held with the patient to discuss current use products containing nicotine and  its detrimental impact on his/her health. The patient reports willingness to quit but has failed in the past. Resources offered include enrollment in a smoking cessation program and the prescription of products to taper levels of ingested nicotine (patches and gum products) with the ultimate goal of complete cessation. We set a mutually agreeable goal to continually lower nicotine use in a defined amount of time. Progress toward the goal will be assessed/revisited during the next provider visit.     With the consent of the patient and/or caregiver(s), an additional 20 minutes included a comprehensive discussion regarding Advanced Care Planning. Sources of emotional and spiritual support were identified and encouraged for involvement to assist in finalization of decisions regarding the patient's goals of care (Living Will). Topics discussed include statutory guidelines of the [facility] to determine/delineate the legal surrogate medical decision maker should the patient be deemed incompetent to self-direct medical care (next of kin vs.POA) and the required documentation to ensure legal validity thereof ([place] Directives/[place] Post).

## 2020-07-20 NOTE — PHYSICIAN QUERY
PT Name: John Wray  MR #: [id number]     SYSTEMIC INFECTIOUS PROCESS CLARIFICATION     CDS: Daisy JEFFRIES,RN        Contact information: [email or web address]  This form is a permanent document in the medical record.     Query Date: July 20, 2020    By submitting this query, we are merely seeking further clarification of documentation.  Please utilize your independent clinical judgment when addressing the question(s) below.  The Medical Record contains the following:  Indicators Supporting Clinical Findings Location in Medical Record   x HR         RR          BP        Temp Vital Signs (24h Range):  Temp:98.4 °F -100.3 °F   Pulse:37-94   Resp:4-40   SpO2:88 %-100 %   BP: ()/(27-96)      7/11/20  Pulmonology Progress Notes    Lactic Acid          Procalcitonin     x WBC           Bands          CRP  WBC: 14.63-->9.74-->8.86--->8.63--->7.30, 13.05 7/8---->7/13, 7/20/20 Lab   x Culture(s) Blood Culture, Routine: No growth after 5 days   7/9/20  Blood Culture Lab   x AMS, Confusion, LOC, etc. Positive for confusion and decreased concentration.   7/9/20 Hospital Medicine H&P    Organ Dysfunction/Failure     x Bacteremia or Sepsis / Septic Critical care was necessary to treat or prevent imminent or life-threatening deterioration of onr or more of the following conditions: Sepsis   7/9/20  Pulmonology Consults    Known or Suspected Source of Infection documented      (Failed) Outpatient Treatment     x Medication Ceftriaxone 2g/50 ml D5W  IVPB Intravenous Every 24 hours 7/9------->7/13 MAR    Treatment     x Other Decompensated HCV cirrhosis, metabolic acidosis, increased anion gap 7/13/20 Hospital Medicine Discharge Summary        Doctor, please specify the status of sepsis  associated with above clinical findings.    [   ] Sepsis due to unknown organism   [  x ] Severe Sepsis with Acute Organ Dysfunction/Failure (please specify organ dysfunction/failure): __LIVER_____   [   ] Other Infectious  Disease (please specify): __________   [   ] Sepsis Ruled Out   [  ] Clinically Undetermined       Present on admission (POA) status:   [ x  ] Yes (Y)            [  ] Clinically Undetermined (W)  [   ] No (N)              [   ] Documentation insufficient to determine if condition is POA (U)     Please document in your progress notes daily for the duration of treatment until resolved and include in your discharge summary.

## 2020-07-20 NOTE — LETTER
July 20, 2020      Duy Rosales PA-C  [address] [place] [address]           [facility]  [address] [facility] [place] [address]  Phone: [phone]  Fax: [phone]          Patient: John Wray   MR Number: [id number]   YOB: 1975   Date of Visit: 7/20/2020       Dear Duy Rosales:    Thank you for referring John Wray to me for evaluation. Attached you will find relevant portions of my assessment and plan of care.    If you have questions, please do not hesitate to call me. I look forward to following John Wray along with you.    Sincerely,    Cheri Naylor, [facility]    Enclosure  CC:  No Recipients    If you would like to receive this communication electronically, please contact [email or web address] or [phone] to request more information on [facility] access.    For providers and/or their staff who would like to refer a patient to [facility], please contact us through our one-stop-shop provider referral line, [facility] , at [phone].    If you feel you have received this communication in error or would no longer like to receive these types of communications, please e-mail [email or web address]

## 2020-07-20 NOTE — PHYSICIAN QUERY
"PT Name: John Wray  MR #: [id number]    Physician Query Form - Heart  Condition Clarification   CDS: Daisy JEFFRIES,RN        Contact information:[email or web address]  This form is a permanent document in the medical record.     Query Date: July 20, 2020    By submitting this query, we are merely seeking further clarification of documentation. Please utilize your independent clinical judgment when addressing the question(s) below.    The medical record contains the following   Indicators     Supporting Clinical Findings Location in Medical Record   x BNP  BNP: 2,829 7/8/20  Lab   x EF The estimated ejection fraction is 55%.   7/9/20 Echo Color Flow Doppler     x Radiology findings FINDINGS:  EKG leads overlie the chest. Oxygen tubing overlies the chest as well, which is rotated to the left. Reticular and ground-glass interstitial changes throughout the lungs. Possible tiny right effusion. The lungs are otherwise clear. The cardiac silhouette size is enlarged. The trachea is midline and the mediastinal width is normal. Negative for left effusion or pneumothorax. Pulmonary vasculature is congested            7/8/20 Chest AP Portable X-Ray   x Echo Results Severe concentric left ventricular hypertrophy.  Normal left ventricular systolic function. The  estimated ejection fraction is 55%.  Indeterminate left ventricular diastolic function.  Mild tricuspid regurgitation.  Septal wall has abnormal motion. Systolic  flattening of the interventricular septum  consistent with right ventricle pressure overload.  Severe left atrial enlargement.  Normal central venous pressure (3 mmHg).  The estimated PA systolic pressure is 54 mmHg.  Pulmonary hypertension present          7/9/20 Echo Color Flow Doppler    "Ascites" documented     x "SOB" or "FORTE" documented SOB (shortness of breath)   7/8/20 ED Provider Notes   x "Hypoxia" documented Acute respiratory failure with hypoxia   7/11/20 Pulmonology Progress Notes    " "Heart Failure documented     x "Edema" documented Acute pulmonary edema    7/8/20 ED Provider Notes   x Diuretics/Meds Furosemide injection 40 mg Intravenous Once 1 dose (s) given  Furosemide injection 40 mg Intravenous Once 1 dose (s) given  Furosemide injection 60 mg Intravenous ED 1 Time 1 dose(s) given  Furosemide injection 60 mg Intravenous ED 1 Time 1 dose(s) given 7/12/20 MAR    7/11/20 MAR    7/8/20 MAR    7/8/20 MAR    Treatment:     x Other:  Breath sounds: Wheezing (bilaterally) present.   7/10/20 Gastroenterology Progress Notes   Heart failure (HF) can be acute, chronic or both. It is generally further specificed as systolic, diastolic, or combined. Lastly, it is important to identify an underlying etiology if known or suspected.     Common clues to acute exacerbation:  Rapidly progressive symptoms (w/in 2 weeks of presentation), using IV diuretics to treat, using supplemental O2, pulmonary edema on Xray, MI w/in 4 weeks, and/or BNP >500    Systolic Heart Failure: is defined as chart documentation of a left ventricular ejection fraction (LVEF) less than 40%     Diastolic Heart Failure: is defined as a left ventricular ejection fraction (LVEF) greater than 40%   +      Evidence of diastolic dysfunction on echocardiography OR    Right heart catheterization wedge pressure above 12 mm Hg OR    Left heart catheterization left ventricular end diastolic pressure 18 mm Hg or above.    References: *American Heart Association    The clinical guidelines noted below are only system guidelines, and do not replace the providers clinical judgment.      Doctor, please specify the type of  heart failure associated with above clinical findings  [   ] Acute Diastolic Heart Failure - New diagnosis.  EF > 40%  and acute HF symptoms documented   [ x  ] Acute on Chronic Diastolic Heart Failure -    Pre-existing diastoic HF diagnosis.  EF > 40%  and acute HF symptoms documented       [   ] Chronic Diastolic Heart Failure - " Pre-existing diastolic HF diagnosis.  EF > 40%  without  acute HF symptoms documented   [   ] Other Type of Heart Failure (please specify type):   [   ] Heart Failure Ruled Out   [   ] Other (please specify):   [  ] Clinically Undetermined                           Please document in your progress notes daily for the duration of treatment until resolved and include in your discharge summary.

## 2020-07-20 NOTE — PATIENT INSTRUCTIONS
- [facility] at  to schedule follow-up visit with patient in 2 weeks or as needed.  - Continue all medications, treatments and therapies as ordered.   - Follow all instructions, recommendations as discussed.  - Maintain Safety Precautions at all times.  - Attend all medical appointments as scheduled.  - For worsening symptoms: call Primary Care Physician or Nurse Practitioner.  - For emergencies, call 911 or immediately report to the nearest emergency room.  - Limit Risks of environmental exposure to coronavirus as discussed including: social distancing, hand hygiene, and limiting departures from the home for necessities only.       Smoking Cessation  Consider our discussion regarding the hazardous effects of smoking on increasing risk of heart attack and stroke, worsening lung functions, and increasing cancer risk.  You are urged to stop smoking now. Implement my offer a nicotine taper by patch to help ease the craving to smoke. Set a goal to back down by one-two cigarettes per day until you no longer feel the urgency to smoke. Also consider my offer for enrollment in a smoking cessation program.

## 2020-07-20 NOTE — TELEPHONE ENCOUNTER
Location Screening:    If answers yes to any of the following, schedule at [facility] ONLY. If No, OK for either location.    1. Is there a diagnosis of heart failure, severe heart valve disease (aortic stenosis) or mechanical valve? no  a. Is the Left Ventricle Ejection Fraction <30% ? not applicable    2. Does the pt have pulmonary hypertension? no( pt not sure)   a. Is pulmonary arterial pressure gradient >50mmHg? not applicable   b. Is there evidence of right ventricular dysfunction? not applicable    3. Does the pt have achalasia? no    4. Any history of negative reaction to anesthesia? no   a. Myasthenia gravis? not applicable   b. Malignant hyperthermia? not applicable   c. Other? not applicable    5. Is procedure for esophageal banding? no      COVID Screening    1. Have you had a fever in the last 7 days or have you used fever reducing medicines for a fever in the last 7 days?  no    2. Are you experiencing shortness of breath, cough, muscle aches, loss of taste or loss of smell?  no    If answered yes to questions 1 and 2, the patient must seek medical attention with their PCP.  Do not schedule their procedure.     3. Are you residing with anyone who has tested positive for Covid?  no    If answered yes to question 3, recommend 14 day self-quarantine from the date of relative's positive test and place special needs note in the depot.  Wait to schedule.     ENDO screening    1. Have you been admitted for cardiac, kidney or pulmonary causes to the hospital in the past 3 months? no   If yes, schedule an appointment with PCP before scheduling endoscopic procedure.     2. Have you had a stent placed in the last 12 months? no   If yes, for a screening visit, cancel and message the ordering provider.  The patient will need a new order when the time is appropriate.     3. Have you had a stroke or heart attack in the past 6 months? no   If yes, cancel and refer patient to ordering provider for clearance, also message  "ordering provider to inform.     4. Have you had any chest pain in the past 3 months? no   If yes, Have you been evaluated by your PCP and/or cardiologist and it was determined to not be heart related? not applicable   If No, Pt needs to be seen by PCP or Cardiologist .  Pt can be scheduled once clearance obtained by either of those providers.     5. Do you take prescription weight loss medications?  no   If yes, must stop for 2 weeks prior to procedure.     6. Have you been diagnosed with diverticulitis within the past 3 months? no   If yes, must have been seen by GI within the last 3 months, if not schedule with GI [facility].    If pt has been seen by GI, schedule procedure 8-12 weeks post antibiotic treatment.     7. Are you on Dialysis? no  If yes, schedule procedure for the day AFTER dialysis.  Appt time should be 9am or later, patient arrival time is 2 hours prior.  Nulytely or miralax prep for all patients with kidney disease.     8. Are you diabetic?  no   If yes, schedule morning appt. Advise pt to hold all diabetic meds day of procedure.     9. If pt is older than 80 years of age and HAS NOT been seen by GI or PCP within the last 6 months, needs appt with GI [facility].   If pt has been seen by the GI provider or PCP within the past 6  months AND meets criteria, schedule procedure AND send message to the endoscopist.     10. Is patient on a "high risk" medication (blood thinner/antiplatelet agent)?  no   If yes, has cardiac clearance been obtained within the last 60 days? N/A   If no, a new clearance needs to be obtained.     Final Questions:    1.I have reviewed the last colonoscopy for recommendations regarding next procedure bowel prep.  yes  2. I have reviewed medications and allergies.  yes  3. I have verified the pharmacy information and appropriate prep sent if needed. yes  4. Prep instructions have been mailed or sent to portal per patient request. yes        All schedulers will have ability to reach out to the " ordering GI provider to clarify any issues.

## 2020-08-04 ENCOUNTER — OFFICE VISIT (OUTPATIENT)
Dept: GASTROENTEROLOGY | Facility: CLINIC | Age: 45
End: 2020-08-04
Payer: MEDICAID

## 2020-08-04 VITALS
DIASTOLIC BLOOD PRESSURE: 70 MMHG | BODY MASS INDEX: 29.86 KG/M2 | WEIGHT: 190.25 LBS | HEART RATE: 55 BPM | SYSTOLIC BLOOD PRESSURE: 140 MMHG | HEIGHT: 67 IN

## 2020-08-04 DIAGNOSIS — K74.69 DECOMPENSATED HCV CIRRHOSIS: Primary | ICD-10-CM

## 2020-08-04 DIAGNOSIS — B19.20 DECOMPENSATED HCV CIRRHOSIS: Primary | ICD-10-CM

## 2020-08-04 DIAGNOSIS — R93.5 ABNORMAL US (ULTRASOUND) OF ABDOMEN: ICD-10-CM

## 2020-08-04 DIAGNOSIS — I85.11 SECONDARY ESOPHAGEAL VARICES WITH BLEEDING: ICD-10-CM

## 2020-08-04 PROCEDURE — 99213 OFFICE O/P EST LOW 20 MIN: CPT | Mod: PBBFAC | Performed by: NURSE PRACTITIONER

## 2020-08-04 PROCEDURE — 99999 PR PBB SHADOW E&M-EST. PATIENT-LVL III: CPT | Mod: PBBFAC,,, | Performed by: NURSE PRACTITIONER

## 2020-08-04 PROCEDURE — 99214 OFFICE O/P EST MOD 30 MIN: CPT | Mod: S$PBB,,, | Performed by: NURSE PRACTITIONER

## 2020-08-04 PROCEDURE — 99999 PR PBB SHADOW E&M-EST. PATIENT-LVL III: ICD-10-PCS | Mod: PBBFAC,,, | Performed by: NURSE PRACTITIONER

## 2020-08-04 PROCEDURE — 99214 PR OFFICE/OUTPT VISIT, EST, LEVL IV, 30-39 MIN: ICD-10-PCS | Mod: S$PBB,,, | Performed by: NURSE PRACTITIONER

## 2020-08-04 RX ORDER — SPIRONOLACTONE 50 MG/1
50 TABLET, FILM COATED ORAL DAILY
Qty: 30 TABLET | Refills: 11 | Status: SHIPPED | OUTPATIENT
Start: 2020-08-04 | End: 2021-08-04

## 2020-08-04 RX ORDER — FUROSEMIDE 20 MG/1
20 TABLET ORAL 2 TIMES DAILY
Qty: 60 TABLET | Refills: 11 | Status: ON HOLD | OUTPATIENT
Start: 2020-08-04 | End: 2020-11-29 | Stop reason: SDUPTHER

## 2020-08-05 NOTE — PROGRESS NOTES
Clinic Follow Up:  [facility] Follow Up Note    Reason for Follow Up:  The primary encounter diagnosis was Decompensated HCV cirrhosis. Diagnoses of Secondary esophageal varices with bleeding and Abnormal US (ultrasound) of abdomen were also pertinent to this visit.    PCP: Provider [facility]       HPI:  This is a 44 y.o. female here for follow up of the above  Pt states that since her last visit, she has been feeling overall stable.    She has remained sober.    She has had a slow return of ascites.  Not currently on any diuretics.  Previous labs show improved creatinine.   AFP WNL.  She was unable to get the previously ordered CT due to insurance issues.  Will attempt to get completed ASAP.   No nausea or vomiting.  No change in bowel pattern.  No melena or hematochezia. No weight loss.  No upper GI bleeding.  No overt confusion.        Review of Systems   Constitutional: Positive for malaise/fatigue. Negative for chills, fever and weight loss.   Respiratory: Negative for cough.    Cardiovascular: Negative for chest pain.   Gastrointestinal:        Per HPI   Musculoskeletal: Negative for myalgias.   Skin: Negative for itching and rash.   Neurological: Negative for headaches.   Psychiatric/Behavioral: The patient is not nervous/anxious.        Medical History:  Past Medical History:   Diagnosis Date    Alcohol abuse     Cirrhosis     Depression     Hypertension     Portal hypertension with esophageal varices        Surgical History:   Past Surgical History:   Procedure Laterality Date     SECTION      ESOPHAGOGASTRODUODENOSCOPY      ESOPHAGOGASTRODUODENOSCOPY N/A 7/10/2020    Procedure: EGD (ESOPHAGOGASTRODUODENOSCOPY);  Surgeon: Edwige Spicer MD;  Location: [facility];  Service: Endoscopy;  Laterality: N/A;       Family History:   History reviewed. No pertinent family history.    Social History:   Social History     Tobacco Use    Smoking status: Current Every Day Smoker      "Packs/day: 0.50   Substance Use Topics    Alcohol use: Not Currently    Drug use: Never       Allergies: Reviewed    Home Medications:  Current Outpatient Medications on File Prior to Visit   Medication Sig Dispense Refill    folic acid (FOLVITE) 1 MG tablet Take 1 tablet (1 mg total) by mouth once daily. 30 tablet 2    propranoloL (INDERAL) 20 MG tablet Take 1 tablet (20 mg total) by mouth 2 (two) times daily. 60 tablet 11    thiamine 100 MG tablet Take 1 tablet (100 mg total) by mouth once daily. 30 tablet 3    multivit-iron-FA-calcium-mins 9 mg iron-400 mcg Tab tablet Take 1 tablet by mouth once daily. (Patient not taking: Reported on 8/4/2020) 30 tablet 3    nicotine (NICODERM CQ) 14 mg/24 hr Place 1 patch onto the skin once daily. for 7 days (Patient not taking: Reported on 8/4/2020) 7 patch 0    [START ON 8/11/2020] nicotine (NICODERM CQ) 7 mg/24 hr Place 1 patch onto the skin once daily. for 7 days (Patient not taking: Reported on 8/4/2020) 7 patch 0    pantoprazole (PROTONIX) 40 MG tablet Take 40 mg by mouth once daily.      sertraline (ZOLOFT) 25 MG tablet Take 25 mg by mouth once daily.       No current facility-administered medications on file prior to visit.        Physical Exam:  Vital Signs:  BP (!) 140/70   Pulse (!) 55   Ht 5' 7" (1.702 m)   Wt 86.3 kg (190 lb 4.1 oz)   LMP  (LMP Unknown)   BMI 29.80 kg/m²   Body mass index is 29.8 kg/m².  Physical Exam   Constitutional: She is oriented to person, place, and time. She appears well-developed.   HENT:   Head: Normocephalic.   Eyes: No scleral icterus.   Neck: Normal range of motion.   Cardiovascular: Normal rate.   Pulmonary/Chest: Effort normal.   Abdominal: She exhibits distension (ascites). There is abdominal tenderness.   Musculoskeletal:         General: Edema (BLE) present.   Neurological: She is alert and oriented to person, place, and time.   Skin: Skin is dry.   Psychiatric: She has a normal mood and affect.   Vitals " reviewed.      Labs: Pertinent labs reviewed.  MELD-Na score: 24 at 7/20/2020 12:26 PM  MELD score: 22 at 7/20/2020 12:26 PM  Calculated from:  Serum Creatinine: 1.2 mg/dL at 7/20/2020 12:26 PM  Serum Sodium: 134 mmol/L at 7/20/2020 12:26 PM  Total Bilirubin: 10.3 mg/dL at 7/20/2020 12:26 PM  INR(ratio): 1.5 at 7/20/2020 12:26 PM  Age: 44 years 6 months  EV: EGD 7/10 with large EV, banded incomplete, repeat 4 weeks  HCC: US 7/2020 Ill-defined hypoechoic structure at the alisia hepatis region measuring 2.8 cm, nonspecific, possibly bowel versus alisia hepatis lymph node, among others    Assessment:  1. Decompensated HCV cirrhosis    2. Secondary esophageal varices with bleeding    3. Abnormal US (ultrasound) of abdomen        Recommendations:  Stable without any new complaints  - since creatinine has improved, will start on low dose diuretics with close monitoring of electrolytes and kidney function.   - If unable to titrate diuretics or if ascites does not improve, will plan for paracentesis with fluid analysis.   - maintain sobriety  - will attempt to get CT previously ordered approved and completed for further evaluation of the previously ill-defined area seen on US.       Return to Clinic:  2 weeks for close monitoring.   MELD remains elevated, may need to see Dr. Spicer to discuss transplant evaluation.  Although, has not been sober for more than a month.

## 2020-08-10 ENCOUNTER — TELEPHONE (OUTPATIENT)
Dept: PREADMISSION TESTING | Facility: HOSPITAL | Age: 45
End: 2020-08-10

## 2020-08-10 ENCOUNTER — LAB VISIT (OUTPATIENT)
Dept: LAB | Facility: HOSPITAL | Age: 45
End: 2020-08-10
Attending: NURSE PRACTITIONER
Payer: MEDICAID

## 2020-08-10 DIAGNOSIS — B19.20 DECOMPENSATED HCV CIRRHOSIS: ICD-10-CM

## 2020-08-10 DIAGNOSIS — I85.11 SECONDARY ESOPHAGEAL VARICES WITH BLEEDING: ICD-10-CM

## 2020-08-10 DIAGNOSIS — K74.69 DECOMPENSATED HCV CIRRHOSIS: ICD-10-CM

## 2020-08-10 LAB
INR PPP: 1.6 (ref 0.8–1.2)
PROTHROMBIN TIME: 17 SEC (ref 9–12.5)

## 2020-08-10 PROCEDURE — 85610 PROTHROMBIN TIME: CPT

## 2020-08-10 PROCEDURE — 36415 COLL VENOUS BLD VENIPUNCTURE: CPT

## 2020-08-10 PROCEDURE — 80053 COMPREHEN METABOLIC PANEL: CPT

## 2020-08-10 NOTE — TELEPHONE ENCOUNTER
Called to speak with patient, but daughter answered the phone as patient is unavailable. I asked if there were a better number to reach the patient at, but she said that she would take a message and deliver to patient. Callback number also left to have patient call back in regards to EGD location change to [facility]. Patient is an esophageal varices screen.

## 2020-08-11 LAB
ALBUMIN SERPL BCP-MCNC: 2.1 G/DL (ref 3.5–5.2)
ALP SERPL-CCNC: 179 U/L (ref 55–135)
ALT SERPL W/O P-5'-P-CCNC: 20 U/L (ref 10–44)
ANION GAP SERPL CALC-SCNC: 6 MMOL/L (ref 8–16)
AST SERPL-CCNC: 72 U/L (ref 10–40)
BILIRUB SERPL-MCNC: 5.3 MG/DL (ref 0.1–1)
BUN SERPL-MCNC: 13 MG/DL (ref 6–20)
CALCIUM SERPL-MCNC: 8.2 MG/DL (ref 8.7–10.5)
CHLORIDE SERPL-SCNC: 103 MMOL/L (ref 95–110)
CO2 SERPL-SCNC: 29 MMOL/L (ref 23–29)
CREAT SERPL-MCNC: 1.3 MG/DL (ref 0.5–1.4)
EST. GFR  (AFRICAN AMERICAN): 58.1 ML/MIN/1.73 M^2
EST. GFR  (NON AFRICAN AMERICAN): 50.4 ML/MIN/1.73 M^2
GLUCOSE SERPL-MCNC: 101 MG/DL (ref 70–110)
POTASSIUM SERPL-SCNC: 3.9 MMOL/L (ref 3.5–5.1)
PROT SERPL-MCNC: 9.2 G/DL (ref 6–8.4)
SODIUM SERPL-SCNC: 138 MMOL/L (ref 136–145)

## 2020-08-18 ENCOUNTER — OFFICE VISIT (OUTPATIENT)
Dept: GASTROENTEROLOGY | Facility: CLINIC | Age: 45
End: 2020-08-18
Payer: MEDICAID

## 2020-08-18 VITALS
WEIGHT: 181.19 LBS | BODY MASS INDEX: 28.44 KG/M2 | HEIGHT: 67 IN | HEART RATE: 60 BPM | DIASTOLIC BLOOD PRESSURE: 76 MMHG | SYSTOLIC BLOOD PRESSURE: 156 MMHG

## 2020-08-18 DIAGNOSIS — I85.11 SECONDARY ESOPHAGEAL VARICES WITH BLEEDING: ICD-10-CM

## 2020-08-18 DIAGNOSIS — B19.20 DECOMPENSATED HCV CIRRHOSIS: Primary | ICD-10-CM

## 2020-08-18 DIAGNOSIS — K74.69 DECOMPENSATED HCV CIRRHOSIS: Primary | ICD-10-CM

## 2020-08-18 DIAGNOSIS — R93.5 ABNORMAL US (ULTRASOUND) OF ABDOMEN: ICD-10-CM

## 2020-08-18 PROCEDURE — 99213 OFFICE O/P EST LOW 20 MIN: CPT | Mod: PBBFAC | Performed by: NURSE PRACTITIONER

## 2020-08-18 PROCEDURE — 99999 PR PBB SHADOW E&M-EST. PATIENT-LVL III: CPT | Mod: PBBFAC,,, | Performed by: NURSE PRACTITIONER

## 2020-08-18 PROCEDURE — 99999 PR PBB SHADOW E&M-EST. PATIENT-LVL III: ICD-10-PCS | Mod: PBBFAC,,, | Performed by: NURSE PRACTITIONER

## 2020-08-18 PROCEDURE — 99214 PR OFFICE/OUTPT VISIT, EST, LEVL IV, 30-39 MIN: ICD-10-PCS | Mod: S$PBB,,, | Performed by: NURSE PRACTITIONER

## 2020-08-18 PROCEDURE — 99214 OFFICE O/P EST MOD 30 MIN: CPT | Mod: S$PBB,,, | Performed by: NURSE PRACTITIONER

## 2020-08-18 NOTE — Clinical Note
After reviewing labs again, I'd like for her to repeat the CBC, hepatic function panel and INR in 3 weeks.  Keep the other labs and F/U with Dr. Larkin as already scheduled.

## 2020-08-18 NOTE — PROGRESS NOTES
Clinic Follow Up:  [facility] Follow Up Note    Reason for Follow Up:  The primary encounter diagnosis was Decompensated HCV cirrhosis. Diagnoses of Secondary esophageal varices with bleeding and Abnormal US (ultrasound) of abdomen were also pertinent to this visit.    PCP: To Obtain Unable       HPI:  This is a 44 y.o. female here for follow up of the above  Pt states that since her last visit, she is feeling better.  She has had a 9 pound weight loss with the addition of diuretics.  Electrolytes are stable.   EGD scheduled for later this week.  Has not yet completed CT.    Has had improved ascites and BLE.   Denies any abdominal pain.  No nausea or vomiting.  No change in bowel pattern.  No melena or hematochezia. No weight loss.  No upper GI bleeding.  No overt confusion.  Reports she has been sober for 3-4 months.  She is unsure of actual date.         Review of Systems   Constitutional: Negative for chills, fever, malaise/fatigue and weight loss.   Respiratory: Negative for cough.    Cardiovascular: Negative for chest pain.   Gastrointestinal:        Per HPI   Musculoskeletal: Negative for myalgias.   Skin: Negative for itching and rash.   Neurological: Negative for headaches.   Psychiatric/Behavioral: The patient is not nervous/anxious.        Medical History:  Past Medical History:   Diagnosis Date    Alcohol abuse     Cirrhosis     Depression     Hypertension     Portal hypertension with esophageal varices        Surgical History:   Past Surgical History:   Procedure Laterality Date     SECTION      ESOPHAGOGASTRODUODENOSCOPY      ESOPHAGOGASTRODUODENOSCOPY N/A 7/10/2020    Procedure: EGD (ESOPHAGOGASTRODUODENOSCOPY);  Surgeon: Edwige Spicer MD;  Location: [facility];  Service: Endoscopy;  Laterality: N/A;       Family History:   No family history on file.    Social History:   Social History     Tobacco Use    Smoking status: Current Every Day Smoker     Packs/day: 0.50  "  Substance Use Topics    Alcohol use: Not Currently    Drug use: Never       Allergies: Reviewed    Home Medications:  Current Outpatient Medications on File Prior to Visit   Medication Sig Dispense Refill    folic acid (FOLVITE) 1 MG tablet Take 1 tablet (1 mg total) by mouth once daily. 30 tablet 2    furosemide (LASIX) 20 MG tablet Take 1 tablet (20 mg total) by mouth 2 (two) times daily. 60 tablet 11    propranoloL (INDERAL) 20 MG tablet Take 1 tablet (20 mg total) by mouth 2 (two) times daily. 60 tablet 11    sertraline (ZOLOFT) 25 MG tablet Take 25 mg by mouth once daily.      thiamine 100 MG tablet Take 1 tablet (100 mg total) by mouth once daily. 30 tablet 3    multivit-iron-FA-calcium-mins 9 mg iron-400 mcg Tab tablet Take 1 tablet by mouth once daily. (Patient not taking: Reported on 8/4/2020) 30 tablet 3    nicotine (NICODERM CQ) 7 mg/24 hr Place 1 patch onto the skin once daily. for 7 days (Patient not taking: Reported on 8/4/2020) 7 patch 0    pantoprazole (PROTONIX) 40 MG tablet Take 40 mg by mouth once daily.      spironolactone (ALDACTONE) 50 MG tablet Take 1 tablet (50 mg total) by mouth once daily. (Patient not taking: Reported on 8/18/2020) 30 tablet 11     No current facility-administered medications on file prior to visit.        Physical Exam:  Vital Signs:  BP (!) 156/76   Pulse 60   Ht 5' 7" (1.702 m)   Wt 82.2 kg (181 lb 3.5 oz)   LMP  (LMP Unknown)   BMI 28.38 kg/m²   Body mass index is 28.38 kg/m².  Physical Exam   Constitutional: She is oriented to person, place, and time. She appears well-developed and well-nourished.   HENT:   Head: Normocephalic.   Eyes: No scleral icterus.   Neck: Normal range of motion.   Cardiovascular: Normal rate.   Pulmonary/Chest: Effort normal.   Abdominal: She exhibits no distension.   Musculoskeletal:         General: No edema.   Neurological: She is alert and oriented to person, place, and time.   Skin: Skin is dry.   Psychiatric: She has a " normal mood and affect.   Vitals reviewed.      Labs: Pertinent labs reviewed.  MELD-Na score: 21 at 8/10/2020 12:36 PM  MELD score: 21 at 8/10/2020 12:36 PM  Calculated from:  Serum Creatinine: 1.3 mg/dL at 8/10/2020 12:36 PM  Serum Sodium: 138 mmol/L (Rounded to 137 mmol/L) at 8/10/2020 12:36 PM  Total Bilirubin: 5.3 mg/dL at 8/10/2020 12:36 PM  INR(ratio): 1.6 at 8/10/2020 12:36 PM  Age: 44 years 7 months    Assessment:  1. Decompensated HCV cirrhosis    2. Secondary esophageal varices with bleeding    3. Abnormal US (ultrasound) of abdomen        Recommendations:  Stable with improved ascites and BLE with current dose of diuretics  - continue current medications.   - continue with plan for EGD later this week for EV screening.   - will schedule previously ordered CT for further evaluation of ill-defined mass seen on US.   - plan for labs in 3 weeks for trending, given the slight increase in INR. Repeat labs, including PETH in 6-8 weeks with a visit with Dr. Spicer for further management and possible transplant evaluation.       Return to Clinic:    6-8 weeks with Dr. Spicer.

## 2020-08-19 ENCOUNTER — CARE AT HOME (OUTPATIENT)
Dept: HOME HEALTH SERVICES | Facility: CLINIC | Age: 45
End: 2020-08-19
Payer: MEDICAID

## 2020-08-19 VITALS
TEMPERATURE: 97 F | HEART RATE: 68 BPM | RESPIRATION RATE: 16 BRPM | OXYGEN SATURATION: 98 % | SYSTOLIC BLOOD PRESSURE: 136 MMHG | DIASTOLIC BLOOD PRESSURE: 63 MMHG

## 2020-08-19 DIAGNOSIS — Z72.0 NICOTINE ABUSE: ICD-10-CM

## 2020-08-19 DIAGNOSIS — K74.60 HEPATIC CIRRHOSIS, UNSPECIFIED HEPATIC CIRRHOSIS TYPE, UNSPECIFIED WHETHER ASCITES PRESENT: Primary | ICD-10-CM

## 2020-08-19 PROCEDURE — 99349 HOME/RES VST EST MOD MDM 40: CPT | Mod: S$GLB,,, | Performed by: NURSE PRACTITIONER

## 2020-08-19 PROCEDURE — 99349 PR HOME VISIT,ESTAB PATIENT,LEVEL III: ICD-10-PCS | Mod: S$GLB,,, | Performed by: NURSE PRACTITIONER

## 2020-08-19 NOTE — PROGRESS NOTES
"[facility]  Medical Home Visit    Visit Date: 8/19/2020  Encounter Provider: Alex Allen NP  PCP:  No primary care provider on file.    Subjective:      Patient ID: John Wray is a 44 y.o. female.    Consult Requested By:  Alex Allen  Reason for Consult: Medical Visit by Home Care Provider    The patient is being seen at home due to a physical debility that presents a taxing effort to leave the home, to mitigate high risk of hospital readmission or due to the limited availability of reliable or safe options for transportation to the point of access to the provider. The visit meets the criteria for medical necessity as defined by CMS as "health-care services needed to prevent, diagnose, or treat an illness, injury, condition, disease, or its symptoms and that meet accepted standards of medicine." Prior to treatment on this visit the chart was reviewed and patient consent was obtained.    Chief Complaint: Follow-up for chronic medical conditions and medication review  HPI    Today:  Ms. John Wray is a 44 y.o. female John presents at baseline state of health as reported by patient and caregiver. VSS. Denies any acute issues, concerns or complaints to address on today's visit. Reports taking all medications as prescribed. No other needs identified at this time. Risks of environmental exposure to coronavirus discussed including: social distancing, hand hygiene, and limiting departures from the home for necessities only.  Reports understanding and willingness to comply.      Today:  Ms. John Wray is a 44 y.o. female is being seen and examined at home today for follow up to her transitional care visit to the home environment post-discharge from inpatient hospitalization. John presents at baseline state of health as reported by patient. VSS. No acute distress. Denies any signs of bleeding, no blood noted in her stool. She continues to report abdominal discomfort. Abdomen " "in protuberant. She has a follow-up appointment with GI at in AM for EGD.  She has a follow up appointment with her [facility] Denies any other acute issues, concerns or complaints to address on today's visit. Reports taking all medications as prescribed. She reports abstinence from ETOH ingestion since discharge.She also has reported that she has continued to wean her cigarette intake to 2-3/day with plans of complete abstinence as soon as possible.     Risks of environmental exposure to coronavirus discussed including: social distancing, hand hygiene, and limiting departures from the home for necessities only.  Reports understanding and willingness to comply.      Review of Systems   Constitutional: Positive for fatigue ("always"). Negative for chills and fever.   HENT: Negative.  Negative for nosebleeds.    Respiratory: Positive for cough (chronic). Negative for shortness of breath.    Cardiovascular: Negative for chest pain and palpitations.   Gastrointestinal: Positive for abdominal distention and abdominal pain. Negative for anal bleeding, blood in stool, constipation, diarrhea, nausea and vomiting.   Skin: Negative.    Neurological: Positive for weakness. Negative for dizziness and light-headedness.   Psychiatric/Behavioral: Negative.      Assessments:  · Environmental: single story home, no steps to enter, adequate lighting and temeprature control  · Functional Status: Independent with ADL's/IADL's, ambulates with assistance of a cane/walker, continent of bowel and bladder  · Safety: Fall Precautions, COVID Precautions/Social Distancing/Mask Use  · Nutritional: Adequate  · Home Health: none  · DME/Supplies: none     Objective:     Vitals:    08/19/20 0940   BP: 136/63   Pulse: 68   Resp: 16   Temp: 97 °F (36.1 °C)   TempSrc: Temporal   SpO2: 98%   PainSc: 0-No pain     There is no height or weight on file to calculate BMI.    Physical Exam  Vitals signs reviewed.   Constitutional:      "  General: She is not in acute distress.     Appearance: She is obese. She is ill-appearing.   HENT:      Head: Normocephalic.      Nose: Nose normal.      Mouth/Throat:      Mouth: Mucous membranes are moist.   Eyes:      Extraocular Movements: Extraocular movements intact.      Pupils: Pupils are equal, round, and reactive to light.   Neck:      Musculoskeletal: Normal range of motion.   Cardiovascular:      Rate and Rhythm: Normal rate and regular rhythm.      Pulses: Normal pulses.   Pulmonary:      Effort: Pulmonary effort is normal. No respiratory distress.      Breath sounds: Normal breath sounds.   Abdominal:      General: Bowel sounds are normal. There is distension.      Tenderness: There is abdominal tenderness. There is guarding.   Musculoskeletal: Normal range of motion.   Skin:     General: Skin is warm and dry.      Capillary Refill: Capillary refill takes less than 2 seconds.   Neurological:      General: No focal deficit present.      Mental Status: She is alert and oriented to person, place, and time.   Psychiatric:         Mood and Affect: Mood normal.         Behavior: Behavior normal.         Thought Content: Thought content normal.      Assessment:     1. Hepatic cirrhosis, unspecified hepatic cirrhosis type, unspecified whether ascites present    2. Follow -up  3. Nicotine abuse  Plan:     Ethical / Legal: Advance Care Planning   Capacity to make medical decisions:  yes, Conflict no  · Surrogate decision maker:  Name Kalee, Relationship: daughter  · Advance Directives:  none  · HCPOA: none  · [place]:  On file  · Code Status:  full    Advanced Care Directives, HCPOA and [place] forms left in the home for family review, discussion and signing with instructions to return upon their next provider encounter for inclusion to the medical record.        John was seen today for follow-up.    Diagnoses and all orders for this visit:    Hepatic cirrhosis, unspecified hepatic cirrhosis type,  unspecified whether ascites present  - EGD in AM with [facility]    Encounter for Medical Follow-Up and Medication Review   - [facility] Nurse Practitioner to schedule home visit with  PRN.  - follow-up with your PCP, Dr. Jones as scheduled    Nicotine abuse, improved  - continue to wean daily use    Were controlled substances prescribed?  No    Follow Up Appointments:   Future Appointments   Date Time Provider Department Center   8/26/2020 10:50 AM [facility] CT1 LIMIT 500 LBS [facility] CT SCAN [facility]   8/27/2020 11:00 AM Jamey Keller MD [facility] HEM ONC [facility]   10/14/2020 11:00 AM Edwige Spicer MD [facility] GASTRO [facility]   4/14/2021 10:45 AM LABORATORY, [facility] LAB [facility]       Attestation: Screening criteria to assess the level of the patient's risk for infection with COVID-19 as recommended by the CDC at the time of the above documented home visit concluded appropriateness to proceed. Universal precautions were maintained at all times, including provider use of >60% alcohol gel hand  immediately prior to entry and upon departing the patient's home as well as cleaning of equipment used in home visit with antibacterial/germicidal disposable wipes.     Signature:    Alex Lubin, MSN, APRN, FNP-C  [facility]    Total face-to-face time was 51 min, >50% of this was spent on counseling and coordination of care. The following issues were discussed: primary and secondary diagnoses, co-morbidities, prescribed medications, treatment modalities, importance of compliance with medical advice and directives for follow-up care     Smoking Cessation  A separate consultation session of greater than 10 minutes in duration was held with the patient to discuss current use of products containing nicotine and its detrimental impact on his/her health. The patient reports willingness to quit but has failed in the past. Resources offered include enrollment in a smoking cessation program and the  prescription of products to taper levels of ingested nicotine (patches and gum products) with the ultimate goal of complete cessation. We set a mutually agreeable goal to continually lower nicotine use in a defined amount of time. Progress toward the goal will be assessed/revisited during the next provider visit. A list of websites was provided to the patient for further information.

## 2020-08-21 ENCOUNTER — TELEPHONE (OUTPATIENT)
Dept: ENDOSCOPY | Facility: HOSPITAL | Age: 45
End: 2020-08-21

## 2020-08-21 NOTE — TELEPHONE ENCOUNTER
Attempted to schedule procedure with patient, was told she was not available.  Left message to call office at mobile number and unable to leave a message a home number.

## 2020-08-21 NOTE — TELEPHONE ENCOUNTER
----- Message from Felicia Victoria sent at 8/20/2020  9:02 AM CDT -----  Regarding: wrong location  Contact: Patient  Patient went to the [place] location, will need to reschedule procedure. Please call at Ph .[phone]

## 2020-08-24 ENCOUNTER — TELEPHONE (OUTPATIENT)
Dept: GASTROENTEROLOGY | Facility: CLINIC | Age: 45
End: 2020-08-24

## 2020-08-24 DIAGNOSIS — K76.9 LIVER DISEASE, UNSPECIFIED: ICD-10-CM

## 2020-08-24 DIAGNOSIS — K76.0 FATTY LIVER: ICD-10-CM

## 2020-08-25 ENCOUNTER — TELEPHONE (OUTPATIENT)
Dept: GASTROENTEROLOGY | Facility: CLINIC | Age: 45
End: 2020-08-25

## 2020-08-25 ENCOUNTER — TELEPHONE (OUTPATIENT)
Dept: RADIOLOGY | Facility: HOSPITAL | Age: 45
End: 2020-08-25

## 2020-08-25 NOTE — TELEPHONE ENCOUNTER
Lydia CRENSHAW MA spoke with patient and advised that we would place signed paperwork in the mail on tomorrow, 08/26/2020.

## 2020-08-25 NOTE — TELEPHONE ENCOUNTER
----- Message from Lydia Walden MA sent at 8/25/2020 10:23 AM CDT -----  Contact: self  Any update on this?   ----- Message -----  From: Klarissa Rucker  Sent: 8/25/2020   9:15 AM CDT  To: [facility] Staff    Requesting call back regarding questions on if paperwork has been mailed to social security office. Please call back at [phone].

## 2020-08-27 ENCOUNTER — TELEPHONE (OUTPATIENT)
Dept: HEMATOLOGY/ONCOLOGY | Facility: CLINIC | Age: 45
End: 2020-08-27

## 2020-08-27 NOTE — TELEPHONE ENCOUNTER
Spoke to the patient she is having transportation issue due to the incline weather. Patient has elected to be rescheduled for 9/2/20 @ 4pm at [facility] per the patient. I verbalized my understanding.

## 2020-08-28 ENCOUNTER — TELEPHONE (OUTPATIENT)
Dept: GASTROENTEROLOGY | Facility: CLINIC | Age: 45
End: 2020-08-28

## 2020-08-28 NOTE — TELEPHONE ENCOUNTER
----- Message from Lydia Walden MA sent at 8/28/2020  2:25 PM CDT -----    ----- Message -----  From: Lissa Kelly  Sent: 8/28/2020  11:20 AM CDT  To: [facility] Staff    Pt calling regarding her paperwork that was supposed to go to the social security office she want to check the status of the paperwork. [phone]         Thanks   Lissa Kelly

## 2020-08-31 ENCOUNTER — TELEPHONE (OUTPATIENT)
Dept: GASTROENTEROLOGY | Facility: CLINIC | Age: 45
End: 2020-08-31

## 2020-08-31 NOTE — TELEPHONE ENCOUNTER
----- Message from ALEX De La Garza sent at 8/18/2020  1:05 PM CDT -----  After reviewing labs again, I'd like for her to repeat the CBC, hepatic function panel and INR in 3 weeks.  Keep the other labs and F/U with Dr. Larkin as already scheduled.

## 2020-09-01 ENCOUNTER — TELEPHONE (OUTPATIENT)
Dept: RADIOLOGY | Facility: HOSPITAL | Age: 45
End: 2020-09-01

## 2020-09-02 ENCOUNTER — TELEPHONE (OUTPATIENT)
Dept: HEMATOLOGY/ONCOLOGY | Facility: CLINIC | Age: 45
End: 2020-09-02

## 2020-09-02 NOTE — TELEPHONE ENCOUNTER
Patient no showed appt I have her rescheduled with the first available provider. Patient daughter confirmed her mother appts with me. Patient daughter verbalized understanding.

## 2020-09-09 ENCOUNTER — TELEPHONE (OUTPATIENT)
Dept: HEMATOLOGY/ONCOLOGY | Facility: CLINIC | Age: 45
End: 2020-09-09

## 2020-09-10 ENCOUNTER — TELEPHONE (OUTPATIENT)
Dept: HEMATOLOGY/ONCOLOGY | Facility: CLINIC | Age: 45
End: 2020-09-10

## 2020-09-29 DIAGNOSIS — B19.20 DECOMPENSATED HCV CIRRHOSIS: ICD-10-CM

## 2020-09-29 DIAGNOSIS — K74.69 DECOMPENSATED HCV CIRRHOSIS: ICD-10-CM

## 2020-09-29 DIAGNOSIS — F10.10 ALCOHOL ABUSE: Chronic | ICD-10-CM

## 2020-09-29 DIAGNOSIS — N17.9 AKI (ACUTE KIDNEY INJURY): ICD-10-CM

## 2020-09-29 DIAGNOSIS — I85.11 SECONDARY ESOPHAGEAL VARICES WITH BLEEDING: ICD-10-CM

## 2020-09-29 NOTE — TELEPHONE ENCOUNTER
----- Message from An Man sent at 9/29/2020  2:03 PM CDT -----  Contact: self/[phone]  Would like to consult with nurse regarding a refill on her blood pressure medication . Please call back at [phone]. Thanks/ar

## 2020-09-29 NOTE — TELEPHONE ENCOUNTER
Returned patients call and informed her that her medication was sent to [facility] to sign off and send to pharmacy. Patient also asked about paper work. Informed her that I sent it to the office through the mail and emailed it on 08/28. She verbalized understanding.

## 2020-09-30 RX ORDER — PROPRANOLOL HYDROCHLORIDE 20 MG/1
20 TABLET ORAL 2 TIMES DAILY
Qty: 60 TABLET | Refills: 11 | Status: SHIPPED | OUTPATIENT
Start: 2020-09-30 | End: 2021-09-30

## 2020-11-25 ENCOUNTER — HOSPITAL ENCOUNTER (INPATIENT)
Facility: HOSPITAL | Age: 45
LOS: 4 days | Discharge: HOME | DRG: 432 | End: 2020-11-29
Attending: EMERGENCY MEDICINE | Admitting: EMERGENCY MEDICINE
Payer: MEDICAID

## 2020-11-25 DIAGNOSIS — I85.11 SECONDARY ESOPHAGEAL VARICES WITH BLEEDING: ICD-10-CM

## 2020-11-25 DIAGNOSIS — K70.31 ALCOHOLIC CIRRHOSIS OF LIVER WITH ASCITES: ICD-10-CM

## 2020-11-25 DIAGNOSIS — D64.9 ANEMIA, UNSPECIFIED TYPE: ICD-10-CM

## 2020-11-25 DIAGNOSIS — B19.20 DECOMPENSATED HCV CIRRHOSIS: ICD-10-CM

## 2020-11-25 DIAGNOSIS — R06.00 DYSPNEA: ICD-10-CM

## 2020-11-25 DIAGNOSIS — I50.9 ACUTE ON CHRONIC CONGESTIVE HEART FAILURE, UNSPECIFIED HEART FAILURE TYPE: ICD-10-CM

## 2020-11-25 DIAGNOSIS — D64.9 ANEMIA: ICD-10-CM

## 2020-11-25 DIAGNOSIS — K74.69 DECOMPENSATED HCV CIRRHOSIS: ICD-10-CM

## 2020-11-25 DIAGNOSIS — J81.0 ACUTE PULMONARY EDEMA: Primary | ICD-10-CM

## 2020-11-25 DIAGNOSIS — D59.4 OTHER NON-AUTOIMMUNE HEMOLYTIC ANEMIAS: ICD-10-CM

## 2020-11-25 PROBLEM — R17 ELEVATED BILIRUBIN: Status: ACTIVE | Noted: 2020-11-25

## 2020-11-25 LAB
ABO + RH BLD: NORMAL
ALBUMIN SERPL BCP-MCNC: 2.8 G/DL (ref 3.5–5.2)
ALP SERPL-CCNC: 192 U/L (ref 55–135)
ALT SERPL W/O P-5'-P-CCNC: 32 U/L (ref 10–44)
ANION GAP SERPL CALC-SCNC: 19 MMOL/L (ref 8–16)
AST SERPL-CCNC: 140 U/L (ref 10–40)
BASOPHILS # BLD AUTO: 0.02 K/UL (ref 0–0.2)
BASOPHILS NFR BLD: 0.3 % (ref 0–1.9)
BILIRUB SERPL-MCNC: 15.8 MG/DL (ref 0.1–1)
BILIRUB UR QL STRIP: NEGATIVE
BLD GP AB SCN CELLS X3 SERPL QL: NORMAL
BLD PROD TYP BPU: NORMAL
BLOOD UNIT EXPIRATION DATE: NORMAL
BLOOD UNIT TYPE CODE: 7300
BLOOD UNIT TYPE: NORMAL
BNP SERPL-MCNC: 3105 PG/ML (ref 0–99)
BUN SERPL-MCNC: 22 MG/DL (ref 6–20)
CALCIUM SERPL-MCNC: 9.1 MG/DL (ref 8.7–10.5)
CHLORIDE SERPL-SCNC: 98 MMOL/L (ref 95–110)
CLARITY UR: CLEAR
CO2 SERPL-SCNC: 15 MMOL/L (ref 23–29)
CODING SYSTEM: NORMAL
COLOR UR: YELLOW
CREAT SERPL-MCNC: 1.1 MG/DL (ref 0.5–1.4)
DIFFERENTIAL METHOD: ABNORMAL
DISPENSE STATUS: NORMAL
EOSINOPHIL # BLD AUTO: 0 K/UL (ref 0–0.5)
EOSINOPHIL NFR BLD: 0.2 % (ref 0–8)
ERYTHROCYTE [DISTWIDTH] IN BLOOD BY AUTOMATED COUNT: 16.7 % (ref 11.5–14.5)
EST. GFR  (AFRICAN AMERICAN): >60 ML/MIN/1.73 M^2
EST. GFR  (NON AFRICAN AMERICAN): >60 ML/MIN/1.73 M^2
GLUCOSE SERPL-MCNC: 116 MG/DL (ref 70–110)
GLUCOSE UR QL STRIP: NEGATIVE
HCT VFR BLD AUTO: 18.5 % (ref 37–48.5)
HGB BLD-MCNC: 6.1 G/DL (ref 12–16)
HGB UR QL STRIP: ABNORMAL
IMM GRANULOCYTES # BLD AUTO: 0.03 K/UL (ref 0–0.04)
IMM GRANULOCYTES NFR BLD AUTO: 0.5 % (ref 0–0.5)
INR PPP: 1.9 (ref 0.8–1.2)
KETONES UR QL STRIP: ABNORMAL
LDH SERPL L TO P-CCNC: 317 U/L (ref 110–260)
LEUKOCYTE ESTERASE UR QL STRIP: NEGATIVE
LYMPHOCYTES # BLD AUTO: 0.5 K/UL (ref 1–4.8)
LYMPHOCYTES NFR BLD: 8.3 % (ref 18–48)
MAGNESIUM SERPL-MCNC: 0.9 MG/DL (ref 1.6–2.6)
MCH RBC QN AUTO: 32.6 PG (ref 27–31)
MCHC RBC AUTO-ENTMCNC: 33 G/DL (ref 32–36)
MCV RBC AUTO: 99 FL (ref 82–98)
MONOCYTES # BLD AUTO: 0.5 K/UL (ref 0.3–1)
MONOCYTES NFR BLD: 7.8 % (ref 4–15)
NEUTROPHILS # BLD AUTO: 5 K/UL (ref 1.8–7.7)
NEUTROPHILS NFR BLD: 82.9 % (ref 38–73)
NITRITE UR QL STRIP: NEGATIVE
NRBC BLD-RTO: 0 /100 WBC
NUM UNITS TRANS PACKED RBC: NORMAL
PH UR STRIP: 7 [PH] (ref 5–8)
PHOSPHATE SERPL-MCNC: 3.4 MG/DL (ref 2.7–4.5)
PLATELET # BLD AUTO: 63 K/UL (ref 150–350)
PMV BLD AUTO: 11 FL (ref 9.2–12.9)
POTASSIUM SERPL-SCNC: 5.1 MMOL/L (ref 3.5–5.1)
PROT SERPL-MCNC: 10.6 G/DL (ref 6–8.4)
PROT UR QL STRIP: NEGATIVE
PROTHROMBIN TIME: 19.2 SEC (ref 9–12.5)
RBC # BLD AUTO: 1.87 M/UL (ref 4–5.4)
SARS-COV-2 RDRP RESP QL NAA+PROBE: NEGATIVE
SODIUM SERPL-SCNC: 132 MMOL/L (ref 136–145)
SP GR UR STRIP: 1.01 (ref 1–1.03)
TROPONIN I SERPL DL<=0.01 NG/ML-MCNC: 0.07 NG/ML (ref 0–0.03)
URN SPEC COLLECT METH UR: ABNORMAL
UROBILINOGEN UR STRIP-ACNC: 1 EU/DL
WBC # BLD AUTO: 6 K/UL (ref 3.9–12.7)

## 2020-11-25 PROCEDURE — 93005 ELECTROCARDIOGRAM TRACING: CPT

## 2020-11-25 PROCEDURE — 63600175 PHARM REV CODE 636 W HCPCS: Performed by: NURSE PRACTITIONER

## 2020-11-25 PROCEDURE — 99233 SBSQ HOSP IP/OBS HIGH 50: CPT | Mod: ,,, | Performed by: INTERNAL MEDICINE

## 2020-11-25 PROCEDURE — 84100 ASSAY OF PHOSPHORUS: CPT

## 2020-11-25 PROCEDURE — 93010 EKG 12-LEAD: ICD-10-PCS | Mod: ,,, | Performed by: INTERNAL MEDICINE

## 2020-11-25 PROCEDURE — P9016 RBC LEUKOCYTES REDUCED: HCPCS

## 2020-11-25 PROCEDURE — U0002 COVID-19 LAB TEST NON-CDC: HCPCS

## 2020-11-25 PROCEDURE — 84484 ASSAY OF TROPONIN QUANT: CPT

## 2020-11-25 PROCEDURE — 25000003 PHARM REV CODE 250: Performed by: NURSE PRACTITIONER

## 2020-11-25 PROCEDURE — 85610 PROTHROMBIN TIME: CPT

## 2020-11-25 PROCEDURE — 63600175 PHARM REV CODE 636 W HCPCS: Performed by: EMERGENCY MEDICINE

## 2020-11-25 PROCEDURE — 21400001 HC TELEMETRY ROOM

## 2020-11-25 PROCEDURE — 86920 COMPATIBILITY TEST SPIN: CPT

## 2020-11-25 PROCEDURE — 86901 BLOOD TYPING SEROLOGIC RH(D): CPT

## 2020-11-25 PROCEDURE — 83880 ASSAY OF NATRIURETIC PEPTIDE: CPT

## 2020-11-25 PROCEDURE — 99233 PR SUBSEQUENT HOSPITAL CARE,LEVL III: ICD-10-PCS | Mod: ,,, | Performed by: INTERNAL MEDICINE

## 2020-11-25 PROCEDURE — 83735 ASSAY OF MAGNESIUM: CPT

## 2020-11-25 PROCEDURE — 96374 THER/PROPH/DIAG INJ IV PUSH: CPT

## 2020-11-25 PROCEDURE — 85045 AUTOMATED RETICULOCYTE COUNT: CPT

## 2020-11-25 PROCEDURE — 83615 LACTATE (LD) (LDH) ENZYME: CPT

## 2020-11-25 PROCEDURE — 93010 ELECTROCARDIOGRAM REPORT: CPT | Mod: ,,, | Performed by: INTERNAL MEDICINE

## 2020-11-25 PROCEDURE — 85025 COMPLETE CBC W/AUTO DIFF WBC: CPT

## 2020-11-25 PROCEDURE — 86850 RBC ANTIBODY SCREEN: CPT | Mod: 91

## 2020-11-25 PROCEDURE — 99291 CRITICAL CARE FIRST HOUR: CPT | Mod: 25

## 2020-11-25 PROCEDURE — 80053 COMPREHEN METABOLIC PANEL: CPT

## 2020-11-25 PROCEDURE — 83010 ASSAY OF HAPTOGLOBIN QUANT: CPT

## 2020-11-25 PROCEDURE — 99900035 HC TECH TIME PER 15 MIN (STAT)

## 2020-11-25 PROCEDURE — 81003 URINALYSIS AUTO W/O SCOPE: CPT

## 2020-11-25 PROCEDURE — 36415 COLL VENOUS BLD VENIPUNCTURE: CPT

## 2020-11-25 RX ORDER — FUROSEMIDE 10 MG/ML
40 INJECTION INTRAMUSCULAR; INTRAVENOUS
Status: DISCONTINUED | OUTPATIENT
Start: 2020-11-25 | End: 2020-11-25

## 2020-11-25 RX ORDER — SERTRALINE HYDROCHLORIDE 25 MG/1
25 TABLET, FILM COATED ORAL DAILY
Status: DISCONTINUED | OUTPATIENT
Start: 2020-11-26 | End: 2020-11-29 | Stop reason: HOSPADM

## 2020-11-25 RX ORDER — THIAMINE HCL 100 MG
100 TABLET ORAL DAILY
Status: DISCONTINUED | OUTPATIENT
Start: 2020-11-26 | End: 2020-11-29 | Stop reason: HOSPADM

## 2020-11-25 RX ORDER — SODIUM CHLORIDE 0.9 % (FLUSH) 0.9 %
10 SYRINGE (ML) INJECTION
Status: DISCONTINUED | OUTPATIENT
Start: 2020-11-25 | End: 2020-11-29 | Stop reason: HOSPADM

## 2020-11-25 RX ORDER — HYDROCODONE BITARTRATE AND ACETAMINOPHEN 500; 5 MG/1; MG/1
TABLET ORAL
Status: DISCONTINUED | OUTPATIENT
Start: 2020-11-25 | End: 2020-11-26

## 2020-11-25 RX ORDER — FUROSEMIDE 10 MG/ML
80 INJECTION INTRAMUSCULAR; INTRAVENOUS
Status: COMPLETED | OUTPATIENT
Start: 2020-11-25 | End: 2020-11-25

## 2020-11-25 RX ORDER — ONDANSETRON 2 MG/ML
8 INJECTION INTRAMUSCULAR; INTRAVENOUS EVERY 8 HOURS PRN
Status: DISCONTINUED | OUTPATIENT
Start: 2020-11-25 | End: 2020-11-29 | Stop reason: HOSPADM

## 2020-11-25 RX ORDER — FUROSEMIDE 10 MG/ML
40 INJECTION INTRAMUSCULAR; INTRAVENOUS
Status: DISCONTINUED | OUTPATIENT
Start: 2020-11-25 | End: 2020-11-27

## 2020-11-25 RX ORDER — FOLIC ACID 1 MG/1
1000 TABLET ORAL DAILY
Status: DISCONTINUED | OUTPATIENT
Start: 2020-11-26 | End: 2020-11-29 | Stop reason: HOSPADM

## 2020-11-25 RX ORDER — PROPRANOLOL HYDROCHLORIDE 20 MG/1
20 TABLET ORAL 2 TIMES DAILY
Status: DISCONTINUED | OUTPATIENT
Start: 2020-11-25 | End: 2020-11-29 | Stop reason: HOSPADM

## 2020-11-25 RX ORDER — HYDRALAZINE HYDROCHLORIDE 20 MG/ML
10 INJECTION INTRAMUSCULAR; INTRAVENOUS
Status: DISPENSED | OUTPATIENT
Start: 2020-11-25 | End: 2020-11-26

## 2020-11-25 RX ORDER — MAGNESIUM SULFATE HEPTAHYDRATE 40 MG/ML
2 INJECTION, SOLUTION INTRAVENOUS ONCE
Status: COMPLETED | OUTPATIENT
Start: 2020-11-25 | End: 2020-11-25

## 2020-11-25 RX ADMIN — FUROSEMIDE 40 MG: 10 INJECTION, SOLUTION INTRAMUSCULAR; INTRAVENOUS at 08:11

## 2020-11-25 RX ADMIN — FUROSEMIDE 80 MG: 10 INJECTION, SOLUTION INTRAMUSCULAR; INTRAVENOUS at 03:11

## 2020-11-25 RX ADMIN — PROPRANOLOL HYDROCHLORIDE 20 MG: 20 TABLET ORAL at 05:11

## 2020-11-25 RX ADMIN — MAGNESIUM SULFATE 2 G: 2 INJECTION INTRAVENOUS at 08:11

## 2020-11-25 NOTE — ASSESSMENT & PLAN NOTE
Patient has had decompensated alcohol cirrhosis with elevated meld score.  Her bilirubin is elevated but only slightly compared to baseline.  This elevation could be explained by her heart failure.  She has not been drinking alcohol.  We have been trying to get further evaluation of her liver and alisia hepatis based on abnormal ultrasound from July.  If kidney function tolerates would proceed with CT scan with and without IV contrast of the abdomen.  -monitor CMP, CBC and INR daily  -monitor for evidence of encephalopathy  -CT scan of the abdomen and pelvis with and without contrast liver protocol

## 2020-11-25 NOTE — CONSULTS
[facility]   Gastroenterology  Consult Note    Patient Name: John Wray  MRN: [id number]  Admission Date: 2020  Hospital Length of Stay: 0 days  Code Status: Full Code   Attending Provider: Klaus Chowdary MD   Consulting Provider: Edwige Spicer MD  Primary Care Physician: Primary Doctor No  Principal Problem:<principal problem not specified>    Inpatient consult to Gastroenterology  Consult performed by: Edwige Spicer MD  Consult ordered by: Francisco Freeman MD  Reason for consult: Cirrhosis  Assessment/Recommendations: Monitor labs, Ct abdomen        Subjective:     HPI:  44-year-old woman with alcohol cirrhosis decompensated in the past with esophageal varices.  She presented with shortness of breath.  She was found have an elevated BNP and thought to be in heart failure.  She also found have a hemoglobin of 6.  She denies any overt bleeding.  She did have obvious bleeding before when she was admitted for variceal bleed.  She reports she had been her usual state of health until yesterday when she developed shortness of breath and dyspnea on exertion.  She denies any alcohol use and months.  She does report having recent bleeding from her teeth that she reports has been quite significant.  She denies any other sources of bleeding.    Past Medical History:   Diagnosis Date    Alcohol abuse     Cirrhosis     Depression     Hypertension     Portal hypertension with esophageal varices        Past Surgical History:   Procedure Laterality Date     SECTION      ESOPHAGOGASTRODUODENOSCOPY      ESOPHAGOGASTRODUODENOSCOPY N/A 7/10/2020    Procedure: EGD (ESOPHAGOGASTRODUODENOSCOPY);  Surgeon: Edwige Spicer MD;  Location: [facility];  Service: Endoscopy;  Laterality: N/A;       Review of patient's allergies indicates:  No Known Allergies  Family History     None        Tobacco Use    Smoking status: Current Every Day Smoker     Packs/day: 0.50    Smokeless tobacco: Never Used    Substance and Sexual Activity    Alcohol use: Not Currently    Drug use: Never    Sexual activity: Not on file     Review of Systems   Constitutional: Negative.    HENT: Negative.    Eyes: Negative.    Respiratory: Positive for shortness of breath.    Cardiovascular: Positive for leg swelling.   Gastrointestinal: Negative.    Genitourinary: Negative.    Musculoskeletal: Negative.    Skin: Negative.    Neurological: Negative.    Psychiatric/Behavioral: Negative.      Objective:     Vital Signs (Most Recent):  Temp: 96.2 °F (35.7 °C) (11/25/20 1614)  Pulse: 83 (11/25/20 1614)  Resp: (!) 22 (11/25/20 1614)  BP: (!) 194/88 (11/25/20 1614)  SpO2: (!) 94 % (11/25/20 1614) Vital Signs (24h Range):  Temp:  [96.2 °F (35.7 °C)-97.4 °F (36.3 °C)] 96.2 °F (35.7 °C)  Pulse:  [70-83] 83  Resp:  [22-25] 22  SpO2:  [94 %-100 %] 94 %  BP: (187-200)/(60-88) 194/88     Weight: 80.8 kg (178 lb 3.9 oz) (11/25/20 1614)  Body mass index is 27.92 kg/m².    No intake or output data in the 24 hours ending 11/25/20 1659    Lines/Drains/Airways     Peripheral Intravenous Line                 Peripheral IV - Single Lumen 11/25/20 1509 18 G Right Forearm less than 1 day         Peripheral IV - Single Lumen 11/25/20 20 G Left Hand less than 1 day                Physical Exam  Vitals signs reviewed.   Constitutional:       General: She is not in acute distress.     Appearance: She is well-developed.   HENT:      Head: Normocephalic and atraumatic.      Mouth/Throat:      Pharynx: No oropharyngeal exudate.   Eyes:      General: Scleral icterus present.         Right eye: No discharge.         Left eye: No discharge.      Pupils: Pupils are equal, round, and reactive to light.   Pulmonary:      Effort: Pulmonary effort is normal. No respiratory distress.      Breath sounds: No wheezing.      Comments: Decreased as bases  Abdominal:      General: There is distension (nontense).      Palpations: Abdomen is soft.      Tenderness: There is no  abdominal tenderness.   Musculoskeletal:      Right lower leg: Edema present.      Left lower leg: Edema present.   Neurological:      Mental Status: She is alert and oriented to person, place, and time.   Psychiatric:         Behavior: Behavior normal.         Significant Labs:  CBC:   Recent Labs   Lab 11/25/20  1319   WBC 6.00   HGB 6.1*   HCT 18.5*   PLT 63*     CMP:   Recent Labs   Lab 11/25/20  1319   *   CALCIUM 9.1   ALBUMIN 2.8*   PROT 10.6*   *   K 5.1   CO2 15*   CL 98   BUN 22*   CREATININE 1.1   ALKPHOS 192*   ALT 32   *   BILITOT 15.8*     Coagulation: No results for input(s): PT, INR, APTT in the last 48 hours.    Significant Imaging:  Imaging results within the past 24 hours have been reviewed.     MELD-Na score: 21 at 8/10/2020 12:36 PM  MELD score: 21 at 8/10/2020 12:36 PM  Calculated from:  Serum Creatinine: 1.3 mg/dL at 8/10/2020 12:36 PM  Serum Sodium: 138 mmol/L (Rounded to 137 mmol/L) at 8/10/2020 12:36 PM  Total Bilirubin: 5.3 mg/dL at 8/10/2020 12:36 PM  INR(ratio): 1.6 at 8/10/2020 12:36 PM  Age: 44 years 7 months      Assessment/Plan:     Alcoholic cirrhosis of liver with ascites  Patient has had decompensated alcohol cirrhosis with elevated meld score.  Her bilirubin is elevated but only slightly compared to baseline.  This elevation could be explained by her heart failure.  She has not been drinking alcohol.  We have been trying to get further evaluation of her liver and alisia hepatis based on abnormal ultrasound from July.  If kidney function tolerates would proceed with CT scan with and without IV contrast of the abdomen.  -monitor CMP, CBC and INR daily  -monitor for evidence of encephalopathy  -CT scan of the abdomen and pelvis with and without contrast liver protocol    Anemia  Unclear etiology of bleeding.  There has been no obvious GI bleeding.  It is possible that the anemia could be worsened by the bleeding she has had from her mouth or consideration of a  hemolytic process.  She has had a low haptoglobin before but uncertain that is related to her liver disease.  Often patients with alcohol related liver disease due have unclear anemia is related to bone marrow issues.  -will try to add on reticulocyte count, LDH and haptoglobin to labs from today before patient is transfused  -low concern for GI bleed, continue with beta-blocker  -agree with transfusion, goal hemoglobin 8    Acute exacerbation of CHF (congestive heart failure)  This seems to be patient's primary problem this admission.  She does have cirrhosis that is decompensated that seems to be relatively stable.  It certainly can be worsened by CHF exacerbation.  -management per primary team but would consider repeat echocardiogram especially when patient is euvolemic.  -patient had evidence of pulmonary hypertension before.  It may be secondary to cardiac reasons but once patient's thought to be euvolemic if she still has elevated PA pressures recommend right heart catheterization        Thank you for your consult. I will follow-up with patient. Please contact us if you have any additional questions.    Edwige Spicer MD  Gastroenterology  [facility]

## 2020-11-25 NOTE — ASSESSMENT & PLAN NOTE
Patient has a history of cirrhosis   - Total Bilirubin 15.8  - GI consulted  - Will follow GI recs

## 2020-11-25 NOTE — HPI
44-year-old woman with alcohol cirrhosis decompensated in the past with esophageal varices.  She presented with shortness of breath.  She was found have an elevated BNP and thought to be in heart failure.  She also found have a hemoglobin of 6.  She denies any overt bleeding.  She did have obvious bleeding before when she was admitted for variceal bleed.  She reports she had been her usual state of health until yesterday when she developed shortness of breath and dyspnea on exertion.  She denies any alcohol use and months.  She does report having recent bleeding from her teeth that she reports has been quite significant.  She denies any other sources of bleeding.   Patient made aware of CT results and appt information.  He denies any increased shortness of breath, hemoptysis or lightheadedness. Denies chest pain, difficulty breathing or swallowing, or swelling in upper extremities.  He was counseled to go directly to ER if shortness of breath worsens or any of the other above symptoms occur.  Patient verbalized understanding.

## 2020-11-25 NOTE — SUBJECTIVE & OBJECTIVE
Past Medical History:   Diagnosis Date    Alcohol abuse     Cirrhosis     Depression     Hypertension     Portal hypertension with esophageal varices        Past Surgical History:   Procedure Laterality Date     SECTION      ESOPHAGOGASTRODUODENOSCOPY      ESOPHAGOGASTRODUODENOSCOPY N/A 7/10/2020    Procedure: EGD (ESOPHAGOGASTRODUODENOSCOPY);  Surgeon: Edwige Spicer MD;  Location: [facility];  Service: Endoscopy;  Laterality: N/A;       Review of patient's allergies indicates:  No Known Allergies    No current facility-administered medications on file prior to encounter.      Current Outpatient Medications on File Prior to Encounter   Medication Sig    folic acid (FOLVITE) 1 MG tablet TAKE 1 TABLET BY MOUTH ONCE DAILY    furosemide (LASIX) 20 MG tablet Take 1 tablet (20 mg total) by mouth 2 (two) times daily.    propranoloL (INDERAL) 20 MG tablet Take 1 tablet (20 mg total) by mouth 2 (two) times daily.    spironolactone (ALDACTONE) 50 MG tablet Take 1 tablet (50 mg total) by mouth once daily.    multivit-iron-FA-calcium-mins 9 mg iron-400 mcg Tab tablet Take 1 tablet by mouth once daily. (Patient not taking: Reported on 2020)    pantoprazole (PROTONIX) 40 MG tablet Take 40 mg by mouth once daily.    sertraline (ZOLOFT) 25 MG tablet Take 25 mg by mouth once daily.    thiamine 100 MG tablet Take 1 tablet (100 mg total) by mouth once daily.     Family History     None        Tobacco Use    Smoking status: Current Every Day Smoker     Packs/day: 0.50    Smokeless tobacco: Never Used   Substance and Sexual Activity    Alcohol use: Not Currently    Drug use: Never    Sexual activity: Not on file     Review of Systems   Constitutional: Positive for activity change. Negative for appetite change, chills and unexpected weight change.   HENT: Negative for congestion, mouth sores, sinus pressure, sore throat and trouble swallowing.    Eyes: Negative for photophobia, redness and visual  disturbance.   Respiratory: Positive for shortness of breath. Negative for cough and wheezing.    Cardiovascular: Negative for chest pain, palpitations and leg swelling.   Gastrointestinal: Negative for abdominal distention, abdominal pain, blood in stool, constipation, diarrhea, nausea and vomiting.   Endocrine: Negative for polydipsia, polyphagia and polyuria.   Genitourinary: Negative for flank pain, frequency and hematuria.   Musculoskeletal: Negative for back pain and neck stiffness.   Skin: Negative for pallor and rash.   Allergic/Immunologic: Negative for immunocompromised state.   Neurological: Negative for dizziness, tremors, seizures, syncope, speech difficulty, weakness and headaches.   Hematological: Negative for adenopathy. Does not bruise/bleed easily.   Psychiatric/Behavioral: Negative for agitation, confusion and suicidal ideas.     Objective:     Vital Signs (Most Recent):  Temp: 97.4 °F (36.3 °C) (11/25/20 1342)  Pulse: 76 (11/25/20 1511)  Resp: (!) 25 (11/25/20 1511)  BP: (!) 187/82 (11/25/20 1511)  SpO2: 100 % (11/25/20 1511) Vital Signs (24h Range):  Temp:  [97.4 °F (36.3 °C)] 97.4 °F (36.3 °C)  Pulse:  [70-76] 76  Resp:  [22-25] 25  SpO2:  [97 %-100 %] 100 %  BP: (187-200)/(60-82) 187/82        There is no height or weight on file to calculate BMI.    Physical Exam  Constitutional:       General: She is not in acute distress.     Appearance: Normal appearance.   HENT:      Head: Normocephalic and atraumatic.      Nose: No congestion or rhinorrhea.      Mouth/Throat:      Pharynx: No posterior oropharyngeal erythema.   Eyes:      General: Scleral icterus present.      Extraocular Movements: Extraocular movements intact.      Pupils: Pupils are equal, round, and reactive to light.   Neck:      Musculoskeletal: Normal range of motion and neck supple. No neck rigidity.      Vascular: No carotid bruit.   Cardiovascular:      Rate and Rhythm: Normal rate and regular rhythm.   Pulmonary:      Effort:  Pulmonary effort is normal. No respiratory distress.      Breath sounds: No stridor. Examination of the right-lower field reveals decreased breath sounds. Examination of the left-lower field reveals decreased breath sounds. Decreased breath sounds present. No wheezing.   Abdominal:      General: There is no distension.      Palpations: Abdomen is soft.      Tenderness: There is no abdominal tenderness. There is no guarding.   Musculoskeletal: Normal range of motion.         General: No swelling or deformity.   Skin:     General: Skin is warm and dry.      Capillary Refill: Capillary refill takes less than 2 seconds.      Coloration: Skin is not pale.      Findings: No erythema, lesion or rash.   Neurological:      Mental Status: She is alert and oriented to person, place, and time.      Motor: No weakness.   Psychiatric:         Mood and Affect: Mood normal.         Behavior: Behavior normal.         Thought Content: Thought content normal.         Judgment: Judgment normal.           CRANIAL NERVES     CN III, IV, VI   Pupils are equal, round, and reactive to light.       Significant Labs: All pertinent labs within the past 24 hours have been reviewed.    Significant Imaging: I have reviewed all pertinent imaging results/findings within the past 24 hours.     Imaging Results          X-Ray Chest AP Portable (Final result)  Result time 11/25/20 13:50:02    Final result by Eric Quintero MD (11/25/20 13:50:02)                 Impression:      Findings most consistent with mild congestive heart failure/volume overload.  Worse since 07/12/2020.      Electronically signed by: Eric Quintero  Date:    11/25/2020  Time:    13:50             Narrative:    EXAMINATION:  XR CHEST AP PORTABLE    CLINICAL HISTORY:  dyspnea;    COMPARISON:  07/12/2020    FINDINGS:  Hazy bilateral lower lung infiltrates are present with prominent pulmonary vasculature and enlarged cardiac silhouette.  Findings suspicious for mild  congestive heart failure/volume overload.  Findings slightly worse since 07/12/2020.

## 2020-11-25 NOTE — ED NOTES
NP with admitting team at bedside to evaluate pt. Updated NP of current blood pressure. NP states to give lasix and hold off hydralazine for now and see how lasix affects pt blood pressure.

## 2020-11-25 NOTE — ASSESSMENT & PLAN NOTE
Unclear etiology of bleeding.  There has been no obvious GI bleeding.  It is possible that the anemia could be worsened by the bleeding she has had from her mouth or consideration of a hemolytic process.  She has had a low haptoglobin before but uncertain that is related to her liver disease.  Often patients with alcohol related liver disease due have unclear anemia is related to bone marrow issues.  -will try to add on reticulocyte count, LDH and haptoglobin to labs from today before patient is transfused  -low concern for GI bleed, continue with beta-blocker  -agree with transfusion, goal hemoglobin 8

## 2020-11-25 NOTE — HOSPITAL COURSE
11/25: Ms Wray is a 44 yo F with phmx of chirrosis, HTN, and CHF who presented to the ED endorsing SOB. Reports noncompliance with medications. Bilirubin, BNP, and AST elevated. GI consulted. CXR showed mild CHF and volume overload. Diuresed with Lasix 80mg IV in the ED. Admitted to [facility] for symptom management.     As of 11/26/20 haptoglobin and reticulocytosis are most consistent with hemolytic anemia. 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 Will check direct and indirect bilirubin as well as lipid panel. Triple phase CT is pending to further evaluate liver. Hemoglobin today 6.3 following one unit of blood (likely from hemolytic anemia). No obvious signs of GI blood loss. GI is following. Will transfuse 2 units PRBCs today. Gastoenterology service is following. Worsening renal failure likely due to worsening anemia. Will repeat BMP in AM following 2 units. Thrombocytopenia noted. No need to transfuse. Will continue to diurese and monitor response.     As of 11/27/20, Hemoglobin is 8.3g/dL following 2 units. She has worsening kidney function today, likely from heart failure. Urine studies have been ordered. Continue diuresis for now. Will repeat CXR today.     11/28/2020, CXR shows improvement in heart failure. BNP trending down. Hemoglobin 7.7 today. Will transfuse one unit to keep Hgb>8. Anemia and thrombocytopenia discussed with Dr. Palumbo. 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 Direct and indirect bilirubin are pending. No other management for Zieve syndrome besides transfusion and alcohol cessation. No signs of bleeding. No abnormal  11/29/2020 kidney function continues to trend down. Hemoglobin is now stable. Okay to discharge per GI. She was counseled on ETOH cessation. She was asked to monitor fluid and sodium intake. She will follow up with [facility] in one week. She will continue Lasix daily.

## 2020-11-25 NOTE — SUBJECTIVE & OBJECTIVE
Past Medical History:   Diagnosis Date    Alcohol abuse     Cirrhosis     Depression     Hypertension     Portal hypertension with esophageal varices        Past Surgical History:   Procedure Laterality Date     SECTION      ESOPHAGOGASTRODUODENOSCOPY      ESOPHAGOGASTRODUODENOSCOPY N/A 7/10/2020    Procedure: EGD (ESOPHAGOGASTRODUODENOSCOPY);  Surgeon: Edwige Spicer MD;  Location: [facility];  Service: Endoscopy;  Laterality: N/A;       Review of patient's allergies indicates:  No Known Allergies  Family History     None        Tobacco Use    Smoking status: Current Every Day Smoker     Packs/day: 0.50    Smokeless tobacco: Never Used   Substance and Sexual Activity    Alcohol use: Not Currently    Drug use: Never    Sexual activity: Not on file     Review of Systems   Constitutional: Negative.    HENT: Negative.    Eyes: Negative.    Respiratory: Positive for shortness of breath.    Cardiovascular: Positive for leg swelling.   Gastrointestinal: Negative.    Genitourinary: Negative.    Musculoskeletal: Negative.    Skin: Negative.    Neurological: Negative.    Psychiatric/Behavioral: Negative.      Objective:     Vital Signs (Most Recent):  Temp: 96.2 °F (35.7 °C) (20)  Pulse: 83 (20)  Resp: (!) 22 (20)  BP: (!) 194/88 (20)  SpO2: (!) 94 % (20) Vital Signs (24h Range):  Temp:  [96.2 °F (35.7 °C)-97.4 °F (36.3 °C)] 96.2 °F (35.7 °C)  Pulse:  [70-83] 83  Resp:  [22-25] 22  SpO2:  [94 %-100 %] 94 %  BP: (187-200)/(60-88) 194/88     Weight: 80.8 kg (178 lb 3.9 oz) (20)  Body mass index is 27.92 kg/m².    No intake or output data in the 24 hours ending 20 1659    Lines/Drains/Airways     Peripheral Intravenous Line                 Peripheral IV - Single Lumen 20 1509 18 G Right Forearm less than 1 day         Peripheral IV - Single Lumen 20 20 G Left Hand less than 1 day                Physical Exam  Vitals signs  reviewed.   Constitutional:       General: She is not in acute distress.     Appearance: She is well-developed.   HENT:      Head: Normocephalic and atraumatic.      Mouth/Throat:      Pharynx: No oropharyngeal exudate.   Eyes:      General: Scleral icterus present.         Right eye: No discharge.         Left eye: No discharge.      Pupils: Pupils are equal, round, and reactive to light.   Pulmonary:      Effort: Pulmonary effort is normal. No respiratory distress.      Breath sounds: No wheezing.      Comments: Decreased as bases  Abdominal:      General: There is distension (nontense).      Palpations: Abdomen is soft.      Tenderness: There is no abdominal tenderness.   Musculoskeletal:      Right lower leg: Edema present.      Left lower leg: Edema present.   Neurological:      Mental Status: She is alert and oriented to person, place, and time.   Psychiatric:         Behavior: Behavior normal.         Significant Labs:  CBC:   Recent Labs   Lab 11/25/20  1319   WBC 6.00   HGB 6.1*   HCT 18.5*   PLT 63*     CMP:   Recent Labs   Lab 11/25/20  1319   *   CALCIUM 9.1   ALBUMIN 2.8*   PROT 10.6*   *   K 5.1   CO2 15*   CL 98   BUN 22*   CREATININE 1.1   ALKPHOS 192*   ALT 32   *   BILITOT 15.8*     Coagulation: No results for input(s): PT, INR, APTT in the last 48 hours.    Significant Imaging:  Imaging results within the past 24 hours have been reviewed.     MELD-Na score: 21 at 8/10/2020 12:36 PM  MELD score: 21 at 8/10/2020 12:36 PM  Calculated from:  Serum Creatinine: 1.3 mg/dL at 8/10/2020 12:36 PM  Serum Sodium: 138 mmol/L (Rounded to 137 mmol/L) at 8/10/2020 12:36 PM  Total Bilirubin: 5.3 mg/dL at 8/10/2020 12:36 PM  INR(ratio): 1.6 at 8/10/2020 12:36 PM  Age: 44 years 7 months

## 2020-11-25 NOTE — H&P
[facility] Hospital Medicine  History & Physical    Patient Name: John Wray  MRN: [id number]  Admission Date: 2020  Attending Physician: Francisco Freeman MD   Primary Care Provider: Primary Doctor No         Patient information was obtained from patient and ER records.     Subjective:     Principal Problem:<principal problem not specified>    Chief Complaint:   Chief Complaint   Patient presents with    Shortness of Breath     hx of liver failure, sob while laying down started last night        HPI: Ms Wray is a 44 y.o. female patient with a PMHx of cirrhosis, HTN who presents to the ED endorsing for SOB which started last night. She states the SOB worsens when she lays flat but at her baseline is able to do so. Patient denies any fever, chills, n/v/d, blood in stool, CP, weakness, numbness, dizziness, headache, and all other sxs at this time. Pt reports non compliance and states that she did not take her BP medication this morning, and has not taken her Lasix in several days.   ED workup:  BNP 3105, BUN 22, Cr 1.1, Bilirubin 15.8, Alkaline Phosphatase 192, , UA, H/H: 6.1/18.5. CXR showed: mild congestive heart failure/volume overload. Worse since 2020. x1 unit of PRBC transfused for anemia. GI consulted due to concern of cirrhosis. Occult stool pending. She received IV hydralazine and Lasix 80mg IV x1 dose.  contacted for symptom management. Patient will admitted under  and meets inpatient criteria with PT/INR >1.2 since hers is 1.9.   Patient is a Full Code and her SDM is her daughter Kalee Brooke: [phone]    Past Medical History:   Diagnosis Date    Alcohol abuse     Cirrhosis     Depression     Hypertension     Portal hypertension with esophageal varices        Past Surgical History:   Procedure Laterality Date     SECTION      ESOPHAGOGASTRODUODENOSCOPY      ESOPHAGOGASTRODUODENOSCOPY N/A 7/10/2020    Procedure: EGD  (ESOPHAGOGASTRODUODENOSCOPY);  Surgeon: Edwige Spicer MD;  Location: [facility];  Service: Endoscopy;  Laterality: N/A;       Review of patient's allergies indicates:  No Known Allergies    No current facility-administered medications on file prior to encounter.      Current Outpatient Medications on File Prior to Encounter   Medication Sig    folic acid (FOLVITE) 1 MG tablet TAKE 1 TABLET BY MOUTH ONCE DAILY    furosemide (LASIX) 20 MG tablet Take 1 tablet (20 mg total) by mouth 2 (two) times daily.    propranoloL (INDERAL) 20 MG tablet Take 1 tablet (20 mg total) by mouth 2 (two) times daily.    spironolactone (ALDACTONE) 50 MG tablet Take 1 tablet (50 mg total) by mouth once daily.    multivit-iron-FA-calcium-mins 9 mg iron-400 mcg Tab tablet Take 1 tablet by mouth once daily. (Patient not taking: Reported on 8/4/2020)    pantoprazole (PROTONIX) 40 MG tablet Take 40 mg by mouth once daily.    sertraline (ZOLOFT) 25 MG tablet Take 25 mg by mouth once daily.    thiamine 100 MG tablet Take 1 tablet (100 mg total) by mouth once daily.     Family History     None        Tobacco Use    Smoking status: Current Every Day Smoker     Packs/day: 0.50    Smokeless tobacco: Never Used   Substance and Sexual Activity    Alcohol use: Not Currently    Drug use: Never    Sexual activity: Not on file     Review of Systems   Constitutional: Positive for activity change. Negative for appetite change, chills and unexpected weight change.   HENT: Negative for congestion, mouth sores, sinus pressure, sore throat and trouble swallowing.    Eyes: Negative for photophobia, redness and visual disturbance.   Respiratory: Positive for shortness of breath. Negative for cough and wheezing.    Cardiovascular: Negative for chest pain, palpitations and leg swelling.   Gastrointestinal: Negative for abdominal distention, abdominal pain, blood in stool, constipation, diarrhea, nausea and vomiting.   Endocrine: Negative for polydipsia,  polyphagia and polyuria.   Genitourinary: Negative for flank pain, frequency and hematuria.   Musculoskeletal: Negative for back pain and neck stiffness.   Skin: Negative for pallor and rash.   Allergic/Immunologic: Negative for immunocompromised state.   Neurological: Negative for dizziness, tremors, seizures, syncope, speech difficulty, weakness and headaches.   Hematological: Negative for adenopathy. Does not bruise/bleed easily.   Psychiatric/Behavioral: Negative for agitation, confusion and suicidal ideas.     Objective:     Vital Signs (Most Recent):  Temp: 97.4 °F (36.3 °C) (11/25/20 1342)  Pulse: 76 (11/25/20 1511)  Resp: (!) 25 (11/25/20 1511)  BP: (!) 187/82 (11/25/20 1511)  SpO2: 100 % (11/25/20 1511) Vital Signs (24h Range):  Temp:  [97.4 °F (36.3 °C)] 97.4 °F (36.3 °C)  Pulse:  [70-76] 76  Resp:  [22-25] 25  SpO2:  [97 %-100 %] 100 %  BP: (187-200)/(60-82) 187/82        There is no height or weight on file to calculate BMI.    Physical Exam  Constitutional:       General: She is not in acute distress.     Appearance: Normal appearance.   HENT:      Head: Normocephalic and atraumatic.      Nose: No congestion or rhinorrhea.      Mouth/Throat:      Pharynx: No posterior oropharyngeal erythema.   Eyes:      General: Scleral icterus present.      Extraocular Movements: Extraocular movements intact.      Pupils: Pupils are equal, round, and reactive to light.   Neck:      Musculoskeletal: Normal range of motion and neck supple. No neck rigidity.      Vascular: No carotid bruit.   Cardiovascular:      Rate and Rhythm: Normal rate and regular rhythm.   Pulmonary:      Effort: Pulmonary effort is normal. No respiratory distress.      Breath sounds: No stridor. Examination of the right-lower field reveals decreased breath sounds. Examination of the left-lower field reveals decreased breath sounds. Decreased breath sounds present. No wheezing.   Abdominal:      General: There is no distension.      Palpations:  Abdomen is soft.      Tenderness: There is no abdominal tenderness. There is no guarding.   Musculoskeletal: Normal range of motion.         General: No swelling or deformity.   Skin:     General: Skin is warm and dry.      Capillary Refill: Capillary refill takes less than 2 seconds.      Coloration: Skin is not pale.      Findings: No erythema, lesion or rash.   Neurological:      Mental Status: She is alert and oriented to person, place, and time.      Motor: No weakness.   Psychiatric:         Mood and Affect: Mood normal.         Behavior: Behavior normal.         Thought Content: Thought content normal.         Judgment: Judgment normal.           CRANIAL NERVES     CN III, IV, VI   Pupils are equal, round, and reactive to light.       Significant Labs: All pertinent labs within the past 24 hours have been reviewed.    Significant Imaging: I have reviewed all pertinent imaging results/findings within the past 24 hours.     Imaging Results          X-Ray Chest AP Portable (Final result)  Result time 11/25/20 13:50:02    Final result by Eric Quintero MD (11/25/20 13:50:02)                 Impression:      Findings most consistent with mild congestive heart failure/volume overload.  Worse since 07/12/2020.      Electronically signed by: Eric Quintero  Date:    11/25/2020  Time:    13:50             Narrative:    EXAMINATION:  XR CHEST AP PORTABLE    CLINICAL HISTORY:  dyspnea;    COMPARISON:  07/12/2020    FINDINGS:  Hazy bilateral lower lung infiltrates are present with prominent pulmonary vasculature and enlarged cardiac silhouette.  Findings suspicious for mild congestive heart failure/volume overload.  Findings slightly worse since 07/12/2020.                              Assessment/Plan:     Anemia  Initial h/h 6.1/18.5  x1 unit of PBRC transfused in the ED  Will continue to monitor.       Elevated bilirubin  Patient has a history of cirrhosis   - Total Bilirubin 15.8  - GI consulted  - Will  follow GI recs      Acute exacerbation of CHF (congestive heart failure)  -BNP 3105  -Diuresed in ED with IV lasix  -Continue IV lasix  -Daily weights  -Strict I&Os    Acute pulmonary edema  -Admit to OBS  -CXR showed mild CHF/volume overload  -Echo performed 7/9/2020 showed an EF of 55%  -BNP elevated  -Diuresed in ED with x1 dose of IV Lasix  -Continue IV Lasix  -Daily weights  -Strict I&O's        VTE Risk Mitigation (From admission, onward)    None             Kaitlin Garcia NP  Department of Hospital Medicine   [facility]

## 2020-11-25 NOTE — ASSESSMENT & PLAN NOTE
-Admit to OBS  -CXR showed mild CHF/volume overload  -Echo performed 7/9/2020 showed an EF of 55%  -BNP elevated  -Diuresed in ED with x1 dose of IV Lasix  -Continue IV Lasix  -Daily weights  -Strict I&O's

## 2020-11-25 NOTE — ASSESSMENT & PLAN NOTE
This seems to be patient's primary problem this admission.  She does have cirrhosis that is decompensated that seems to be relatively stable.  It certainly can be worsened by CHF exacerbation.  -management per primary team but would consider repeat echocardiogram especially when patient is euvolemic.  -patient had evidence of pulmonary hypertension before.  It may be secondary to cardiac reasons but once patient's thought to be euvolemic if she still has elevated PA pressures recommend right heart catheterization

## 2020-11-25 NOTE — ED PROVIDER NOTES
SCRIBE #1 NOTE: I, Beto Jenkins, am scribing for, and in the presence of, Francisco Freeman MD. I have scribed the entire note.      History      Chief Complaint   Patient presents with    Shortness of Breath     hx of liver failure, sob while laying down started last night       Review of patient's allergies indicates:  No Known Allergies     HPI   HPI    2020, 12:47 PM   History obtained from the patient      History of Present Illness: John Wray is a 44 y.o. female patient with a PMHx of cirrhosis, HTN who presents to the Emergency Department for SOB, onset last night. Symptoms are constant and moderate in severity. Sxs are worse when laying flat. No associated sxs reported. Patient denies any fever, chills, n/v/d, blood in stool, CP, weakness, numbness, dizziness, headache, and all other sxs at this time. Pt states that she did not take her BP medication this morning, and has not taken her Lasix in several days. No further complaints or concerns at this time.     Arrival mode: EMS    PCP: Primary Doctor No       Past Medical History:  Past Medical History:   Diagnosis Date    Alcohol abuse     Cirrhosis     Depression     Hypertension     Portal hypertension with esophageal varices        Past Surgical History:  Past Surgical History:   Procedure Laterality Date     SECTION      ESOPHAGOGASTRODUODENOSCOPY      ESOPHAGOGASTRODUODENOSCOPY N/A 7/10/2020    Procedure: EGD (ESOPHAGOGASTRODUODENOSCOPY);  Surgeon: Edwige Spicer MD;  Location: [facility];  Service: Endoscopy;  Laterality: N/A;         Family History:  No family history on file.    Social History:  Social History     Tobacco Use    Smoking status: Current Every Day Smoker     Packs/day: 0.50    Smokeless tobacco: Never Used   Substance and Sexual Activity    Alcohol use: Not Currently    Drug use: Never    Sexual activity: Not on file       ROS   Review of Systems   Constitutional: Negative for chills and fever.   HENT:  Negative for sore throat.    Respiratory: Positive for shortness of breath.    Cardiovascular: Negative for chest pain.   Gastrointestinal: Negative for abdominal pain, blood in stool, diarrhea, nausea and vomiting.   Genitourinary: Negative for dysuria.   Musculoskeletal: Negative for back pain.   Skin: Negative for rash.   Neurological: Negative for weakness.   Hematological: Does not bruise/bleed easily.   All other systems reviewed and are negative.    Physical Exam      Initial Vitals   BP Pulse Resp Temp SpO2   11/25/20 1224 11/25/20 1224 11/25/20 1342 11/25/20 1342 11/25/20 1224   (!) 200/60 70 (!) 22 97.4 °F (36.3 °C) 97 %      MAP       --                 Physical Exam  Nursing Notes and Vital Signs Reviewed.  Constitutional: Patient is in no acute distress. Well-developed and well-nourished.  Head: Atraumatic. Normocephalic.  Eyes: PERRL. EOM intact. Conjunctivae are not pale. No scleral icterus.  ENT: Mucous membranes are moist. Oropharynx is clear and symmetric.    Neck: Supple. Full ROM. No lymphadenopathy.  Cardiovascular: Regular rate. Regular rhythm. No murmurs, rubs, or gallops. Distal pulses are 2+ and symmetric.  Pulmonary/Chest: Bilateral basal crackles  Abdominal: Soft and non-distended.  There is no tenderness.  No rebound, guarding, or rigidity. Abdominal incisional scar  Genitourinary: No CVA tenderness  Musculoskeletal: Moves all extremities. No obvious deformities. No edema.  Skin: Warm and dry.  Neurological:  Alert, awake, and appropriate.  Normal speech.  No acute focal neurological deficits are appreciated.  Psychiatric: Normal affect. Good eye contact. Appropriate in content.    ED Course    Critical Care    Date/Time: 11/25/2020 2:43 PM  Performed by: Francisco Freeman MD  Authorized by: Francisco Freeman MD   Direct patient critical care time: 10 minutes  Additional history critical care time: 5 minutes  Ordering / reviewing critical care time: 10 minutes  Documentation critical care  time: 10 minutes  Consulting other physicians critical care time: 5 minutes  Total critical care time (exclusive of procedural time) : 40 minutes  Critical care time was exclusive of separately billable procedures and treating other patients and teaching time.  Critical care was necessary to treat or prevent imminent or life-threatening deterioration of the following conditions: Acute pulmonary edema, anemia requiring blood transfusion.  Critical care was time spent personally by me on the following activities: blood draw for specimens, development of treatment plan with patient or surrogate, discussions with consultants, interpretation of cardiac output measurements, evaluation of patient's response to treatment, examination of patient, obtaining history from patient or surrogate, ordering and performing treatments and interventions, ordering and review of laboratory studies, ordering and review of radiographic studies, pulse oximetry, re-evaluation of patient's condition and review of old charts.        ED Vital Signs:  Vitals:    11/25/20 1224 11/25/20 1303 11/25/20 1342 11/25/20 1423   BP: (!) 200/60      Pulse: 70 76     Resp:   (!) 22    Temp:   97.4 °F (36.3 °C)    TempSrc:   Oral    SpO2: 97%   100%       Abnormal Lab Results:  Labs Reviewed   CBC W/ AUTO DIFFERENTIAL - Abnormal; Notable for the following components:       Result Value    RBC 1.87 (*)     Hemoglobin 6.1 (*)     Hematocrit 18.5 (*)     MCV 99 (*)     MCH 32.6 (*)     RDW 16.7 (*)     Platelets 63 (*)     Lymph # 0.5 (*)     Gran % 82.9 (*)     Lymph % 8.3 (*)     All other components within normal limits    Narrative:     HCT critical result(s) called and verbal readback obtained from   Coty Nelson by JBAlicia 11/25/2020 13:48   COMPREHENSIVE METABOLIC PANEL - Abnormal; Notable for the following components:    Sodium 132 (*)     CO2 15 (*)     Glucose 116 (*)     BUN 22 (*)     Total Protein 10.6 (*)     Albumin 2.8 (*)     Total Bilirubin  15.8 (*)     Alkaline Phosphatase 192 (*)      (*)     Anion Gap 19 (*)     All other components within normal limits   B-TYPE NATRIURETIC PEPTIDE - Abnormal; Notable for the following components:    BNP 3,105 (*)     All other components within normal limits   TROPONIN I - Abnormal; Notable for the following components:    Troponin I 0.068 (*)     All other components within normal limits   URINALYSIS, REFLEX TO URINE CULTURE - Abnormal; Notable for the following components:    Ketones, UA Trace (*)     Occult Blood UA Trace (*)     All other components within normal limits    Narrative:     Specimen Source->Urine   SARS-COV-2 RNA AMPLIFICATION, QUAL   OCCULT BLOOD X 1, STOOL   TYPE & SCREEN   PREPARE RBC SOFT        All Lab Results:  Results for orders placed or performed during the hospital encounter of 11/25/20   CBC auto differential   Result Value Ref Range    WBC 6.00 3.90 - 12.70 K/uL    RBC 1.87 (L) 4.00 - 5.40 M/uL    Hemoglobin 6.1 (L) 12.0 - 16.0 g/dL    Hematocrit 18.5 (LL) 37.0 - 48.5 %    MCV 99 (H) 82 - 98 fL    MCH 32.6 (H) 27.0 - 31.0 pg    MCHC 33.0 32.0 - 36.0 g/dL    RDW 16.7 (H) 11.5 - 14.5 %    Platelets 63 (L) 150 - 350 K/uL    MPV 11.0 9.2 - 12.9 fL    Immature Granulocytes 0.5 0.0 - 0.5 %    Gran # (ANC) 5.0 1.8 - 7.7 K/uL    Immature Grans (Abs) 0.03 0.00 - 0.04 K/uL    Lymph # 0.5 (L) 1.0 - 4.8 K/uL    Mono # 0.5 0.3 - 1.0 K/uL    Eos # 0.0 0.0 - 0.5 K/uL    Baso # 0.02 0.00 - 0.20 K/uL    nRBC 0 0 /100 WBC    Gran % 82.9 (H) 38.0 - 73.0 %    Lymph % 8.3 (L) 18.0 - 48.0 %    Mono % 7.8 4.0 - 15.0 %    Eosinophil % 0.2 0.0 - 8.0 %    Basophil % 0.3 0.0 - 1.9 %    Differential Method Automated    Comprehensive metabolic panel   Result Value Ref Range    Sodium 132 (L) 136 - 145 mmol/L    Potassium 5.1 3.5 - 5.1 mmol/L    Chloride 98 95 - 110 mmol/L    CO2 15 (L) 23 - 29 mmol/L    Glucose 116 (H) 70 - 110 mg/dL    BUN 22 (H) 6 - 20 mg/dL    Creatinine 1.1 0.5 - 1.4 mg/dL    Calcium  9.1 8.7 - 10.5 mg/dL    Total Protein 10.6 (H) 6.0 - 8.4 g/dL    Albumin 2.8 (L) 3.5 - 5.2 g/dL    Total Bilirubin 15.8 (H) 0.1 - 1.0 mg/dL    Alkaline Phosphatase 192 (H) 55 - 135 U/L     (H) 10 - 40 U/L    ALT 32 10 - 44 U/L    Anion Gap 19 (H) 8 - 16 mmol/L    eGFR if African American >60 >60 mL/min/1.73 m^2    eGFR if non African American >60 >60 mL/min/1.73 m^2   Brain natriuretic peptide   Result Value Ref Range    BNP 3,105 (H) 0 - 99 pg/mL   Troponin I   Result Value Ref Range    Troponin I 0.068 (H) 0.000 - 0.026 ng/mL   Urinalysis, Reflex to Urine Culture Urine, Clean Catch    Specimen: Urine   Result Value Ref Range    Specimen UA Urine, Catheterized     Color, UA Yellow Yellow, Straw, Margarita    Appearance, UA Clear Clear    pH, UA 7.0 5.0 - 8.0    Specific Gravity, UA 1.015 1.005 - 1.030    Protein, UA Negative Negative    Glucose, UA Negative Negative    Ketones, UA Trace (A) Negative    Bilirubin (UA) Negative Negative    Occult Blood UA Trace (A) Negative    Nitrite, UA Negative Negative    Urobilinogen, UA 1.0 <2.0 EU/dL    Leukocytes, UA Negative Negative   COVID-19 Rapid Screening   Result Value Ref Range    SARS-CoV-2 RNA, Amplification, Qual Negative Negative     Imaging Results:  Imaging Results          X-Ray Chest AP Portable (Final result)  Result time 11/25/20 13:50:02    Final result by Eric Quintero MD (11/25/20 13:50:02)                 Impression:      Findings most consistent with mild congestive heart failure/volume overload.  Worse since 07/12/2020.      Electronically signed by: Eric Quintero  Date:    11/25/2020  Time:    13:50             Narrative:    EXAMINATION:  XR CHEST AP PORTABLE    CLINICAL HISTORY:  dyspnea;    COMPARISON:  07/12/2020    FINDINGS:  Hazy bilateral lower lung infiltrates are present with prominent pulmonary vasculature and enlarged cardiac silhouette.  Findings suspicious for mild congestive heart failure/volume overload.  Findings  slightly worse since 07/12/2020.                               The EKG was ordered, reviewed, and independently interpreted by the ED provider.  Interpretation time: 13:29  Rate: 71 BPM  Rhythm: normal sinus rhythm  Interpretation: Possible left atrial enlargement. No STEMI.           The Emergency Provider reviewed the vital signs and test results, which are outlined above.    ED Discussion     2:50 PM: Discussed pt's case with Jessica Verma NP ([facility]) who recommends consulting Gastroenterology.    3:00 PM: Re-evaluated pt. I have discussed test results, shared treatment plan, and the need for admission with patient at bedside. Pt expresses understanding at this time and agrees with all information. All questions answered. Pt has no further questions or concerns at this time. Pt is ready for admit.    3:07 PM: Discussed case with Jessica Verma NP ([facility]). Dr. Chowdary agrees with current care and management of pt and accepts admission.   Admitting Service: Hospital Medicine  Admitting Physician: Dr. Chowdary  Admit to: Obs Tele         ED Medication(s):  Medications   furosemide injection 80 mg (has no administration in time range)   hydrALAZINE injection 10 mg (has no administration in time range)   0.9%  NaCl infusion (for blood administration) (has no administration in time range)          New Prescriptions    No medications on file         Medical Decision Making    Medical Decision Making:   Clinical Tests:   Lab Tests: Ordered and Reviewed  Radiological Study: Ordered and Reviewed  Medical Tests: Ordered and Reviewed           Scribe Attestation:   Scribe #1: I performed the above scribed service and the documentation accurately describes the services I performed. I attest to the accuracy of the note.    Attending:   Physician Attestation Statement for Scribe #1: I, Francisco Freeman MD, personally performed the services described in this documentation, as scribed by Beto Jenkins,  in my presence, and it is both accurate and complete.          Clinical Impression       ICD-10-CM ICD-9-CM   1. Acute pulmonary edema  J81.0 518.4   2. Dyspnea  R06.00 786.09   3. Acute on chronic congestive heart failure, unspecified heart failure type  I50.9 428.0   4. Anemia, unspecified type  D64.9 285.9       Disposition:   Disposition: Placed in Observation  Condition: Fair         Francisco Freeman MD  11/26/20 0745

## 2020-11-26 PROBLEM — S37.009A RENAL INJURY: Status: ACTIVE | Noted: 2020-11-26

## 2020-11-26 PROBLEM — R79.89 ELEVATED TROPONIN: Status: ACTIVE | Noted: 2020-11-26

## 2020-11-26 PROBLEM — D69.6 THROMBOCYTOPENIA: Status: ACTIVE | Noted: 2020-11-26

## 2020-11-26 PROBLEM — D58.9 HEMOLYTIC ANEMIA: Status: ACTIVE | Noted: 2020-11-25

## 2020-11-26 LAB
ALBUMIN SERPL BCP-MCNC: 2.5 G/DL (ref 3.5–5.2)
ALP SERPL-CCNC: 154 U/L (ref 55–135)
ALT SERPL W/O P-5'-P-CCNC: 29 U/L (ref 10–44)
ANION GAP SERPL CALC-SCNC: 15 MMOL/L (ref 8–16)
AST SERPL-CCNC: 111 U/L (ref 10–40)
BASOPHILS # BLD AUTO: 0.03 K/UL (ref 0–0.2)
BASOPHILS NFR BLD: 0.5 % (ref 0–1.9)
BILIRUB SERPL-MCNC: 18 MG/DL (ref 0.1–1)
BLD PROD TYP BPU: NORMAL
BLD PROD TYP BPU: NORMAL
BLOOD UNIT EXPIRATION DATE: NORMAL
BLOOD UNIT EXPIRATION DATE: NORMAL
BLOOD UNIT TYPE CODE: 7300
BLOOD UNIT TYPE CODE: 7300
BLOOD UNIT TYPE: NORMAL
BLOOD UNIT TYPE: NORMAL
BUN SERPL-MCNC: 33 MG/DL (ref 6–20)
CALCIUM SERPL-MCNC: 8.9 MG/DL (ref 8.7–10.5)
CHLORIDE SERPL-SCNC: 99 MMOL/L (ref 95–110)
CHOLEST SERPL-MCNC: 336 MG/DL (ref 120–199)
CHOLEST/HDLC SERPL: 10.8 {RATIO} (ref 2–5)
CO2 SERPL-SCNC: 19 MMOL/L (ref 23–29)
CODING SYSTEM: NORMAL
CODING SYSTEM: NORMAL
CREAT SERPL-MCNC: 1.7 MG/DL (ref 0.5–1.4)
DIFFERENTIAL METHOD: ABNORMAL
DISPENSE STATUS: NORMAL
DISPENSE STATUS: NORMAL
EOSINOPHIL # BLD AUTO: 0.1 K/UL (ref 0–0.5)
EOSINOPHIL NFR BLD: 1.2 % (ref 0–8)
ERYTHROCYTE [DISTWIDTH] IN BLOOD BY AUTOMATED COUNT: 17.5 % (ref 11.5–14.5)
EST. GFR  (AFRICAN AMERICAN): 42 ML/MIN/1.73 M^2
EST. GFR  (NON AFRICAN AMERICAN): 36 ML/MIN/1.73 M^2
GLUCOSE SERPL-MCNC: 127 MG/DL (ref 70–110)
HAPTOGLOB SERPL-MCNC: <10 MG/DL (ref 30–250)
HCT VFR BLD AUTO: 19.3 % (ref 37–48.5)
HDLC SERPL-MCNC: 31 MG/DL (ref 40–75)
HDLC SERPL: 9.2 % (ref 20–50)
HGB BLD-MCNC: 6.3 G/DL (ref 12–16)
IMM GRANULOCYTES # BLD AUTO: 0.05 K/UL (ref 0–0.04)
IMM GRANULOCYTES NFR BLD AUTO: 0.8 % (ref 0–0.5)
LDLC SERPL CALC-MCNC: 251.6 MG/DL (ref 63–159)
LYMPHOCYTES # BLD AUTO: 0.6 K/UL (ref 1–4.8)
LYMPHOCYTES NFR BLD: 9.7 % (ref 18–48)
MCH RBC QN AUTO: 31.7 PG (ref 27–31)
MCHC RBC AUTO-ENTMCNC: 32.6 G/DL (ref 32–36)
MCV RBC AUTO: 97 FL (ref 82–98)
MONOCYTES # BLD AUTO: 0.5 K/UL (ref 0.3–1)
MONOCYTES NFR BLD: 7.6 % (ref 4–15)
NEUTROPHILS # BLD AUTO: 5.2 K/UL (ref 1.8–7.7)
NEUTROPHILS NFR BLD: 80.2 % (ref 38–73)
NONHDLC SERPL-MCNC: 305 MG/DL
NRBC BLD-RTO: 0 /100 WBC
NUM UNITS TRANS PACKED RBC: NORMAL
NUM UNITS TRANS PACKED RBC: NORMAL
PLATELET # BLD AUTO: 56 K/UL (ref 150–350)
PMV BLD AUTO: 11.3 FL (ref 9.2–12.9)
POTASSIUM SERPL-SCNC: 3.7 MMOL/L (ref 3.5–5.1)
PROT SERPL-MCNC: 9.1 G/DL (ref 6–8.4)
RBC # BLD AUTO: 1.99 M/UL (ref 4–5.4)
RETICS/RBC NFR AUTO: 3 % (ref 0.5–2.5)
SODIUM SERPL-SCNC: 133 MMOL/L (ref 136–145)
TRIGL SERPL-MCNC: 267 MG/DL (ref 30–150)
TROPONIN I SERPL DL<=0.01 NG/ML-MCNC: 0.09 NG/ML (ref 0–0.03)
WBC # BLD AUTO: 6.42 K/UL (ref 3.9–12.7)

## 2020-11-26 PROCEDURE — 80061 LIPID PANEL: CPT

## 2020-11-26 PROCEDURE — 25000003 PHARM REV CODE 250: Performed by: NURSE PRACTITIONER

## 2020-11-26 PROCEDURE — 85025 COMPLETE CBC W/AUTO DIFF WBC: CPT

## 2020-11-26 PROCEDURE — 21400001 HC TELEMETRY ROOM

## 2020-11-26 PROCEDURE — 63600175 PHARM REV CODE 636 W HCPCS: Performed by: NURSE PRACTITIONER

## 2020-11-26 PROCEDURE — 99233 PR SUBSEQUENT HOSPITAL CARE,LEVL III: ICD-10-PCS | Mod: ,,, | Performed by: INTERNAL MEDICINE

## 2020-11-26 PROCEDURE — 94760 N-INVAS EAR/PLS OXIMETRY 1: CPT

## 2020-11-26 PROCEDURE — P9016 RBC LEUKOCYTES REDUCED: HCPCS

## 2020-11-26 PROCEDURE — 36430 TRANSFUSION BLD/BLD COMPNT: CPT

## 2020-11-26 PROCEDURE — 99233 SBSQ HOSP IP/OBS HIGH 50: CPT | Mod: ,,, | Performed by: INTERNAL MEDICINE

## 2020-11-26 PROCEDURE — 80053 COMPREHEN METABOLIC PANEL: CPT

## 2020-11-26 PROCEDURE — 36415 COLL VENOUS BLD VENIPUNCTURE: CPT

## 2020-11-26 PROCEDURE — 84484 ASSAY OF TROPONIN QUANT: CPT

## 2020-11-26 RX ORDER — HYDROCODONE BITARTRATE AND ACETAMINOPHEN 500; 5 MG/1; MG/1
TABLET ORAL
Status: DISCONTINUED | OUTPATIENT
Start: 2020-11-26 | End: 2020-11-28

## 2020-11-26 RX ORDER — TALC
6 POWDER (GRAM) TOPICAL NIGHTLY PRN
Status: DISCONTINUED | OUTPATIENT
Start: 2020-11-26 | End: 2020-11-29 | Stop reason: HOSPADM

## 2020-11-26 RX ADMIN — FOLIC ACID 1000 MCG: 1 TABLET ORAL at 09:11

## 2020-11-26 RX ADMIN — FUROSEMIDE 40 MG: 10 INJECTION, SOLUTION INTRAMUSCULAR; INTRAVENOUS at 08:11

## 2020-11-26 RX ADMIN — Medication 6 MG: at 09:11

## 2020-11-26 RX ADMIN — PROPRANOLOL HYDROCHLORIDE 20 MG: 20 TABLET ORAL at 08:11

## 2020-11-26 RX ADMIN — PROPRANOLOL HYDROCHLORIDE 20 MG: 20 TABLET ORAL at 09:11

## 2020-11-26 RX ADMIN — SERTRALINE HYDROCHLORIDE 25 MG: 25 TABLET ORAL at 09:11

## 2020-11-26 RX ADMIN — Medication 100 MG: at 09:11

## 2020-11-26 RX ADMIN — FUROSEMIDE 40 MG: 10 INJECTION, SOLUTION INTRAMUSCULAR; INTRAVENOUS at 09:11

## 2020-11-26 NOTE — ASSESSMENT & PLAN NOTE
-Admit to OBS  -CXR showed mild CHF/volume overload  -Echo performed 7/9/2020 showed an EF of 55% and diastolic dysfunction  -BNP elevated  -Diuresed in ED with x1 dose of IV Lasix  -Continue IV Lasix  -Daily weights  -Strict I&O's

## 2020-11-26 NOTE — SIGNIFICANT EVENT
Deterioration alert received, likely d/t documented RR of 26, BP of 169/74 and O2 saturation of 93% on 1.5LNC. Pt seen and examined at bedside, resting quietly in bed, no evidence of acute distress, appears stable at this time, denies pain/needs. No indication for higher level of care.

## 2020-11-26 NOTE — PROGRESS NOTES
"[facility]  Gastroenterology  Progress Note    Patient Name: John Wray  MRN: [id number]  Admission Date: 11/25/2020  Hospital Length of Stay: 1 days  Code Status: Full Code   Attending Provider: Klaus Chowdary MD  Consulting Provider: Rosanna Pablo MD  Primary Care Physician: Primary Doctor No  Principal Problem: <principal problem not specified>    Subjective:     Interval History: alert and sitting up in bed. Admits to "one beer" on 11/23/20, her daughter's birthday.     Review of Systems  Objective:     Vital Signs (Most Recent):  Temp: 99.2 °F (37.3 °C) (11/26/20 1037)  Pulse: 68 (11/26/20 1037)  Resp: 18 (11/26/20 1037)  BP: (!) 123/57 (11/26/20 1037)  SpO2: (!) 94 % (11/26/20 1037) Vital Signs (24h Range):  Temp:  [96.2 °F (35.7 °C)-99.2 °F (37.3 °C)] 99.2 °F (37.3 °C)  Pulse:  [68-86] 68  Resp:  [18-26] 18  SpO2:  [92 %-100 %] 94 %  BP: (123-200)/(57-88) 123/57     Weight: 80.2 kg (176 lb 12.9 oz) (11/26/20 0429)  Body mass index is 28.54 kg/m².      Intake/Output Summary (Last 24 hours) at 11/26/2020 1130  Last data filed at 11/26/2020 0500  Gross per 24 hour   Intake 876.67 ml   Output 1000 ml   Net -123.33 ml       Lines/Drains/Airways     Peripheral Intravenous Line                 Peripheral IV - Single Lumen 11/25/20 20 G Left Hand 1 day         Peripheral IV - Single Lumen 11/25/20 1509 18 G Right Forearm less than 1 day                Physical Exam  Constitutional:       Appearance: She is well-developed.   HENT:      Head: Normocephalic and atraumatic.   Eyes:      General: Scleral icterus present.   Cardiovascular:      Rate and Rhythm: Normal rate and regular rhythm.   Abdominal:      General: Bowel sounds are normal. There is distension.      Palpations: Abdomen is soft.   Neurological:      Mental Status: She is alert and oriented to person, place, and time. Mental status is at baseline.   Psychiatric:         Mood and Affect: Mood normal.         Behavior: Behavior " normal. Behavior is cooperative.         Thought Content: Thought content normal.         Judgment: Judgment normal.         Significant Labs:  CBC:   Recent Labs   Lab 11/25/20  1319 11/26/20  0845   WBC 6.00 6.42   HGB 6.1* 6.3*   HCT 18.5* 19.3*   PLT 63* 56*     CMP:   Recent Labs   Lab 11/26/20  0845   *   CALCIUM 8.9   ALBUMIN 2.5*   PROT 9.1*   *   K 3.7   CO2 19*   CL 99   BUN 33*   CREATININE 1.7*   ALKPHOS 154*   ALT 29   *   BILITOT 18.0*     Coagulation:   Recent Labs   Lab 11/25/20  1741   INR 1.9*         Significant Imaging:  Imaging results within the past 24 hours have been reviewed.    Assessment/Plan:     Active Diagnoses:    Diagnosis Date Noted POA    Acute pulmonary edema [J81.0] 11/25/2020 Yes    Acute exacerbation of CHF (congestive heart failure) [I50.9] 11/25/2020 Yes    Elevated bilirubin [R17] 11/25/2020 Yes    Anemia [D64.9] 11/25/2020 Yes    Alcoholic cirrhosis of liver with ascites [K70.31] 11/25/2020 Yes      Problems Resolved During this Admission:       A: 44 y.o female with acute CHF and decompensated alcohol cirrhosis with ascites complicated by non-immune hemolytic anemia    1. Alcoholic cirrhosis of liver with ascites  MELD-Na score: 31 at 11/26/2020  8:45 AM  MELD score: 30 at 11/26/2020  8:45 AM  Calculated from:  Serum Creatinine: 1.7 mg/dL at 11/26/2020  8:45 AM  Serum Sodium: 133 mmol/L at 11/26/2020  8:45 AM  Total Bilirubin: 18.0 mg/dL at 11/26/2020  8:45 AM  INR(ratio): 1.9 at 11/25/2020  5:41 PM  Age: 44 years 10 months  - decompensated  - one beer on 11/23/20 for her daughter's birthday  -CT scan of the abdomen and pelvis with and without contrast liver protocol     2. 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   -  patient had evidence of pulmonary hypertension before.  It may be secondary to cardiac reasons but once patient's thought to be euvolemic if she still has elevated PA pressures recommend right heart catheterization      Recommendations:  1. Consider Coomb's test  2. Agree with transfusion  3. Repeat echo once patient is euvolemic  4. F/U CT results    Discussed with Lulu VASQUEZ, [facility] medicine. I will follow-up with patient. Please contact us if you have any additional questions.    Rosanna Pablo MD  Gastroenterology  [facility]

## 2020-11-26 NOTE — PLAN OF CARE
Ongoing (interventions implemented as appropriate)  VSS  Pt able to make needs known.  Pt remained afebrile throughout this shift.   Pt remained free of falls this shift.   Pt denies pain this shift.  Plan of care reviewed. Patient verbalizes understanding.   Pt moving/turing independent. Frequent weight shifting encouraged.  Patient sinus rhythm on monitor.   Bed low, side rails up x 2, wheels locked, call light in reach.   Hourly rounding completed.   Will continue to monitor.

## 2020-11-26 NOTE — NURSING
Pt admitted to tele room 254 from ER. Transported by 1 nurse via stretcher, on monitor and O2. VS taken and stable, except BP elevated. Pt has been hypertensive in ER. Pt placed on 1.5 L O2 via NC and tele monitor. Pure wick placed at pt request. She states she feels too weak and short of breath to ambulate at this time to bathroom. Pt refused BSC also. No skin issues noted. 4 eyes on skin with ER nurse, Bautista. Bed alarm set. Oriented pt to room and call light. Explained fall precautions. Will transfuse blood, per order, when  ready. Will continue to monitor.

## 2020-11-26 NOTE — ASSESSMENT & PLAN NOTE
Patient has a history of cirrhosis   - Total Bilirubin 15.8  - GI consulted  - Will follow GI recs    11/26/2020  Triple phase CT pending  Will check direct and indirect bilirubin

## 2020-11-26 NOTE — ASSESSMENT & PLAN NOTE
MELD-Na score: 31 at 11/26/2020  8:45 AM  MELD score: 30 at 11/26/2020  8:45 AM  Calculated from:  Serum Creatinine: 1.7 mg/dL at 11/26/2020  8:45 AM  Serum Sodium: 133 mmol/L at 11/26/2020  8:45 AM  Total Bilirubin: 18.0 mg/dL at 11/26/2020  8:45 AM  INR(ratio): 1.9 at 11/25/2020  5:41 PM  Age: 44 years 10 months    GI input appreciated  Will order Triple phase CT once kidney function recovers

## 2020-11-26 NOTE — PROGRESS NOTES
[facility] Hospital Medicine  Progress Note    Patient Name: John Wray  MRN: [id number]  Patient Class: IP- Inpatient   Admission Date: 11/25/2020  Length of Stay: 1 days  Attending Physician: Klaus Chowdary MD  Primary Care Provider: Primary Doctor No    Subjective:     Principal Problem:<principal problem not specified>        HPI:  Ms Wray is a 44 y.o. female patient with a PMHx of cirrhosis, HTN who presents to the ED endorsing for SOB which started last night. She states the SOB worsens when she lays flat but at her baseline is able to do so. Patient denies any fever, chills, n/v/d, blood in stool, CP, weakness, numbness, dizziness, headache, and all other sxs at this time. Pt reports non compliance and states that she did not take her BP medication this morning, and has not taken her Lasix in several days.   ED workup:  BNP 3105, BUN 22, Cr 1.1, Bilirubin 15.8, Alkaline Phosphatase 192, , UA, H/H: 6.1/18.5. CXR showed: mild congestive heart failure/volume overload. Worse since 07/12/2020. x1 unit of PRBC transfused for anemia. GI consulted due to concern of cirrhosis. Occult stool pending. She received IV hydralazine and Lasix 80mg IV x1 dose.  contacted for symptom management. Patient will admitted under  and placed under observation.   Patient is a Full Code and her SDM is her daughter Kalee Brooke: [phone]    Overview/Hospital Course:  11/25: Ms Wray is a 44 yo F with phmx of chirrosis, HTN, and CHF who presented to the ED endorsing SOB. Reports noncompliance with medications. Bilirubin, BNP, and AST elevated. GI consulted. CXR showed mild CHF and volume overload. Diuresed with Lasix 80mg IV in the ED. Admitted to  OBS for symptom management. As of 11/26/20 haptoglobin and reticulocytosis are most consistent with hemolytic anemia. 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 Will check direct and indirect bilirubin as well as lipid panel. Triple  phase CT is pending to further evaluate liver. Hemoglobin today 6.3 following one unit of blood (likely from hemolytic anemia). No obvious signs of GI blood loss. GI is following. Will transfuse 2 units PRBCs today. Gastoenterology service is following. Worsening renal failure likely due to worsening anemia. Will repeat BMP in AM following 2 units. Thrombocytopenia noted. No need to transfuse. Will continue to diurese and monitor response.    Interval History:hemoglobin remains 6.3 today despite one unit. Questions and concerns addressing regarding hemolytic anemia. When daughter came to visit, patient admitted to drinking 1/2 pint of ETOH 4-5 days ago.    Review of Systems   Constitutional: Positive for activity change. Negative for appetite change, chills and unexpected weight change.   HENT: Negative for congestion, mouth sores, sinus pressure, sore throat and trouble swallowing.    Eyes: Negative for photophobia, redness and visual disturbance.   Respiratory: Positive for shortness of breath. Negative for cough and wheezing.    Cardiovascular: Negative for chest pain, palpitations and leg swelling.   Gastrointestinal: Negative for abdominal distention, abdominal pain, blood in stool, constipation, diarrhea, nausea and vomiting.   Endocrine: Negative for polydipsia, polyphagia and polyuria.   Genitourinary: Negative for flank pain, frequency and hematuria.   Musculoskeletal: Negative for back pain and neck stiffness.   Skin: Negative for pallor and rash.   Allergic/Immunologic: Negative for immunocompromised state.   Neurological: Negative for dizziness, tremors, seizures, syncope, speech difficulty, weakness and headaches.   Hematological: Negative for adenopathy. Does not bruise/bleed easily.   Psychiatric/Behavioral: Negative for agitation, confusion and suicidal ideas.      Objective:     Vital Signs (Most Recent):  Temp: 98.1 °F (36.7 °C) (11/26/20 1137)  Pulse: 71 (11/26/20 1137)  Resp: 18 (11/26/20  1137)  BP: 133/63 (11/26/20 1137)  SpO2: (!) 94 % (11/26/20 1137) Vital Signs (24h Range):  Temp:  [96.2 °F (35.7 °C)-99.2 °F (37.3 °C)] 98.1 °F (36.7 °C)  Pulse:  [67-86] 71  Resp:  [18-26] 18  SpO2:  [92 %-99 %] 94 %  BP: (123-194)/(57-88) 133/63     Weight: 80.2 kg (176 lb 12.9 oz)  Body mass index is 28.54 kg/m².    Intake/Output Summary (Last 24 hours) at 11/26/2020 1511  Last data filed at 11/26/2020 0500  Gross per 24 hour   Intake 876.67 ml   Output 1000 ml   Net -123.33 ml      Physical Exam  Vitals signs reviewed.   Constitutional:       General: She is not in acute distress.     Appearance: She is well-developed.   HENT:      Head: Normocephalic and atraumatic.      Mouth/Throat:      Pharynx: No oropharyngeal exudate.   Eyes:      General: Scleral icterus present.         Right eye: No discharge.         Left eye: No discharge.      Pupils: Pupils are equal, round, and reactive to light.   Pulmonary:      Effort: Pulmonary effort is normal. No respiratory distress.      Breath sounds: No wheezing.      Comments: Decreased as bases  Abdominal:      General: There is distension (nontense).      Palpations: Abdomen is soft.      Tenderness: There is no abdominal tenderness.   Musculoskeletal:      Right lower leg: Edema present.      Left lower leg: Edema present.   Neurological:      Mental Status: She is alert and oriented to person, place, and time.   Psychiatric:         Behavior: Behavior normal.       Significant Labs:   CBC:   Recent Labs   Lab 11/25/20  1319 11/26/20  0845   WBC 6.00 6.42   HGB 6.1* 6.3*   HCT 18.5* 19.3*   PLT 63* 56*     CMP:   Recent Labs   Lab 11/25/20  1319 11/26/20  0845   * 133*   K 5.1 3.7   CL 98 99   CO2 15* 19*   * 127*   BUN 22* 33*   CREATININE 1.1 1.7*   CALCIUM 9.1 8.9   PROT 10.6* 9.1*   ALBUMIN 2.8* 2.5*   BILITOT 15.8* 18.0*   ALKPHOS 192* 154*   * 111*   ALT 32 29   ANIONGAP 19* 15   EGFRNONAA >60 36*       Significant Imaging: I have reviewed  all pertinent imaging results/findings within the past 24 hours.      Assessment/Plan:      Thrombocytopenia  Consequence of cirrhosis and bone marrow suppression from EtOH  56 today. No need to transfuse      Elevated troponin  --suspect demand ischemia from CHF and marked anemia  --trend troponin levels  --repeat Echocardiogram once euvolemic      Renal injury  1.1>1.7. could be related to worsening anemia and CHF  --treat anemia and repeat BMP in AM        Alcoholic cirrhosis of liver with ascites  MELD-Na score: 31 at 11/26/2020  8:45 AM  MELD score: 30 at 11/26/2020  8:45 AM  Calculated from:  Serum Creatinine: 1.7 mg/dL at 11/26/2020  8:45 AM  Serum Sodium: 133 mmol/L at 11/26/2020  8:45 AM  Total Bilirubin: 18.0 mg/dL at 11/26/2020  8:45 AM  INR(ratio): 1.9 at 11/25/2020  5:41 PM  Age: 44 years 10 months    GI input appreciated  Will order Triple phase CT once kidney function recovers  Patient initially reports abstinence of alcohol, but story changed later when daughter arrived. She drank 1/2 pint of ETOH 4-5 days ago. Counseled on this and the delay of getting on transplant list.    Hemolytic anemia  -- abnormal haptoglobin (<10), LDH(317), and reticulocytes (3) consistent with hemolytic anemia. 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   --expected with improve with cessation of alcohol  --treat symptoms  --non autoimmunie so steroid therapy is not warranted  --s/p one unit of PRBC on 11/25. Will give 2 additional units today given 6.3 hemologlobin  --will check unconjugated bilirubin and lipid panel as this is also elevated in Zieve Syndrome.   --no other signs of GI loss    Elevated bilirubin  Patient has a history of cirrhosis   - Total Bilirubin 15.8  - GI consulted  - Will follow GI recs    11/26/2020  Triple phase CT pending  Will check direct and indirect bilirubin      Acute exacerbation of CHF (congestive heart failure)  -BNP 3105  -Diuresed in ED with IV lasix  -Continue IV lasix  -Daily  weights  -Strict I&Os    Acute pulmonary edema  -Admit to OBS  -CXR showed mild CHF/volume overload  -Echo performed 7/9/2020 showed an EF of 55% and diastolic dysfunction  -BNP elevated  -Diuresed in ED with x1 dose of IV Lasix  -Continue IV Lasix  -Daily weights  -Strict I&O's        VTE Risk Mitigation (From admission, onward)         Ordered     IP VTE HIGH RISK PATIENT  Once      11/25/20 1608     Place sequential compression device  Until discontinued      11/25/20 1608                Discharge Planning   FINN:      Code Status: Full Code   Is the patient medically ready for discharge?:     Reason for patient still in hospital (select all that apply): Treatment  Discharge Plan A: Home with family        Jacob Campos NP  Department of Hospital Medicine   [facility]

## 2020-11-26 NOTE — PLAN OF CARE
1 unit PRBCs currently transfusing. Pt tolerating well. Monitoring for s/s of reaction and fluid overload. Pt denies any pain and is in no distress at this time. VS stable. Will continue to monitor.

## 2020-11-26 NOTE — ASSESSMENT & PLAN NOTE
-- abnormal haptoglobin (<10), LDH(317), and reticulocytes (3) consistent with hemolytic anemia. 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   --expected with improve with cessation of alcohol  --treat symptoms  --non autoimmunie so steroid therapy is not warranted  --s/p one unit of PRBC on 11/25. Will give 2 additional units today given 6.3 hemologlobin  --will check unconjugated bilirubin and lipid panel as this is also elevated in Zieve Syndrome.   --no other signs of GI loss

## 2020-11-26 NOTE — ASSESSMENT & PLAN NOTE
--suspect demand ischemia from CHF and marked anemia  --trend troponin levels  --repeat Echocardiogram once euvolemic

## 2020-11-26 NOTE — SUBJECTIVE & OBJECTIVE
Interval History:hemoglobin remains 6.3 today despite one unit. Questions and concerns addressing regarding hemolytic anemia.    Review of Systems   Constitutional: Positive for activity change. Negative for appetite change, chills and unexpected weight change.   HENT: Negative for congestion, mouth sores, sinus pressure, sore throat and trouble swallowing.    Eyes: Negative for photophobia, redness and visual disturbance.   Respiratory: Positive for shortness of breath. Negative for cough and wheezing.    Cardiovascular: Negative for chest pain, palpitations and leg swelling.   Gastrointestinal: Negative for abdominal distention, abdominal pain, blood in stool, constipation, diarrhea, nausea and vomiting.   Endocrine: Negative for polydipsia, polyphagia and polyuria.   Genitourinary: Negative for flank pain, frequency and hematuria.   Musculoskeletal: Negative for back pain and neck stiffness.   Skin: Negative for pallor and rash.   Allergic/Immunologic: Negative for immunocompromised state.   Neurological: Negative for dizziness, tremors, seizures, syncope, speech difficulty, weakness and headaches.   Hematological: Negative for adenopathy. Does not bruise/bleed easily.   Psychiatric/Behavioral: Negative for agitation, confusion and suicidal ideas.      Objective:     Vital Signs (Most Recent):  Temp: 98.1 °F (36.7 °C) (11/26/20 1137)  Pulse: 71 (11/26/20 1137)  Resp: 18 (11/26/20 1137)  BP: 133/63 (11/26/20 1137)  SpO2: (!) 94 % (11/26/20 1137) Vital Signs (24h Range):  Temp:  [96.2 °F (35.7 °C)-99.2 °F (37.3 °C)] 98.1 °F (36.7 °C)  Pulse:  [67-86] 71  Resp:  [18-26] 18  SpO2:  [92 %-99 %] 94 %  BP: (123-194)/(57-88) 133/63     Weight: 80.2 kg (176 lb 12.9 oz)  Body mass index is 28.54 kg/m².    Intake/Output Summary (Last 24 hours) at 11/26/2020 1511  Last data filed at 11/26/2020 0500  Gross per 24 hour   Intake 876.67 ml   Output 1000 ml   Net -123.33 ml      Physical Exam  Vitals signs reviewed.    Constitutional:       General: She is not in acute distress.     Appearance: She is well-developed.   HENT:      Head: Normocephalic and atraumatic.      Mouth/Throat:      Pharynx: No oropharyngeal exudate.   Eyes:      General: Scleral icterus present.         Right eye: No discharge.         Left eye: No discharge.      Pupils: Pupils are equal, round, and reactive to light.   Pulmonary:      Effort: Pulmonary effort is normal. No respiratory distress.      Breath sounds: No wheezing.      Comments: Decreased as bases  Abdominal:      General: There is distension (nontense).      Palpations: Abdomen is soft.      Tenderness: There is no abdominal tenderness.   Musculoskeletal:      Right lower leg: Edema present.      Left lower leg: Edema present.   Neurological:      Mental Status: She is alert and oriented to person, place, and time.   Psychiatric:         Behavior: Behavior normal.       Significant Labs:   CBC:   Recent Labs   Lab 11/25/20  1319 11/26/20  0845   WBC 6.00 6.42   HGB 6.1* 6.3*   HCT 18.5* 19.3*   PLT 63* 56*     CMP:   Recent Labs   Lab 11/25/20  1319 11/26/20  0845   * 133*   K 5.1 3.7   CL 98 99   CO2 15* 19*   * 127*   BUN 22* 33*   CREATININE 1.1 1.7*   CALCIUM 9.1 8.9   PROT 10.6* 9.1*   ALBUMIN 2.8* 2.5*   BILITOT 15.8* 18.0*   ALKPHOS 192* 154*   * 111*   ALT 32 29   ANIONGAP 19* 15   EGFRNONAA >60 36*       Significant Imaging: I have reviewed all pertinent imaging results/findings within the past 24 hours.

## 2020-11-26 NOTE — PLAN OF CARE
Pt free from fall/injury/trauma  Skin intact with no evidence of breakdown  Pt up with assistance to restroom  Pt monitored on telemetry   2 units of blood pending transfusion  Pending stool collection  Hematocrit critically low  Will continue to monitor

## 2020-11-26 NOTE — PLAN OF CARE
SW met with pt at bedside to complete discharge assessment. Pt lives at home with her two sons and daughter in law. Pt help at home is her daughter. Pt daughter will pick her up when she is discharged. Pt uses a RW at home as needed. Pt stated her RW is broke and she needs another one. Pt asked if she could get another RW. No other needs identified at this time. SW provided a transitional care folder, information on advanced directives, information on pharmacy bedside delivery, and discharge planning begins on admission with contact information for any needs/questions. Pt board updated with Sw name and number.     Payor: MEDICAID / Plan: [place] [employer] [facility] ([place] MEDICAID) / Product Type: Managed Medicaid /   PCP: Primary Doctor No  Pharmacy:   [id number] - [place] - [address] AT [facility]  [address]  [place] [address]  Phone: [phone] Fax: [phone]  Bedside Delivery: N/A  MyChart: link sent       11/26/20 8703   Discharge Assessment   Assessment Type Final Discharge Note   Confirmed/corrected address and phone number on facesheet? Yes   Assessment information obtained from? Patient   Expected Length of Stay (days)   (TBD)   Communicated expected length of stay with patient/caregiver yes   Prior to hospitilization cognitive status: Alert/Oriented   Prior to hospitalization functional status: Independent;Assistive Equipment   Current cognitive status: Alert/Oriented   Current Functional Status: Independent;Assistive Equipment   Facility Arrived From: home   Lives With child(brenda), adult;child(brenda), dependent   Able to Return to Prior Arrangements yes   Is patient able to care for self after discharge? Yes   Who are your caregiver(s) and their phone number(s)? Kalee Cox (daughter) [phone]   Patient's perception of discharge disposition home or selfcare   Readmission Within the Last 30 Days no previous admission in last 30 days    Patient currently being followed by outpatient case management? No   Patient currently receives any other outside agency services? No   Equipment Currently Used at Home walker, rolling   Part D Coverage n/a   Do you have any problems affording any of your prescribed medications? No   Is the patient taking medications as prescribed? yes   Does the patient have transportation home? Yes   Transportation Anticipated family or friend will provide   Dialysis Name and Scheduled days n/a   Does the patient receive services at the [facility]madin Clinic? No   Discharge Plan A Home with family   Discharge Plan B Home with family   DME Needed Upon Discharge  none   Patient/Family in Agreement with Plan yes   Does the patient have transportation to healthcare appointments? Yes   Readmission Questionnaire   Have you felt down, depressed, or hopeless? 0   Have you felt little interest or pleasure in doing things? 0       Bill Elder LMSW 11/26/2020 9:04 AM

## 2020-11-27 LAB
ALBUMIN SERPL BCP-MCNC: 2.4 G/DL (ref 3.5–5.2)
ALP SERPL-CCNC: 165 U/L (ref 55–135)
ALT SERPL W/O P-5'-P-CCNC: 28 U/L (ref 10–44)
ANION GAP SERPL CALC-SCNC: 13 MMOL/L (ref 8–16)
AST SERPL-CCNC: 95 U/L (ref 10–40)
BASOPHILS # BLD AUTO: 0.04 K/UL (ref 0–0.2)
BASOPHILS NFR BLD: 0.7 % (ref 0–1.9)
BILIRUB SERPL-MCNC: 11.8 MG/DL (ref 0.1–1)
BNP SERPL-MCNC: 1814 PG/ML (ref 0–99)
BUN SERPL-MCNC: 38 MG/DL (ref 6–20)
CALCIUM SERPL-MCNC: 8.8 MG/DL (ref 8.7–10.5)
CHLORIDE SERPL-SCNC: 99 MMOL/L (ref 95–110)
CO2 SERPL-SCNC: 22 MMOL/L (ref 23–29)
CREAT SERPL-MCNC: 2.1 MG/DL (ref 0.5–1.4)
CREAT UR-MCNC: 31.8 MG/DL (ref 15–325)
DIFFERENTIAL METHOD: ABNORMAL
EOSINOPHIL # BLD AUTO: 0.2 K/UL (ref 0–0.5)
EOSINOPHIL NFR BLD: 3.6 % (ref 0–8)
ERYTHROCYTE [DISTWIDTH] IN BLOOD BY AUTOMATED COUNT: 17.7 % (ref 11.5–14.5)
EST. GFR  (AFRICAN AMERICAN): 33 ML/MIN/1.73 M^2
EST. GFR  (NON AFRICAN AMERICAN): 28 ML/MIN/1.73 M^2
GLUCOSE SERPL-MCNC: 97 MG/DL (ref 70–110)
HCT VFR BLD AUTO: 22.1 % (ref 37–48.5)
HCT VFR BLD AUTO: 22.7 % (ref 37–48.5)
HGB BLD-MCNC: 7.6 G/DL (ref 12–16)
HGB BLD-MCNC: 8.3 G/DL (ref 12–16)
IMM GRANULOCYTES # BLD AUTO: 0.03 K/UL (ref 0–0.04)
IMM GRANULOCYTES NFR BLD AUTO: 0.6 % (ref 0–0.5)
LYMPHOCYTES # BLD AUTO: 0.9 K/UL (ref 1–4.8)
LYMPHOCYTES NFR BLD: 17 % (ref 18–48)
MCH RBC QN AUTO: 31.7 PG (ref 27–31)
MCHC RBC AUTO-ENTMCNC: 34.4 G/DL (ref 32–36)
MCV RBC AUTO: 92 FL (ref 82–98)
MONOCYTES # BLD AUTO: 0.7 K/UL (ref 0.3–1)
MONOCYTES NFR BLD: 12.1 % (ref 4–15)
NEUTROPHILS # BLD AUTO: 3.5 K/UL (ref 1.8–7.7)
NEUTROPHILS NFR BLD: 66 % (ref 38–73)
NRBC BLD-RTO: 1 /100 WBC
PLATELET # BLD AUTO: 46 K/UL (ref 150–350)
PMV BLD AUTO: 11.2 FL (ref 9.2–12.9)
POTASSIUM SERPL-SCNC: 3.2 MMOL/L (ref 3.5–5.1)
PROT SERPL-MCNC: 8.6 G/DL (ref 6–8.4)
RBC # BLD AUTO: 2.4 M/UL (ref 4–5.4)
SODIUM SERPL-SCNC: 134 MMOL/L (ref 136–145)
SODIUM UR-SCNC: 109 MMOL/L (ref 20–250)
TROPONIN I SERPL DL<=0.01 NG/ML-MCNC: 0.06 NG/ML (ref 0–0.03)
WBC # BLD AUTO: 5.35 K/UL (ref 3.9–12.7)

## 2020-11-27 PROCEDURE — 25000003 PHARM REV CODE 250: Performed by: NURSE PRACTITIONER

## 2020-11-27 PROCEDURE — 97802 MEDICAL NUTRITION INDIV IN: CPT

## 2020-11-27 PROCEDURE — S4991 NICOTINE PATCH NONLEGEND: HCPCS | Performed by: NURSE PRACTITIONER

## 2020-11-27 PROCEDURE — 63600175 PHARM REV CODE 636 W HCPCS: Performed by: NURSE PRACTITIONER

## 2020-11-27 PROCEDURE — 99231 SBSQ HOSP IP/OBS SF/LOW 25: CPT | Mod: ,,, | Performed by: PHYSICIAN ASSISTANT

## 2020-11-27 PROCEDURE — 94761 N-INVAS EAR/PLS OXIMETRY MLT: CPT

## 2020-11-27 PROCEDURE — 36415 COLL VENOUS BLD VENIPUNCTURE: CPT

## 2020-11-27 PROCEDURE — 84300 ASSAY OF URINE SODIUM: CPT

## 2020-11-27 PROCEDURE — 83880 ASSAY OF NATRIURETIC PEPTIDE: CPT

## 2020-11-27 PROCEDURE — 99231 PR SUBSEQUENT HOSPITAL CARE,LEVL I: ICD-10-PCS | Mod: ,,, | Performed by: PHYSICIAN ASSISTANT

## 2020-11-27 PROCEDURE — 85025 COMPLETE CBC W/AUTO DIFF WBC: CPT

## 2020-11-27 PROCEDURE — 94760 N-INVAS EAR/PLS OXIMETRY 1: CPT

## 2020-11-27 PROCEDURE — 84484 ASSAY OF TROPONIN QUANT: CPT

## 2020-11-27 PROCEDURE — 85018 HEMOGLOBIN: CPT

## 2020-11-27 PROCEDURE — 21400001 HC TELEMETRY ROOM

## 2020-11-27 PROCEDURE — 82570 ASSAY OF URINE CREATININE: CPT

## 2020-11-27 PROCEDURE — 80053 COMPREHEN METABOLIC PANEL: CPT

## 2020-11-27 PROCEDURE — 85014 HEMATOCRIT: CPT

## 2020-11-27 RX ORDER — IBUPROFEN 200 MG
1 TABLET ORAL DAILY
Status: DISCONTINUED | OUTPATIENT
Start: 2020-11-27 | End: 2020-11-29 | Stop reason: HOSPADM

## 2020-11-27 RX ORDER — POTASSIUM CHLORIDE 20 MEQ/1
40 TABLET, EXTENDED RELEASE ORAL ONCE
Status: COMPLETED | OUTPATIENT
Start: 2020-11-27 | End: 2020-11-27

## 2020-11-27 RX ORDER — ALPRAZOLAM 1 MG/1
1 TABLET ORAL NIGHTLY PRN
Status: DISCONTINUED | OUTPATIENT
Start: 2020-11-27 | End: 2020-11-29 | Stop reason: HOSPADM

## 2020-11-27 RX ORDER — ALPRAZOLAM 1 MG/1
1 TABLET ORAL ONCE
Status: COMPLETED | OUTPATIENT
Start: 2020-11-27 | End: 2020-11-27

## 2020-11-27 RX ADMIN — Medication 100 MG: at 10:11

## 2020-11-27 RX ADMIN — PROPRANOLOL HYDROCHLORIDE 20 MG: 20 TABLET ORAL at 10:11

## 2020-11-27 RX ADMIN — FUROSEMIDE 40 MG: 10 INJECTION, SOLUTION INTRAMUSCULAR; INTRAVENOUS at 10:11

## 2020-11-27 RX ADMIN — ALPRAZOLAM 1 MG: 1 TABLET ORAL at 12:11

## 2020-11-27 RX ADMIN — Medication 6 MG: at 08:11

## 2020-11-27 RX ADMIN — PROPRANOLOL HYDROCHLORIDE 20 MG: 20 TABLET ORAL at 08:11

## 2020-11-27 RX ADMIN — SERTRALINE HYDROCHLORIDE 25 MG: 25 TABLET ORAL at 10:11

## 2020-11-27 RX ADMIN — FOLIC ACID 1000 MCG: 1 TABLET ORAL at 10:11

## 2020-11-27 RX ADMIN — ALPRAZOLAM 1 MG: 1 TABLET ORAL at 08:11

## 2020-11-27 RX ADMIN — Medication 1 PATCH: at 10:11

## 2020-11-27 RX ADMIN — POTASSIUM CHLORIDE 40 MEQ: 1500 TABLET, EXTENDED RELEASE ORAL at 10:11

## 2020-11-27 NOTE — ASSESSMENT & PLAN NOTE
11/25  Unclear etiology of bleeding.  There has been no obvious GI bleeding.  It is possible that the anemia could be worsened by the bleeding she has had from her mouth or consideration of a hemolytic process.  She has had a low haptoglobin before but uncertain that is related to her liver disease.  Often patients with alcohol related liver disease due have unclear anemia is related to bone marrow issues.  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  -Possible hemolytic anemia. Continue to monitor labs. Recommend transfusion of 1 unit to get hgb >8.   -Kidney function worsened overnight. Continue to monitor.

## 2020-11-27 NOTE — ASSESSMENT & PLAN NOTE
11/25  This seems to be patient's primary problem this admission.  She does have cirrhosis that is decompensated that seems to be relatively stable.  It certainly can be worsened by CHF exacerbation.  -management per primary team but would consider repeat echocardiogram especially when patient is euvolemic.  -patient had evidence of pulmonary hypertension before.  It may be secondary to cardiac reasons but once patient's thought to be euvolemic if she still has elevated PA pressures recommend right heart catheterization

## 2020-11-27 NOTE — ASSESSMENT & PLAN NOTE
--suspect demand ischemia from CHF and marked anemia  --trend troponin levels  --repeat Echocardiogram once euvolemic    11/27/2020  Downward trend.   Repeat Echocardiogram tomorrow

## 2020-11-27 NOTE — PROGRESS NOTES
[facility] -   Gastroenterology  Progress Note    Patient Name: John Wray  MRN: [id number]  Admission Date: 11/25/2020  Hospital Length of Stay: 2 days  Code Status: Full Code   Attending Provider: Yonas Red MD  Consulting Provider: Maryjane Chapa PA-C  Primary Care Physician: Primary Doctor No  Principal Problem: <principal problem not specified>      Subjective:     Interval History:   No acute events overnight. Patient states she slept comfortably. Hemoglobin 6.3 yesterday - transfused 2 units on 11/25. Up to 7.6. Kidney function worsening. T bili decreased from 18 to 11.8. Denies any complaints at this time.    MELD-Na score: 31 at 11/27/2020  5:12 AM  MELD score: 30 at 11/27/2020  5:12 AM  Calculated from:  Serum Creatinine: 2.1 mg/dL at 11/27/2020  5:12 AM  Serum Sodium: 134 mmol/L at 11/27/2020  5:12 AM  Total Bilirubin: 11.8 mg/dL at 11/27/2020  5:12 AM  INR(ratio): 1.9 at 11/25/2020  5:41 PM  Age: 44 years 10 months    Review of Systems   Constitutional: Negative for activity change, appetite change, chills and diaphoresis.   Respiratory: Negative for cough and shortness of breath.    Cardiovascular: Negative for chest pain.   Gastrointestinal: Negative for abdominal distention, abdominal pain, blood in stool, constipation, diarrhea, nausea and vomiting.   Neurological: Negative for dizziness, weakness and light-headedness.   Psychiatric/Behavioral: Negative for dysphoric mood. The patient is not nervous/anxious.      Objective:     Vital Signs (Most Recent):  Temp: 98.4 °F (36.9 °C) (11/27/20 0826)  Pulse: 64 (11/27/20 0826)  Resp: 18 (11/27/20 0826)  BP: 131/62 (11/27/20 0826)  SpO2: 98 % (11/27/20 0826) Vital Signs (24h Range):  Temp:  [96.8 °F (36 °C)-98.9 °F (37.2 °C)] 98.4 °F (36.9 °C)  Pulse:  [64-71] 64  Resp:  [18-22] 18  SpO2:  [94 %-98 %] 98 %  BP: (112-134)/(53-72) 131/62     Weight: 82.5 kg (181 lb 14.1 oz) (11/27/20 0600)  Body mass index is 29.36  kg/m².      Intake/Output Summary (Last 24 hours) at 11/27/2020 1050  Last data filed at 11/27/2020 0500  Gross per 24 hour   Intake 2246.67 ml   Output 400 ml   Net 1846.67 ml       Lines/Drains/Airways     Peripheral Intravenous Line                 Peripheral IV - Single Lumen 11/25/20 20 G Left Hand 2 days         Peripheral IV - Single Lumen 11/25/20 1509 18 G Right Forearm 1 day                Physical Exam  Constitutional:       General: She is not in acute distress.     Appearance: Normal appearance. She is obese. She is not ill-appearing, toxic-appearing or diaphoretic.   HENT:      Head: Normocephalic and atraumatic.   Eyes:      General: Scleral icterus present.      Extraocular Movements: Extraocular movements intact.   Neck:      Musculoskeletal: Normal range of motion.   Cardiovascular:      Rate and Rhythm: Normal rate and regular rhythm.   Pulmonary:      Effort: Pulmonary effort is normal. No respiratory distress.      Breath sounds: Normal breath sounds. No stridor. No wheezing.   Abdominal:      General: Bowel sounds are normal. There is no distension.      Palpations: Abdomen is soft. There is no mass.      Tenderness: There is no abdominal tenderness. There is no guarding.   Musculoskeletal: Normal range of motion.   Skin:     General: Skin is warm and dry.      Coloration: Skin is not pale.      Findings: No erythema.   Neurological:      General: No focal deficit present.      Mental Status: She is alert and oriented to person, place, and time.      Comments: No asterixis.   Psychiatric:         Mood and Affect: Mood normal.         Behavior: Behavior normal.         Significant Labs:  CBC:   Recent Labs   Lab 11/25/20  1319 11/26/20  0845 11/27/20  0512   WBC 6.00 6.42 5.35   HGB 6.1* 6.3* 7.6*   HCT 18.5* 19.3* 22.1*   PLT 63* 56* 46*     CMP:   Recent Labs   Lab 11/27/20  0512   GLU 97   CALCIUM 8.8   ALBUMIN 2.4*   PROT 8.6*   *   K 3.2*   CO2 22*   CL 99   BUN 38*   CREATININE 2.1*    ALKPHOS 165*   ALT 28   AST 95*   BILITOT 11.8*     Coagulation:   Recent Labs   Lab 11/25/20  1741   INR 1.9*         Significant Imaging:  Imaging results within the past 24 hours have been reviewed.    Assessment/Plan:     Alcoholic cirrhosis of liver with ascites  11/25  -Patient has had decompensated alcohol cirrhosis with elevated meld score.  Her bilirubin is elevated but only slightly compared to baseline.  This elevation could be explained by her heart failure. We have been trying to get further evaluation of her liver and alisia hepatis based on abnormal ultrasound from July.   -monitor CMP, CBC and INR daily  -monitor for evidence of encephalopathy    11/27  -CT scan of the abdomen and pelvis with and without contrast liver protocol. Has not yet been completed.  -Please see yesterday's note. Patient admits to recent alcohol use.     MELD-Na score: 31 at 11/27/2020  5:12 AM  MELD score: 30 at 11/27/2020  5:12 AM  Calculated from:  Serum Creatinine: 2.1 mg/dL at 11/27/2020  5:12 AM  Serum Sodium: 134 mmol/L at 11/27/2020  5:12 AM  Total Bilirubin: 11.8 mg/dL at 11/27/2020  5:12 AM  INR(ratio): 1.9 at 11/25/2020  5:41 PM  Age: 44 years 10 months    Hemolytic anemia  11/25  Unclear etiology of bleeding.  There has been no obvious GI bleeding.  It is possible that the anemia could be worsened by the bleeding she has had from her mouth or consideration of a hemolytic process.  She has had a low haptoglobin before but uncertain that is related to her liver disease.  Often patients with alcohol related liver disease due have unclear anemia is related to bone marrow issues.  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  -Possible hemolytic anemia. Continue to monitor labs. Recommend transfusion of 1 unit to get hgb >8.   -Kidney function worsened overnight. Continue to monitor.      Elevated bilirubin  -Continue to monitor.  -Decreased from 18 to 11.    Acute exacerbation of CHF (congestive heart failure)  11/25  This seems to be patient's primary problem this admission.  She does have cirrhosis that is decompensated that seems to be relatively stable.  It certainly can be worsened by CHF exacerbation.  -management per primary team but would consider repeat echocardiogram especially when patient is euvolemic.  -patient had evidence of pulmonary hypertension before.  It may be secondary to cardiac reasons but once patient's thought to be euvolemic if she still has elevated PA pressures recommend right heart catheterization        Thank you for your consult. I will follow-up with patient. Please contact us if you have any additional questions.    Maryjane Chapa PA-C  Gastroenterology  [facility]

## 2020-11-27 NOTE — ASSESSMENT & PLAN NOTE
-BNP 3105  -Diuresed in ED with IV lasix  -Continue IV lasix  -Daily weights  -Strict I&Os    11/27/2020  Repeat BNP today. Continue Lasix 40mg IV. May need additional dose today. Follow up CXR.   Repeat Echocardiogram tomorrow

## 2020-11-27 NOTE — ASSESSMENT & PLAN NOTE
Patient has a history of cirrhosis   - Total Bilirubin 15.8  - GI consulted  - Will follow GI recs    11/26/2020  Triple phase CT pending  Will check direct and indirect bilirubin    11/27/2020  Bilirubin is trending down

## 2020-11-27 NOTE — PLAN OF CARE
Recommendations    Recommendation:   1. Add Novasource 2x/day   2. RD to f/u    Goals: Meet >75% EEN/EPN by RD f/u  Nutrition Goal Status: new  Communication of RD Recs: (POC, sticky note)

## 2020-11-27 NOTE — ASSESSMENT & PLAN NOTE
-- abnormal haptoglobin (<10), LDH(317), and reticulocytes (3) consistent with hemolytic anemia. 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   --expected with improve with cessation of alcohol  --treat symptoms  --non autoimmunie so steroid therapy is not warranted  --s/p one unit of PRBC on 11/25. Will give 2 additional units today given 6.3 hemologlobin  --will check unconjugated bilirubin and lipid panel as this is also elevated in Zieve Syndrome.   --no other signs of GI loss    11/27/2020  S/p 2 units PRBCs yesterday. Hemoglobin up to 8.3

## 2020-11-27 NOTE — ASSESSMENT & PLAN NOTE
-Admit to OBS  -CXR showed mild CHF/volume overload  -Echo performed 7/9/2020 showed an EF of 55% and diastolic dysfunction  -BNP elevated  -Diuresed in ED with x1 dose of IV Lasix  -Continue IV Lasix  -Daily weights  -Strict I&O's    11/27/2020  Responding well to diuretics. Repeat CXR today

## 2020-11-27 NOTE — ASSESSMENT & PLAN NOTE
11/25  -Patient has had decompensated alcohol cirrhosis with elevated meld score.  Her bilirubin is elevated but only slightly compared to baseline.  This elevation could be explained by her heart failure. We have been trying to get further evaluation of her liver and alisia hepatis based on abnormal ultrasound from July.   -monitor CMP, CBC and INR daily  -monitor for evidence of encephalopathy    11/27  -CT scan of the abdomen and pelvis with and without contrast liver protocol. Has not yet been completed.  -Please see yesterday's note. Patient admits to recent alcohol use.     MELD-Na score: 31 at 11/27/2020  5:12 AM  MELD score: 30 at 11/27/2020  5:12 AM  Calculated from:  Serum Creatinine: 2.1 mg/dL at 11/27/2020  5:12 AM  Serum Sodium: 134 mmol/L at 11/27/2020  5:12 AM  Total Bilirubin: 11.8 mg/dL at 11/27/2020  5:12 AM  INR(ratio): 1.9 at 11/25/2020  5:41 PM  Age: 44 years 10 months

## 2020-11-27 NOTE — ASSESSMENT & PLAN NOTE
MELD-Na score: 31 at 11/27/2020  5:12 AM  MELD score: 30 at 11/27/2020  5:12 AM  Calculated from:  Serum Creatinine: 2.1 mg/dL at 11/27/2020  5:12 AM  Serum Sodium: 134 mmol/L at 11/27/2020  5:12 AM  Total Bilirubin: 11.8 mg/dL at 11/27/2020  5:12 AM  INR(ratio): 1.9 at 11/25/2020  5:41 PM  Age: 44 years 10 months    GI input appreciated  Will order Triple phase CT once kidney function recovers

## 2020-11-27 NOTE — PLAN OF CARE
Patient had uneventful shift. Patient awake, alert and oriented x 4. VS stable. Patient denies pain or SOB. Patient on telemetry, NSR on the monitor. Patient ambulates with independently. Fall precautions in place, room free of clutter. Patient free from fall/injury. Plan of care reviewed with patient. Patient has no questions at this time. Will continue to monitor.

## 2020-11-27 NOTE — ASSESSMENT & PLAN NOTE
Consequence of cirrhosis and bone marrow suppression from EtOH  56 today. No need to transfuse    11/27/2020  46 today, no evidence of bleeding.

## 2020-11-27 NOTE — SUBJECTIVE & OBJECTIVE
Interval History: hemoglobin now up to 8.3 g/dL. Lower extremity swelling has improved.     Review of Systems   Constitutional: Positive for activity change. Negative for appetite change, chills and unexpected weight change.   HENT: Negative for congestion, mouth sores, sinus pressure, sore throat and trouble swallowing.    Eyes: Negative for photophobia, redness and visual disturbance.   Respiratory: Positive for shortness of breath. Negative for cough and wheezing.    Cardiovascular: Negative for chest pain, palpitations and leg swelling.   Gastrointestinal: Negative for abdominal distention, abdominal pain, blood in stool, constipation, diarrhea, nausea and vomiting.   Endocrine: Negative for polydipsia, polyphagia and polyuria.   Genitourinary: Negative for flank pain, frequency and hematuria.   Musculoskeletal: Negative for back pain and neck stiffness.   Skin: Negative for pallor and rash.   Allergic/Immunologic: Negative for immunocompromised state.   Neurological: Negative for dizziness, tremors, seizures, syncope, speech difficulty, weakness and headaches.   Hematological: Negative for adenopathy. Does not bruise/bleed easily.   Psychiatric/Behavioral: Negative for agitation, confusion and suicidal ideas.      Objective:     Vital Signs (Most Recent):  Temp: 98 °F (36.7 °C) (11/27/20 1150)  Pulse: 62 (11/27/20 1150)  Resp: 18 (11/27/20 1150)  BP: (!) 158/71 (11/27/20 1150)  SpO2: 98 % (11/27/20 1150) Vital Signs (24h Range):  Temp:  [96.8 °F (36 °C)-98.9 °F (37.2 °C)] 98 °F (36.7 °C)  Pulse:  [62-69] 62  Resp:  [18-22] 18  SpO2:  [94 %-98 %] 98 %  BP: (112-158)/(53-72) 158/71     Weight: 82.5 kg (181 lb 14.1 oz)  Body mass index is 29.36 kg/m².    Intake/Output Summary (Last 24 hours) at 11/27/2020 1302  Last data filed at 11/27/2020 0500  Gross per 24 hour   Intake 2246.67 ml   Output 400 ml   Net 1846.67 ml      Physical Exam  Vitals signs reviewed.   Constitutional:       General: She is not in acute  distress.     Appearance: She is well-developed.   HENT:      Head: Normocephalic and atraumatic.      Mouth/Throat:      Pharynx: No oropharyngeal exudate.   Eyes:      General: Scleral icterus present.         Right eye: No discharge.         Left eye: No discharge.      Pupils: Pupils are equal, round, and reactive to light.   Pulmonary:      Effort: Pulmonary effort is normal. No respiratory distress.      Breath sounds: No wheezing.      Comments: Decreased as bases  Abdominal:      General: There is distension (nontense).      Palpations: Abdomen is soft.      Tenderness: There is no abdominal tenderness.   Musculoskeletal:      Right lower leg: Edema present.      Left lower leg: Edema present.   Neurological:      Mental Status: She is alert and oriented to person, place, and time.   Psychiatric:         Behavior: Behavior normal.       Significant Labs:   CBC:   Recent Labs   Lab 11/25/20 1319 11/26/20  0845 11/27/20  0512 11/27/20  1058   WBC 6.00 6.42 5.35  --    HGB 6.1* 6.3* 7.6* 8.3*   HCT 18.5* 19.3* 22.1*  --    PLT 63* 56* 46*  --      CMP:   Recent Labs   Lab 11/25/20  1319 11/26/20  0845 11/27/20  0512   * 133* 134*   K 5.1 3.7 3.2*   CL 98 99 99   CO2 15* 19* 22*   * 127* 97   BUN 22* 33* 38*   CREATININE 1.1 1.7* 2.1*   CALCIUM 9.1 8.9 8.8   PROT 10.6* 9.1* 8.6*   ALBUMIN 2.8* 2.5* 2.4*   BILITOT 15.8* 18.0* 11.8*   ALKPHOS 192* 154* 165*   * 111* 95*   ALT 32 29 28   ANIONGAP 19* 15 13   EGFRNONAA >60 36* 28*     Troponin:   Recent Labs   Lab 11/25/20  1319 11/26/20  0845 11/27/20  0512   TROPONINI 0.068* 0.088* 0.056*       Significant Imaging: I have reviewed all pertinent imaging results/findings within the past 24 hours.

## 2020-11-27 NOTE — ASSESSMENT & PLAN NOTE
1.1>1.7. could be related to worsening anemia and CHF  --treat anemia and repeat BMP in AM    11/27/2020  Worsening renal function today. Up to 2.1. suspect from CHF. Will continue diuresis. Urine studies. Repeat today. If worsening, will consult Nephrology

## 2020-11-27 NOTE — PROGRESS NOTES
"  [facility]  Adult Nutrition  Progress Note    SUMMARY     Recommendations    Recommendation:   1. Add Novasource 2x/day   2. RD to f/u    Goals: Meet >75% EEN/EPN by RD f/u  Nutrition Goal Status: new  Communication of RD Recs: (POC, sticky note)    Reason for Assessment    Reason For Assessment: identified at risk by screening criteria  Diagnosis: (CHF exacerbation)  Relevant Medical History: cirrhosis, esophageal varices with bleeding, alcohol abuse, CKD    General Information Comments: Pt presented due to worsening CHF and was assessed by RD due to MST screen. Pt reports recent significant weight loss, but per epic records her weight has been stable. Discrepency may be related to fluid- will continue to monitor. Pt has a hx of alcohol abuse and admits to drinking alcohol recently. She said that she does have poor appetite at times due to depression. RD discussed strategies to maximize intake and pt understood and said that she does supplement with Ensure sometimes. NFPE- pt is well-nourished.     Nutrition Discharge Planning: renal diet    Nutrition Risk Screen    Nutrition Risk Screen: no indicators present    Nutrition/Diet History    Food Preferences: doesn't like milk   Supplemental Drinks or Food Habits: Ensure Plus  Food Allergies: NKFA  Factors Affecting Nutritional Intake: decreased appetite, excessive alcohol intake    Anthropometrics    Temp: 98.4 °F (36.9 °C)  Height Method: Stated  Height: 5' 6" (167.6 cm)  Height (inches): 66 in  Weight Method: Bed Scale  Weight: 82.5 kg (181 lb 14.1 oz)  Weight (lb): 181.88 lb  Ideal Body Weight (IBW), Female: 130 lb  % Ideal Body Weight, Female (lb): 137.11 %  BMI (Calculated): 29.4  BMI Grade: 25 - 29.9 - overweight       Lab/Procedures/Meds  Pertinent Labs: reviewed  AST 95, AlkPhos 165, Bilitot 11.8, Albumin 2.4, eGFR 33, Na 134  Pertinent Medications: reviewed    Physical Findings/Assessment         Estimated/Assessed Needs    Weight Used For " Calorie Calculations: 82.5 kg (181 lb 14.1 oz)  Energy Calorie Requirements (kcal): 1800- 2100  Energy Need Method: Cecil-St Jeor(x 1.2- 1.4)  Protein Requirements: 80- 100g(1-1.2g/kg per cirrhosis but CKD)  Weight Used For Protein Calculations: 82.5 kg (181 lb 14.1 oz)     Estimated Fluid Requirement Method: RDA Method  RDA Method (mL): 1800     Nutrition Prescription Ordered    Current Diet Order: low sodium    Evaluation of Received Nutrient/Fluid Intake       Intake/Output Summary (Last 24 hours) at 11/27/2020 1117  Last data filed at 11/27/2020 0500  Gross per 24 hour   Intake 2246.67 ml   Output 400 ml   Net 1846.67 ml     % Intake of Estimated Energy Needs: 50 - 75 %  % Meal Intake: 50 - 75 %    Nutrition Risk    1x week     Assessment and Plan    Nutrition Problem  Inadequate oral intake    Related to (etiology):   Decreased ability to consume sufficient energy    Signs and Symptoms (as evidenced by):   Change in appetite or taste    Interventions:  Medical food supplements- [facility]    Nutrition Diagnosis Status:   New    Monitor and Evaluation    Food and Nutrient Intake: food and beverage intake  Food and Nutrient Adminstration: diet order  Anthropometric Measurements: weight  Biochemical Data, Medical Tests and Procedures: electrolyte and renal panel, gastrointestinal profile, glucose/endocrine profile, inflammatory profile, lipid profile  Nutrition-Focused Physical Findings: overall appearance     Malnutrition Assessment                         Edema (Fluid Accumulation): 1-->trace   Subcutaneous Fat Loss (Final Summary): well nourished  Muscle Loss Evaluation (Final Summary): well nourished         Nutrition Follow-Up    RD Follow-up?: Yes    Thanks!  Danielle East MPH RD LDN

## 2020-11-27 NOTE — SUBJECTIVE & OBJECTIVE
Subjective:     Interval History:   No acute events overnight. Patient states she slept comfortably. Hemoglobin 6.3 yesterday - transfused 2 units on 11/25. Up to 7.6. Kidney function worsening. T bili decreased from 18 to 11.8. Denies any complaints at this time.    MELD-Na score: 31 at 11/27/2020  5:12 AM  MELD score: 30 at 11/27/2020  5:12 AM  Calculated from:  Serum Creatinine: 2.1 mg/dL at 11/27/2020  5:12 AM  Serum Sodium: 134 mmol/L at 11/27/2020  5:12 AM  Total Bilirubin: 11.8 mg/dL at 11/27/2020  5:12 AM  INR(ratio): 1.9 at 11/25/2020  5:41 PM  Age: 44 years 10 months    Review of Systems   Constitutional: Negative for activity change, appetite change, chills and diaphoresis.   Respiratory: Negative for cough and shortness of breath.    Cardiovascular: Negative for chest pain.   Gastrointestinal: Negative for abdominal distention, abdominal pain, blood in stool, constipation, diarrhea, nausea and vomiting.   Neurological: Negative for dizziness, weakness and light-headedness.   Psychiatric/Behavioral: Negative for dysphoric mood. The patient is not nervous/anxious.      Objective:     Vital Signs (Most Recent):  Temp: 98.4 °F (36.9 °C) (11/27/20 0826)  Pulse: 64 (11/27/20 0826)  Resp: 18 (11/27/20 0826)  BP: 131/62 (11/27/20 0826)  SpO2: 98 % (11/27/20 0826) Vital Signs (24h Range):  Temp:  [96.8 °F (36 °C)-98.9 °F (37.2 °C)] 98.4 °F (36.9 °C)  Pulse:  [64-71] 64  Resp:  [18-22] 18  SpO2:  [94 %-98 %] 98 %  BP: (112-134)/(53-72) 131/62     Weight: 82.5 kg (181 lb 14.1 oz) (11/27/20 0600)  Body mass index is 29.36 kg/m².      Intake/Output Summary (Last 24 hours) at 11/27/2020 1050  Last data filed at 11/27/2020 0500  Gross per 24 hour   Intake 2246.67 ml   Output 400 ml   Net 1846.67 ml       Lines/Drains/Airways     Peripheral Intravenous Line                 Peripheral IV - Single Lumen 11/25/20 20 G Left Hand 2 days         Peripheral IV - Single Lumen 11/25/20 1509 18 G Right Forearm 1 day                 Physical Exam  Constitutional:       General: She is not in acute distress.     Appearance: Normal appearance. She is obese. She is not ill-appearing, toxic-appearing or diaphoretic.   HENT:      Head: Normocephalic and atraumatic.   Eyes:      General: Scleral icterus present.      Extraocular Movements: Extraocular movements intact.   Neck:      Musculoskeletal: Normal range of motion.   Cardiovascular:      Rate and Rhythm: Normal rate and regular rhythm.   Pulmonary:      Effort: Pulmonary effort is normal. No respiratory distress.      Breath sounds: Normal breath sounds. No stridor. No wheezing.   Abdominal:      General: Bowel sounds are normal. There is no distension.      Palpations: Abdomen is soft. There is no mass.      Tenderness: There is no abdominal tenderness. There is no guarding.   Musculoskeletal: Normal range of motion.   Skin:     General: Skin is warm and dry.      Coloration: Skin is not pale.      Findings: No erythema.   Neurological:      General: No focal deficit present.      Mental Status: She is alert and oriented to person, place, and time.      Comments: No asterixis.   Psychiatric:         Mood and Affect: Mood normal.         Behavior: Behavior normal.         Significant Labs:  CBC:   Recent Labs   Lab 11/25/20  1319 11/26/20  0845 11/27/20  0512   WBC 6.00 6.42 5.35   HGB 6.1* 6.3* 7.6*   HCT 18.5* 19.3* 22.1*   PLT 63* 56* 46*     CMP:   Recent Labs   Lab 11/27/20  0512   GLU 97   CALCIUM 8.8   ALBUMIN 2.4*   PROT 8.6*   *   K 3.2*   CO2 22*   CL 99   BUN 38*   CREATININE 2.1*   ALKPHOS 165*   ALT 28   AST 95*   BILITOT 11.8*     Coagulation:   Recent Labs   Lab 11/25/20  1741   INR 1.9*         Significant Imaging:  Imaging results within the past 24 hours have been reviewed.

## 2020-11-27 NOTE — PROGRESS NOTES
[facility] Hospital Medicine  Progress Note    Patient Name: John Wray  MRN: [id number]  Patient Class: IP- Inpatient   Admission Date: 11/25/2020  Length of Stay: 2 days  Attending Physician: Yonas Red MD  Primary Care Provider: Primary Doctor No    Subjective:     Principal Problem:Alcoholic cirrhosis of liver with ascites        HPI:  Ms Wray is a 44 y.o. female patient with a PMHx of cirrhosis, HTN who presents to the ED endorsing for SOB which started last night. She states the SOB worsens when she lays flat but at her baseline is able to do so. Patient denies any fever, chills, n/v/d, blood in stool, CP, weakness, numbness, dizziness, headache, and all other sxs at this time. Pt reports non compliance and states that she did not take her BP medication this morning, and has not taken her Lasix in several days.   ED workup:  BNP 3105, BUN 22, Cr 1.1, Bilirubin 15.8, Alkaline Phosphatase 192, , UA, H/H: 6.1/18.5. CXR showed: mild congestive heart failure/volume overload. Worse since 07/12/2020. x1 unit of PRBC transfused for anemia. GI consulted due to concern of cirrhosis. Occult stool pending. She received IV hydralazine and Lasix 80mg IV x1 dose.  contacted for symptom management. Patient will admitted under  and placed under observation.   Patient is a Full Code and her SDM is her daughter Kalee Brooke: [phone]    Overview/Hospital Course:  11/25: Ms Wray is a 44 yo F with phmx of chirrosis, HTN, and CHF who presented to the ED endorsing SOB. Reports noncompliance with medications. Bilirubin, BNP, and AST elevated. GI consulted. CXR showed mild CHF and volume overload. Diuresed with Lasix 80mg IV in the ED. Admitted to  OBS for symptom management. As of 11/26/20 haptoglobin and reticulocytosis are most consistent with hemolytic anemia. 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 Will check direct and indirect bilirubin as well as lipid panel.  Triple phase CT is pending to further evaluate liver. Hemoglobin today 6.3 following one unit of blood (likely from hemolytic anemia). No obvious signs of GI blood loss. GI is following. Will transfuse 2 units PRBCs today. Gastoenterology service is following. Worsening renal failure likely due to worsening anemia. Will repeat BMP in AM following 2 units. Thrombocytopenia noted. No need to transfuse. Will continue to diurese and monitor response. As of 11/27/20, Hemoglobin is 8.3g/dL following 2 units. She has worsening kidney function today, likely from heart failure. Urine studies have been ordered. Continue diuresis for now. Will repeat CXR today.     Interval History: hemoglobin now up to 8.3 g/dL. Lower extremity swelling has improved.     Review of Systems   Constitutional: Positive for activity change. Negative for appetite change, chills and unexpected weight change.   HENT: Negative for congestion, mouth sores, sinus pressure, sore throat and trouble swallowing.    Eyes: Negative for photophobia, redness and visual disturbance.   Respiratory: Positive for shortness of breath. Negative for cough and wheezing.    Cardiovascular: Negative for chest pain, palpitations and leg swelling.   Gastrointestinal: Negative for abdominal distention, abdominal pain, blood in stool, constipation, diarrhea, nausea and vomiting.   Endocrine: Negative for polydipsia, polyphagia and polyuria.   Genitourinary: Negative for flank pain, frequency and hematuria.   Musculoskeletal: Negative for back pain and neck stiffness.   Skin: Negative for pallor and rash.   Allergic/Immunologic: Negative for immunocompromised state.   Neurological: Negative for dizziness, tremors, seizures, syncope, speech difficulty, weakness and headaches.   Hematological: Negative for adenopathy. Does not bruise/bleed easily.   Psychiatric/Behavioral: Negative for agitation, confusion and suicidal ideas.      Objective:     Vital Signs (Most  Recent):  Temp: 98 °F (36.7 °C) (11/27/20 1150)  Pulse: 62 (11/27/20 1150)  Resp: 18 (11/27/20 1150)  BP: (!) 158/71 (11/27/20 1150)  SpO2: 98 % (11/27/20 1150) Vital Signs (24h Range):  Temp:  [96.8 °F (36 °C)-98.9 °F (37.2 °C)] 98 °F (36.7 °C)  Pulse:  [62-69] 62  Resp:  [18-22] 18  SpO2:  [94 %-98 %] 98 %  BP: (112-158)/(53-72) 158/71     Weight: 82.5 kg (181 lb 14.1 oz)  Body mass index is 29.36 kg/m².    Intake/Output Summary (Last 24 hours) at 11/27/2020 1302  Last data filed at 11/27/2020 0500  Gross per 24 hour   Intake 2246.67 ml   Output 400 ml   Net 1846.67 ml      Physical Exam  Vitals signs reviewed.   Constitutional:       General: She is not in acute distress.     Appearance: She is well-developed.   HENT:      Head: Normocephalic and atraumatic.      Mouth/Throat:      Pharynx: No oropharyngeal exudate.   Eyes:      General: Scleral icterus present.         Right eye: No discharge.         Left eye: No discharge.      Pupils: Pupils are equal, round, and reactive to light.   Pulmonary:      Effort: Pulmonary effort is normal. No respiratory distress.      Breath sounds: No wheezing.      Comments: Decreased as bases  Abdominal:      General: There is distension (nontense).      Palpations: Abdomen is soft.      Tenderness: There is no abdominal tenderness.   Musculoskeletal:      Right lower leg: Edema present.      Left lower leg: Edema present.   Neurological:      Mental Status: She is alert and oriented to person, place, and time.   Psychiatric:         Behavior: Behavior normal.       Significant Labs:   CBC:   Recent Labs   Lab 11/25/20  1319 11/26/20  0845 11/27/20  0512 11/27/20  1058   WBC 6.00 6.42 5.35  --    HGB 6.1* 6.3* 7.6* 8.3*   HCT 18.5* 19.3* 22.1*  --    PLT 63* 56* 46*  --      CMP:   Recent Labs   Lab 11/25/20  1319 11/26/20  0845 11/27/20  0512   * 133* 134*   K 5.1 3.7 3.2*   CL 98 99 99   CO2 15* 19* 22*   * 127* 97   BUN 22* 33* 38*   CREATININE 1.1 1.7* 2.1*    CALCIUM 9.1 8.9 8.8   PROT 10.6* 9.1* 8.6*   ALBUMIN 2.8* 2.5* 2.4*   BILITOT 15.8* 18.0* 11.8*   ALKPHOS 192* 154* 165*   * 111* 95*   ALT 32 29 28   ANIONGAP 19* 15 13   EGFRNONAA >60 36* 28*     Troponin:   Recent Labs   Lab 11/25/20  1319 11/26/20  0845 11/27/20  0512   TROPONINI 0.068* 0.088* 0.056*       Significant Imaging: I have reviewed all pertinent imaging results/findings within the past 24 hours.      Assessment/Plan:      * Alcoholic cirrhosis of liver with ascites  MELD-Na score: 31 at 11/27/2020  5:12 AM  MELD score: 30 at 11/27/2020  5:12 AM  Calculated from:  Serum Creatinine: 2.1 mg/dL at 11/27/2020  5:12 AM  Serum Sodium: 134 mmol/L at 11/27/2020  5:12 AM  Total Bilirubin: 11.8 mg/dL at 11/27/2020  5:12 AM  INR(ratio): 1.9 at 11/25/2020  5:41 PM  Age: 44 years 10 months    GI input appreciated  Will order Triple phase CT once kidney function recovers    Thrombocytopenia  Consequence of cirrhosis and bone marrow suppression from EtOH  56 today. No need to transfuse    11/27/2020  46 today, no evidence of bleeding.     Elevated troponin  --suspect demand ischemia from CHF and marked anemia  --trend troponin levels  --repeat Echocardiogram once euvolemic    11/27/2020  Downward trend.   Repeat Echocardiogram tomorrow      Renal injury  1.1>1.7. could be related to worsening anemia and CHF  --treat anemia and repeat BMP in AM    11/27/2020  Worsening renal function today. Up to 2.1. suspect from CHF. Will continue diuresis. Urine studies. Repeat today. If worsening, will consult Nephrology      Anemia  -- abnormal haptoglobin (<10), LDH(317), and reticulocytes (3) consistent with hemolytic anemia. 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   --expected with improve with cessation of alcohol  --treat symptoms  --non autoimmunie so steroid therapy is not warranted  --s/p one unit of PRBC on 11/25. Will give 2 additional units today given 6.3 hemologlobin  --will check unconjugated bilirubin  and lipid panel as this is also elevated in Zieve Syndrome.   --no other signs of GI loss    11/27/2020  S/p 2 units PRBCs yesterday. Hemoglobin up to 8.3      Elevated bilirubin  Patient has a history of cirrhosis   - Total Bilirubin 15.8  - GI consulted  - Will follow GI recs    11/26/2020  Triple phase CT pending  Will check direct and indirect bilirubin    11/27/2020  Bilirubin is trending down      Acute exacerbation of CHF (congestive heart failure)  -BNP 3105  -Diuresed in ED with IV lasix  -Continue IV lasix  -Daily weights  -Strict I&Os    11/27/2020  Repeat BNP today. Continue Lasix 40mg IV. May need additional dose today. Follow up CXR.   Repeat Echocardiogram tomorrow    Acute pulmonary edema  -Admit to OBS  -CXR showed mild CHF/volume overload  -Echo performed 7/9/2020 showed an EF of 55% and diastolic dysfunction  -BNP elevated  -Diuresed in ED with x1 dose of IV Lasix  -Continue IV Lasix  -Daily weights  -Strict I&O's    11/27/2020  Responding well to diuretics. Repeat CXR today      VTE Risk Mitigation (From admission, onward)         Ordered     IP VTE HIGH RISK PATIENT  Once      11/25/20 1608     Place sequential compression device  Until discontinued      11/25/20 1608                Discharge Planning   FINN:      Code Status: Full Code   Is the patient medically ready for discharge?:     Reason for patient still in hospital (select all that apply): Treatment  Discharge Plan A: Home with family      Jacob Campos NP  Department of Hospital Medicine   [facility]

## 2020-11-28 LAB
ALBUMIN SERPL BCP-MCNC: 2.4 G/DL (ref 3.5–5.2)
ALP SERPL-CCNC: 205 U/L (ref 55–135)
ALT SERPL W/O P-5'-P-CCNC: 30 U/L (ref 10–44)
ANION GAP SERPL CALC-SCNC: 12 MMOL/L (ref 8–16)
ANION GAP SERPL CALC-SCNC: 14 MMOL/L (ref 8–16)
AST SERPL-CCNC: 94 U/L (ref 10–40)
BASOPHILS # BLD AUTO: 0.04 K/UL (ref 0–0.2)
BASOPHILS NFR BLD: 0.8 % (ref 0–1.9)
BILIRUB DIRECT SERPL-MCNC: 5.6 MG/DL (ref 0.1–0.3)
BILIRUB SERPL-MCNC: 8.7 MG/DL (ref 0.1–1)
BLD PROD TYP BPU: NORMAL
BLOOD UNIT EXPIRATION DATE: NORMAL
BLOOD UNIT TYPE CODE: 7300
BLOOD UNIT TYPE: NORMAL
BNP SERPL-MCNC: 1614 PG/ML (ref 0–99)
BUN SERPL-MCNC: 37 MG/DL (ref 6–20)
BUN SERPL-MCNC: 40 MG/DL (ref 6–20)
CALCIUM SERPL-MCNC: 8.9 MG/DL (ref 8.7–10.5)
CALCIUM SERPL-MCNC: 9 MG/DL (ref 8.7–10.5)
CHLORIDE SERPL-SCNC: 101 MMOL/L (ref 95–110)
CHLORIDE SERPL-SCNC: 103 MMOL/L (ref 95–110)
CO2 SERPL-SCNC: 21 MMOL/L (ref 23–29)
CO2 SERPL-SCNC: 23 MMOL/L (ref 23–29)
CODING SYSTEM: NORMAL
CREAT SERPL-MCNC: 1.8 MG/DL (ref 0.5–1.4)
CREAT SERPL-MCNC: 1.9 MG/DL (ref 0.5–1.4)
DIFFERENTIAL METHOD: ABNORMAL
DISPENSE STATUS: NORMAL
EOSINOPHIL # BLD AUTO: 0.2 K/UL (ref 0–0.5)
EOSINOPHIL NFR BLD: 4.9 % (ref 0–8)
ERYTHROCYTE [DISTWIDTH] IN BLOOD BY AUTOMATED COUNT: 17.7 % (ref 11.5–14.5)
EST. GFR  (AFRICAN AMERICAN): 37 ML/MIN/1.73 M^2
EST. GFR  (AFRICAN AMERICAN): 39 ML/MIN/1.73 M^2
EST. GFR  (NON AFRICAN AMERICAN): 32 ML/MIN/1.73 M^2
EST. GFR  (NON AFRICAN AMERICAN): 34 ML/MIN/1.73 M^2
GLUCOSE SERPL-MCNC: 109 MG/DL (ref 70–110)
GLUCOSE SERPL-MCNC: 99 MG/DL (ref 70–110)
HCT VFR BLD AUTO: 22.2 % (ref 37–48.5)
HGB BLD-MCNC: 7.7 G/DL (ref 12–16)
IMM GRANULOCYTES # BLD AUTO: 0.02 K/UL (ref 0–0.04)
IMM GRANULOCYTES NFR BLD AUTO: 0.4 % (ref 0–0.5)
LYMPHOCYTES # BLD AUTO: 0.8 K/UL (ref 1–4.8)
LYMPHOCYTES NFR BLD: 16 % (ref 18–48)
MCH RBC QN AUTO: 32.1 PG (ref 27–31)
MCHC RBC AUTO-ENTMCNC: 34.7 G/DL (ref 32–36)
MCV RBC AUTO: 93 FL (ref 82–98)
MONOCYTES # BLD AUTO: 0.6 K/UL (ref 0.3–1)
MONOCYTES NFR BLD: 12.3 % (ref 4–15)
NEUTROPHILS # BLD AUTO: 3.2 K/UL (ref 1.8–7.7)
NEUTROPHILS NFR BLD: 65.6 % (ref 38–73)
NRBC BLD-RTO: 0 /100 WBC
NUM UNITS TRANS PACKED RBC: NORMAL
PLATELET # BLD AUTO: 45 K/UL (ref 150–350)
PMV BLD AUTO: 10.9 FL (ref 9.2–12.9)
POTASSIUM SERPL-SCNC: 3.5 MMOL/L (ref 3.5–5.1)
POTASSIUM SERPL-SCNC: 3.5 MMOL/L (ref 3.5–5.1)
PROT SERPL-MCNC: 8.9 G/DL (ref 6–8.4)
RBC # BLD AUTO: 2.4 M/UL (ref 4–5.4)
SODIUM SERPL-SCNC: 136 MMOL/L (ref 136–145)
SODIUM SERPL-SCNC: 138 MMOL/L (ref 136–145)
WBC # BLD AUTO: 4.89 K/UL (ref 3.9–12.7)

## 2020-11-28 PROCEDURE — 80053 COMPREHEN METABOLIC PANEL: CPT

## 2020-11-28 PROCEDURE — 25000003 PHARM REV CODE 250: Performed by: NURSE PRACTITIONER

## 2020-11-28 PROCEDURE — S4991 NICOTINE PATCH NONLEGEND: HCPCS | Performed by: NURSE PRACTITIONER

## 2020-11-28 PROCEDURE — 85025 COMPLETE CBC W/AUTO DIFF WBC: CPT

## 2020-11-28 PROCEDURE — 99232 SBSQ HOSP IP/OBS MODERATE 35: CPT | Mod: ,,, | Performed by: INTERNAL MEDICINE

## 2020-11-28 PROCEDURE — 80048 BASIC METABOLIC PNL TOTAL CA: CPT

## 2020-11-28 PROCEDURE — 21400001 HC TELEMETRY ROOM

## 2020-11-28 PROCEDURE — 82248 BILIRUBIN DIRECT: CPT

## 2020-11-28 PROCEDURE — P9016 RBC LEUKOCYTES REDUCED: HCPCS

## 2020-11-28 PROCEDURE — P9047 ALBUMIN (HUMAN), 25%, 50ML: HCPCS | Mod: JG | Performed by: INTERNAL MEDICINE

## 2020-11-28 PROCEDURE — 83880 ASSAY OF NATRIURETIC PEPTIDE: CPT

## 2020-11-28 PROCEDURE — 36415 COLL VENOUS BLD VENIPUNCTURE: CPT

## 2020-11-28 PROCEDURE — 94761 N-INVAS EAR/PLS OXIMETRY MLT: CPT

## 2020-11-28 PROCEDURE — 99232 PR SUBSEQUENT HOSPITAL CARE,LEVL II: ICD-10-PCS | Mod: ,,, | Performed by: INTERNAL MEDICINE

## 2020-11-28 PROCEDURE — 63600175 PHARM REV CODE 636 W HCPCS: Mod: JG | Performed by: INTERNAL MEDICINE

## 2020-11-28 RX ORDER — HYDROCODONE BITARTRATE AND ACETAMINOPHEN 500; 5 MG/1; MG/1
TABLET ORAL
Status: DISCONTINUED | OUTPATIENT
Start: 2020-11-28 | End: 2020-11-29 | Stop reason: HOSPADM

## 2020-11-28 RX ORDER — ALBUMIN HUMAN 250 G/1000ML
50 SOLUTION INTRAVENOUS ONCE
Status: COMPLETED | OUTPATIENT
Start: 2020-11-28 | End: 2020-11-28

## 2020-11-28 RX ADMIN — SERTRALINE HYDROCHLORIDE 25 MG: 25 TABLET ORAL at 08:11

## 2020-11-28 RX ADMIN — PROPRANOLOL HYDROCHLORIDE 20 MG: 20 TABLET ORAL at 08:11

## 2020-11-28 RX ADMIN — ALPRAZOLAM 1 MG: 1 TABLET ORAL at 09:11

## 2020-11-28 RX ADMIN — ALBUMIN (HUMAN) 50 G: 0.25 INJECTION, SOLUTION INTRAVENOUS at 04:11

## 2020-11-28 RX ADMIN — Medication 100 MG: at 08:11

## 2020-11-28 RX ADMIN — Medication 6 MG: at 09:11

## 2020-11-28 RX ADMIN — FOLIC ACID 1000 MCG: 1 TABLET ORAL at 08:11

## 2020-11-28 RX ADMIN — PROPRANOLOL HYDROCHLORIDE 20 MG: 20 TABLET ORAL at 09:11

## 2020-11-28 RX ADMIN — Medication 1 PATCH: at 08:11

## 2020-11-28 NOTE — ASSESSMENT & PLAN NOTE
1.1>1.7. could be related to worsening anemia and CHF  --treat anemia and repeat BMP in AM    11/27/2020  Worsening renal function today. Up to 2.1. suspect from CHF. Will continue diuresis. Urine studies. Repeat today. If worsening, will consult Nephrology    11/28/2020  Trending down after holding diuresis. Repeat in AM

## 2020-11-28 NOTE — ASSESSMENT & PLAN NOTE
Patient has a history of cirrhosis   - Total Bilirubin 15.8  - GI consulted  - Will follow GI recs    11/26/2020  Triple phase CT pending  Will check direct and indirect bilirubin    11/27/2020  Bilirubin is trending down    11/28/2020  Total bilirubin continues to trend down. Direct bilirubin only 5 in setting of total bilirubin of 8. Patient hemolysis is likely improving.

## 2020-11-28 NOTE — ASSESSMENT & PLAN NOTE
-BNP 3105  -Diuresed in ED with IV lasix  -Continue IV lasix  -Daily weights  -Strict I&Os    11/27/2020  Repeat BNP today. Continue Lasix 40mg IV. May need additional dose today. Follow up CXR.   Repeat Echocardiogram tomorrow    11/28/2020  Continue to hold Lasix today. Give Albumin to assist with fluid shift. May have to resume Lasix in AM.

## 2020-11-28 NOTE — PROGRESS NOTES
[facility]  Gastroenterology  Progress Note    Patient Name: John Wray  MRN: [id number]  Admission Date: 11/25/2020  Hospital Length of Stay: 3 days  Code Status: Full Code   Attending Provider: Yonas Red MD  Consulting Provider: Rosanna Pablo MD  Primary Care Physician: Primary Doctor No  Principal Problem: Alcoholic cirrhosis of liver with ascites    Subjective:     Interval History: sitting up in bed, on her cell phone. No complaints. Feels leg swelling has improved. Tolerating PO    Review of Systems  Objective:     Vital Signs (Most Recent):  Temp: 98.5 °F (36.9 °C) (11/28/20 1132)  Pulse: 63 (11/28/20 1132)  Resp: 18 (11/28/20 1132)  BP: (!) 149/67 (11/28/20 1132)  SpO2: 98 % (11/28/20 1132) Vital Signs (24h Range):  Temp:  [96.4 °F (35.8 °C)-98.9 °F (37.2 °C)] 98.5 °F (36.9 °C)  Pulse:  [63-83] 63  Resp:  [18-20] 18  SpO2:  [93 %-98 %] 98 %  BP: (127-157)/(57-76) 149/67     Weight: 82.1 kg (181 lb) (11/28/20 0431)  Body mass index is 29.21 kg/m².      Intake/Output Summary (Last 24 hours) at 11/28/2020 1330  Last data filed at 11/28/2020 0500  Gross per 24 hour   Intake 1314 ml   Output --   Net 1314 ml       Lines/Drains/Airways     Peripheral Intravenous Line                 Peripheral IV - Single Lumen 11/25/20 20 G Left Hand 3 days         Peripheral IV - Single Lumen 11/25/20 1509 18 G Right Forearm 2 days                Physical Exam  Vitals signs and nursing note reviewed.   Abdominal:      General: Abdomen is protuberant. Bowel sounds are normal.      Palpations: Abdomen is soft.      Tenderness: There is no abdominal tenderness. There is no guarding or rebound.   Neurological:      General: No focal deficit present.      Mental Status: She is alert and oriented to person, place, and time. Mental status is at baseline.   Psychiatric:         Attention and Perception: Attention normal.         Mood and Affect: Mood normal.         Speech: Speech normal.          Behavior: Behavior is cooperative.         Thought Content: Thought content normal.         Cognition and Memory: Cognition normal.         Judgment: Judgment normal.         Significant Labs:  CBC:   Recent Labs   Lab 11/27/20  0512 11/27/20  1058 11/27/20  1502 11/28/20  0639   WBC 5.35  --   --  4.89   HGB 7.6* 8.3*  --  7.7*   HCT 22.1*  --  22.7* 22.2*   PLT 46*  --   --  45*     CMP:   Recent Labs   Lab 11/28/20  0639   GLU 99   CALCIUM 8.9   ALBUMIN 2.4*   PROT 8.9*      K 3.5   CO2 21*      BUN 40*   CREATININE 1.9*   ALKPHOS 205*   ALT 30   AST 94*   BILITOT 8.7*     Coagulation: No results for input(s): PT, INR, APTT in the last 48 hours.      Significant Imaging:  Imaging results within the past 24 hours have been reviewed.    Assessment/Plan:     Active Diagnoses:    Diagnosis Date Noted POA    PRINCIPAL PROBLEM:  Alcoholic cirrhosis of liver with ascites [K70.31] 11/25/2020 Yes    Renal injury [S37.009A] 11/26/2020 No    Elevated troponin [R77.8] 11/26/2020 Yes    Thrombocytopenia [D69.6] 11/26/2020 Yes    Acute pulmonary edema [J81.0] 11/25/2020 Yes    Acute exacerbation of CHF (congestive heart failure) [I50.9] 11/25/2020 Yes    Elevated bilirubin [R17] 11/25/2020 Yes    Anemia [D64.9] 11/25/2020 Yes      Problems Resolved During this Admission:       A: 44 y.o female with acute CHF and decompensated alcohol cirrhosis with ascites complicated by non-immune hemolytic anemia    1. Alcohol cirrhosis of liver with ascites  - MELD labs pending  - decompensated  - not OLTx candidate due to active alcohol use    2. Anemia  - transfusion dependent  - 4th unit today since admission  - suspect non-immune hemolytic anemia  - low concern for GI bleed, continue beta-blocker  - goal to keep Hgb 8    3. Acute CHF  - primary issue for this admission  - diuresis limited by renal function  - was on Lasix 40mg IV BID until rise in creatinine  - crea 1.9 today  - unable to obtain echo until she is  euvolemic      Recommendations:  1. Consider heme input. Patient is on her 4th unit of blood since admission  2. Albumin to support renal function and mobilize fluid  3. Coomb's test today      Communicated with Casper VASQUEZ, [facility] [place] [facility]. Patient is desiring to go home but understands her anemia, heart failure and now renal function will cause her to return. At this point we need assitance for heme given she appears to be dependent on transfusion every other day.  I will follow-up with patient. Please contact us if you have any additional questions.       Rosanna Pablo MD  Gastroenterology  [facility]

## 2020-11-28 NOTE — ASSESSMENT & PLAN NOTE
MELD-Na score: 31 at 11/27/2020  5:12 AM  MELD score: 30 at 11/27/2020  5:12 AM  Calculated from:  Serum Creatinine: 2.1 mg/dL at 11/27/2020  5:12 AM  Serum Sodium: 134 mmol/L at 11/27/2020  5:12 AM  Total Bilirubin: 11.8 mg/dL at 11/27/2020  5:12 AM  INR(ratio): 1.9 at 11/25/2020  5:41 PM  Age: 44 years 10 months    GI input appreciated  Will order Triple phase CT once kidney function recovers    11/28/2020  Albumin added for kidney support and to assist with mobility

## 2020-11-28 NOTE — ASSESSMENT & PLAN NOTE
Consequence of cirrhosis and bone marrow suppression from EtOH  56 today. No need to transfuse    11/27/2020  46 today, no evidence of bleeding.     11/28/2020  Platelets down to 45k. No need to transfuse unless <15k or active bleeding

## 2020-11-28 NOTE — SUBJECTIVE & OBJECTIVE
Interval History: CXR yesterday shows some improvement. BNP trending down.  Hemoglobin is 7.7 today, no obvious signs of bleeding. Total bilirubin trending down.    Review of Systems   Constitutional: Positive for activity change. Negative for appetite change, chills and unexpected weight change.   HENT: Negative for congestion, mouth sores, sinus pressure, sore throat and trouble swallowing.    Eyes: Negative for photophobia, redness and visual disturbance.   Respiratory: Positive for shortness of breath. Negative for cough and wheezing.    Cardiovascular: Negative for chest pain, palpitations and leg swelling.   Gastrointestinal: Negative for abdominal distention, abdominal pain, blood in stool, constipation, diarrhea, nausea and vomiting.   Endocrine: Negative for polydipsia, polyphagia and polyuria.   Genitourinary: Negative for flank pain, frequency and hematuria.   Musculoskeletal: Negative for back pain and neck stiffness.   Skin: Negative for pallor and rash.   Allergic/Immunologic: Negative for immunocompromised state.   Neurological: Negative for dizziness, tremors, seizures, syncope, speech difficulty, weakness and headaches.   Hematological: Negative for adenopathy. Does not bruise/bleed easily.   Psychiatric/Behavioral: Negative for agitation, confusion and suicidal ideas.      Objective:     Vital Signs (Most Recent):  Temp: 98.5 °F (36.9 °C) (11/28/20 1132)  Pulse: 63 (11/28/20 1132)  Resp: 18 (11/28/20 1132)  BP: (!) 149/67 (11/28/20 1132)  SpO2: 98 % (11/28/20 1132) Vital Signs (24h Range):  Temp:  [96.4 °F (35.8 °C)-98.9 °F (37.2 °C)] 98.5 °F (36.9 °C)  Pulse:  [63-83] 63  Resp:  [18-20] 18  SpO2:  [93 %-98 %] 98 %  BP: (127-157)/(57-76) 149/67     Weight: 82.1 kg (181 lb)  Body mass index is 29.21 kg/m².    Intake/Output Summary (Last 24 hours) at 11/28/2020 1421  Last data filed at 11/28/2020 0500  Gross per 24 hour   Intake 1314 ml   Output --   Net 1314 ml      Physical Exam  Vitals signs  reviewed.   Constitutional:       General: She is not in acute distress.     Appearance: She is well-developed.   HENT:      Head: Normocephalic and atraumatic.      Mouth/Throat:      Pharynx: No oropharyngeal exudate.   Eyes:      General: Scleral icterus present.         Right eye: No discharge.         Left eye: No discharge.      Pupils: Pupils are equal, round, and reactive to light.   Pulmonary:      Effort: Pulmonary effort is normal. No respiratory distress.      Breath sounds: No wheezing.      Comments: Decreased as bases  Abdominal:      General: There is distension (nontense).      Palpations: Abdomen is soft.      Tenderness: There is no abdominal tenderness.   Musculoskeletal:      Right lower leg: No edema.      Left lower leg: No edema.   Neurological:      Mental Status: She is alert and oriented to person, place, and time.   Psychiatric:         Behavior: Behavior normal.         Significant Labs:   CBC:   Recent Labs   Lab 11/27/20  0512 11/27/20  1058 11/27/20  1502 11/28/20  0639   WBC 5.35  --   --  4.89   HGB 7.6* 8.3*  --  7.7*   HCT 22.1*  --  22.7* 22.2*   PLT 46*  --   --  45*     CMP:   Recent Labs   Lab 11/27/20  0512 11/28/20  0639   * 136   K 3.2* 3.5   CL 99 103   CO2 22* 21*   GLU 97 99   BUN 38* 40*   CREATININE 2.1* 1.9*   CALCIUM 8.8 8.9   PROT 8.6* 8.9*   ALBUMIN 2.4* 2.4*   BILITOT 11.8* 8.7*   ALKPHOS 165* 205*   AST 95* 94*   ALT 28 30   ANIONGAP 13 12   EGFRNONAA 28* 32*       Significant Imaging:  Narrative & Impression     EXAMINATION:  XR CHEST AP PORTABLE     CLINICAL HISTORY:  follow up CHF;     FINDINGS:  Single view of the chest.  Comparison 11/25/2020     Cardiac silhouette is enlarged but stable.  Subtle coarse interstitial opacities within the lung bases could reflect early edema or interstitial pneumonia.  Coil ball is seen within the epigastric region.No evidence of pleural effusion or pneumothorax.  Bones demonstrate degenerative  changes.     Impression:     Findings demonstrates slightly improved aeration at the lung bases.

## 2020-11-28 NOTE — ASSESSMENT & PLAN NOTE
-Admit to OBS  -CXR showed mild CHF/volume overload  -Echo performed 7/9/2020 showed an EF of 55% and diastolic dysfunction  -BNP elevated  -Diuresed in ED with x1 dose of IV Lasix  -Continue IV Lasix  -Daily weights  -Strict I&O's    11/27/2020  Responding well to diuretics. Repeat CXR today    11/28/2020  CXR shows improvement today

## 2020-11-28 NOTE — PROGRESS NOTES
[facility] Hospital Medicine  Progress Note    Patient Name: John Wray  MRN: [id number]  Patient Class: IP- Inpatient   Admission Date: 11/25/2020  Length of Stay: 3 days  Attending Physician: Yonas Red MD  Primary Care Provider: Primary Doctor No    Subjective:     Principal Problem:Alcoholic cirrhosis of liver with ascites        HPI:  Ms Wray is a 44 y.o. female patient with a PMHx of cirrhosis, HTN who presents to the ED endorsing for SOB which started last night. She states the SOB worsens when she lays flat but at her baseline is able to do so. Patient denies any fever, chills, n/v/d, blood in stool, CP, weakness, numbness, dizziness, headache, and all other sxs at this time. Pt reports non compliance and states that she did not take her BP medication this morning, and has not taken her Lasix in several days.   ED workup:  BNP 3105, BUN 22, Cr 1.1, Bilirubin 15.8, Alkaline Phosphatase 192, , UA, H/H: 6.1/18.5. CXR showed: mild congestive heart failure/volume overload. Worse since 07/12/2020. x1 unit of PRBC transfused for anemia. GI consulted due to concern of cirrhosis. Occult stool pending. She received IV hydralazine and Lasix 80mg IV x1 dose.  contacted for symptom management. Patient will admitted under  and placed under observation.   Patient is a Full Code and her SDM is her daughter Kalee Brooke: [phone]    Overview/Hospital Course:  11/25: Ms Wray is a 42 yo F with phmx of chirrosis, HTN, and CHF who presented to the ED endorsing SOB. Reports noncompliance with medications. Bilirubin, BNP, and AST elevated. GI consulted. CXR showed mild CHF and volume overload. Diuresed with Lasix 80mg IV in the ED. Admitted to  OBS for symptom management.     As of 11/26/20 haptoglobin and reticulocytosis are most consistent with hemolytic anemia. 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 Will check direct and indirect bilirubin as well as lipid  panel. Triple phase CT is pending to further evaluate liver. Hemoglobin today 6.3 following one unit of blood (likely from hemolytic anemia). No obvious signs of GI blood loss. GI is following. Will transfuse 2 units PRBCs today. Gastoenterology service is following. Worsening renal failure likely due to worsening anemia. Will repeat BMP in AM following 2 units. Thrombocytopenia noted. No need to transfuse. Will continue to diurese and monitor response.     As of 11/27/20, Hemoglobin is 8.3g/dL following 2 units. She has worsening kidney function today, likely from heart failure. Urine studies have been ordered. Continue diuresis for now. Will repeat CXR today.     11/28/2020, CXR shows improvement in heart failure. BNP trending down. Hemoglobin 7.7 today. Will transfuse one unit to keep Hgb>8. Anemia and thrombocytopenia discussed with Dr. Palumbo. 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 Direct and indirect bilirubin are pending. No other management for Zieve syndrome besides transfusion and alcohol cessation. No signs of bleeding. No abnormal      Interval History: CXR yesterday shows some improvement. BNP trending down.  Hemoglobin is 7.7 today, no obvious signs of bleeding. Total bilirubin trending down.    Review of Systems   Constitutional: Positive for activity change. Negative for appetite change, chills and unexpected weight change.   HENT: Negative for congestion, mouth sores, sinus pressure, sore throat and trouble swallowing.    Eyes: Negative for photophobia, redness and visual disturbance.   Respiratory: Positive for shortness of breath. Negative for cough and wheezing.    Cardiovascular: Negative for chest pain, palpitations and leg swelling.   Gastrointestinal: Negative for abdominal distention, abdominal pain, blood in stool, constipation, diarrhea, nausea and vomiting.   Endocrine: Negative for polydipsia, polyphagia and polyuria.   Genitourinary: Negative for flank  pain, frequency and hematuria.   Musculoskeletal: Negative for back pain and neck stiffness.   Skin: Negative for pallor and rash.   Allergic/Immunologic: Negative for immunocompromised state.   Neurological: Negative for dizziness, tremors, seizures, syncope, speech difficulty, weakness and headaches.   Hematological: Negative for adenopathy. Does not bruise/bleed easily.   Psychiatric/Behavioral: Negative for agitation, confusion and suicidal ideas.      Objective:     Vital Signs (Most Recent):  Temp: 98.5 °F (36.9 °C) (11/28/20 1132)  Pulse: 63 (11/28/20 1132)  Resp: 18 (11/28/20 1132)  BP: (!) 149/67 (11/28/20 1132)  SpO2: 98 % (11/28/20 1132) Vital Signs (24h Range):  Temp:  [96.4 °F (35.8 °C)-98.9 °F (37.2 °C)] 98.5 °F (36.9 °C)  Pulse:  [63-83] 63  Resp:  [18-20] 18  SpO2:  [93 %-98 %] 98 %  BP: (127-157)/(57-76) 149/67     Weight: 82.1 kg (181 lb)  Body mass index is 29.21 kg/m².    Intake/Output Summary (Last 24 hours) at 11/28/2020 1421  Last data filed at 11/28/2020 0500  Gross per 24 hour   Intake 1314 ml   Output --   Net 1314 ml      Physical Exam  Vitals signs reviewed.   Constitutional:       General: She is not in acute distress.     Appearance: She is well-developed.   HENT:      Head: Normocephalic and atraumatic.      Mouth/Throat:      Pharynx: No oropharyngeal exudate.   Eyes:      General: Scleral icterus present.         Right eye: No discharge.         Left eye: No discharge.      Pupils: Pupils are equal, round, and reactive to light.   Pulmonary:      Effort: Pulmonary effort is normal. No respiratory distress.      Breath sounds: No wheezing.      Comments: Decreased as bases  Abdominal:      General: There is distension (nontense).      Palpations: Abdomen is soft.      Tenderness: There is no abdominal tenderness.   Musculoskeletal:      Right lower leg: No edema.      Left lower leg: No edema.   Neurological:      Mental Status: She is alert and oriented to person, place, and time.    Psychiatric:         Behavior: Behavior normal.         Significant Labs:   CBC:   Recent Labs   Lab 11/27/20  0512 11/27/20  1058 11/27/20  1502 11/28/20  0639   WBC 5.35  --   --  4.89   HGB 7.6* 8.3*  --  7.7*   HCT 22.1*  --  22.7* 22.2*   PLT 46*  --   --  45*     CMP:   Recent Labs   Lab 11/27/20  0512 11/28/20  0639   * 136   K 3.2* 3.5   CL 99 103   CO2 22* 21*   GLU 97 99   BUN 38* 40*   CREATININE 2.1* 1.9*   CALCIUM 8.8 8.9   PROT 8.6* 8.9*   ALBUMIN 2.4* 2.4*   BILITOT 11.8* 8.7*   ALKPHOS 165* 205*   AST 95* 94*   ALT 28 30   ANIONGAP 13 12   EGFRNONAA 28* 32*       Significant Imaging:  Narrative & Impression     EXAMINATION:  XR CHEST AP PORTABLE     CLINICAL HISTORY:  follow up CHF;     FINDINGS:  Single view of the chest.  Comparison 11/25/2020     Cardiac silhouette is enlarged but stable.  Subtle coarse interstitial opacities within the lung bases could reflect early edema or interstitial pneumonia.  Coil ball is seen within the epigastric region.No evidence of pleural effusion or pneumothorax.  Bones demonstrate degenerative changes.     Impression:     Findings demonstrates slightly improved aeration at the lung bases.                 Assessment/Plan:      * Alcoholic cirrhosis of liver with ascites  MELD-Na score: 31 at 11/27/2020  5:12 AM  MELD score: 30 at 11/27/2020  5:12 AM  Calculated from:  Serum Creatinine: 2.1 mg/dL at 11/27/2020  5:12 AM  Serum Sodium: 134 mmol/L at 11/27/2020  5:12 AM  Total Bilirubin: 11.8 mg/dL at 11/27/2020  5:12 AM  INR(ratio): 1.9 at 11/25/2020  5:41 PM  Age: 44 years 10 months    GI input appreciated  Will order Triple phase CT once kidney function recovers    11/28/2020  Albumin added for kidney support and to assist with mobility    Thrombocytopenia  Consequence of cirrhosis and bone marrow suppression from EtOH  56 today. No need to transfuse    11/27/2020  46 today, no evidence of bleeding.     11/28/2020  Platelets down to 45k. No need to transfuse  unless <15k or active bleeding    Elevated troponin  --suspect demand ischemia from CHF and marked anemia  --trend troponin levels  --repeat Echocardiogram once euvolemic    11/27/2020  Downward trend.   Repeat Echocardiogram tomorrow      Renal injury  1.1>1.7. could be related to worsening anemia and CHF  --treat anemia and repeat BMP in AM    11/27/2020  Worsening renal function today. Up to 2.1. suspect from CHF. Will continue diuresis. Urine studies. Repeat today. If worsening, will consult Nephrology    11/28/2020  Trending down after holding diuresis. Repeat in AM    Anemia  -- abnormal haptoglobin (<10), LDH(317), and reticulocytes (3) consistent with hemolytic anemia. 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   --expected with improve with cessation of alcohol  --treat symptoms  --non autoimmunie so steroid therapy is not warranted  --s/p one unit of PRBC on 11/25. Will give 2 additional units today given 6.3 hemologlobin  --will check unconjugated bilirubin and lipid panel as this is also elevated in Zieve Syndrome.   --no other signs of GI loss    11/27/2020  S/p 2 units PRBCs yesterday. Hemoglobin up to 8.3    11/28/2020  Unable to obtain indirect/direct bilirubin during setting of active hemolysis. Today's direct bilirubin down to 5, but total bilirubin has significantly improved to 8 today. No obvious signs of bleeding. No abnormalities on peripheral smear. Reviewed by Dr. Palumbo. Will hold off on consultation at the moment.    Elevated bilirubin  Patient has a history of cirrhosis   - Total Bilirubin 15.8  - GI consulted  - Will follow GI recs    11/26/2020  Triple phase CT pending  Will check direct and indirect bilirubin    11/27/2020  Bilirubin is trending down    11/28/2020  Total bilirubin continues to trend down. Direct bilirubin only 5 in setting of total bilirubin of 8. Patient hemolysis is likely improving.        Acute exacerbation of CHF (congestive heart failure)  -BNP 3105  -Diuresed  in ED with IV lasix  -Continue IV lasix  -Daily weights  -Strict I&Os    11/27/2020  Repeat BNP today. Continue Lasix 40mg IV. May need additional dose today. Follow up CXR.   Repeat Echocardiogram tomorrow    11/28/2020  Continue to hold Lasix today. Give Albumin to assist with fluid shift. May have to resume Lasix in AM.     Acute pulmonary edema  -Admit to OBS  -CXR showed mild CHF/volume overload  -Echo performed 7/9/2020 showed an EF of 55% and diastolic dysfunction  -BNP elevated  -Diuresed in ED with x1 dose of IV Lasix  -Continue IV Lasix  -Daily weights  -Strict I&O's    11/27/2020  Responding well to diuretics. Repeat CXR today    11/28/2020  CXR shows improvement today      VTE Risk Mitigation (From admission, onward)         Ordered     IP VTE HIGH RISK PATIENT  Once      11/25/20 1608     Place sequential compression device  Until discontinued      11/25/20 1608                Discharge Planning   FINN:      Code Status: Full Code   Is the patient medically ready for discharge?:     Reason for patient still in hospital (select all that apply): Treatment  Discharge Plan A: Home with family            Jacob Campos NP  Department of Hospital Medicine   [facility]

## 2020-11-28 NOTE — ASSESSMENT & PLAN NOTE
-- abnormal haptoglobin (<10), LDH(317), and reticulocytes (3) consistent with hemolytic anemia. 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   --expected with improve with cessation of alcohol  --treat symptoms  --non autoimmunie so steroid therapy is not warranted  --s/p one unit of PRBC on 11/25. Will give 2 additional units today given 6.3 hemologlobin  --will check unconjugated bilirubin and lipid panel as this is also elevated in Zieve Syndrome.   --no other signs of GI loss    11/27/2020  S/p 2 units PRBCs yesterday. Hemoglobin up to 8.3    11/28/2020  Unable to obtain indirect/direct bilirubin during setting of active hemolysis. Today's direct bilirubin down to 5, but total bilirubin has significantly improved to 8 today. No obvious signs of bleeding. No abnormalities on peripheral smear. Reviewed by Dr. Palumbo. Will hold off on consultation at the moment.

## 2020-11-29 VITALS
SYSTOLIC BLOOD PRESSURE: 155 MMHG | HEART RATE: 61 BPM | TEMPERATURE: 98 F | OXYGEN SATURATION: 97 % | HEIGHT: 66 IN | BODY MASS INDEX: 29.37 KG/M2 | WEIGHT: 182.75 LBS | DIASTOLIC BLOOD PRESSURE: 68 MMHG | RESPIRATION RATE: 18 BRPM

## 2020-11-29 LAB
ALBUMIN SERPL BCP-MCNC: 2.9 G/DL (ref 3.5–5.2)
ALP SERPL-CCNC: 219 U/L (ref 55–135)
ALT SERPL W/O P-5'-P-CCNC: 26 U/L (ref 10–44)
ANION GAP SERPL CALC-SCNC: 12 MMOL/L (ref 8–16)
AST SERPL-CCNC: 80 U/L (ref 10–40)
BASOPHILS # BLD AUTO: 0.03 K/UL (ref 0–0.2)
BASOPHILS NFR BLD: 0.6 % (ref 0–1.9)
BILIRUB SERPL-MCNC: 7.6 MG/DL (ref 0.1–1)
BUN SERPL-MCNC: 38 MG/DL (ref 6–20)
CALCIUM SERPL-MCNC: 9 MG/DL (ref 8.7–10.5)
CHLORIDE SERPL-SCNC: 103 MMOL/L (ref 95–110)
CO2 SERPL-SCNC: 21 MMOL/L (ref 23–29)
CREAT SERPL-MCNC: 1.7 MG/DL (ref 0.5–1.4)
DIFFERENTIAL METHOD: ABNORMAL
EOSINOPHIL # BLD AUTO: 0.3 K/UL (ref 0–0.5)
EOSINOPHIL NFR BLD: 5.8 % (ref 0–8)
ERYTHROCYTE [DISTWIDTH] IN BLOOD BY AUTOMATED COUNT: 18.3 % (ref 11.5–14.5)
EST. GFR  (AFRICAN AMERICAN): 42 ML/MIN/1.73 M^2
EST. GFR  (NON AFRICAN AMERICAN): 36 ML/MIN/1.73 M^2
GLUCOSE SERPL-MCNC: 92 MG/DL (ref 70–110)
HCT VFR BLD AUTO: 24.8 % (ref 37–48.5)
HGB BLD-MCNC: 8.5 G/DL (ref 12–16)
IMM GRANULOCYTES # BLD AUTO: 0.01 K/UL (ref 0–0.04)
IMM GRANULOCYTES NFR BLD AUTO: 0.2 % (ref 0–0.5)
INR PPP: 1.8 (ref 0.8–1.2)
LYMPHOCYTES # BLD AUTO: 0.8 K/UL (ref 1–4.8)
LYMPHOCYTES NFR BLD: 17.3 % (ref 18–48)
MCH RBC QN AUTO: 31.8 PG (ref 27–31)
MCHC RBC AUTO-ENTMCNC: 34.3 G/DL (ref 32–36)
MCV RBC AUTO: 93 FL (ref 82–98)
MONOCYTES # BLD AUTO: 0.6 K/UL (ref 0.3–1)
MONOCYTES NFR BLD: 13.4 % (ref 4–15)
NEUTROPHILS # BLD AUTO: 2.9 K/UL (ref 1.8–7.7)
NEUTROPHILS NFR BLD: 62.7 % (ref 38–73)
NRBC BLD-RTO: 0 /100 WBC
PLATELET # BLD AUTO: 42 K/UL (ref 150–350)
PMV BLD AUTO: 10.9 FL (ref 9.2–12.9)
POTASSIUM SERPL-SCNC: 3.6 MMOL/L (ref 3.5–5.1)
PROT SERPL-MCNC: 8.9 G/DL (ref 6–8.4)
PROTHROMBIN TIME: 18.8 SEC (ref 9–12.5)
RBC # BLD AUTO: 2.67 M/UL (ref 4–5.4)
SODIUM SERPL-SCNC: 136 MMOL/L (ref 136–145)
WBC # BLD AUTO: 4.62 K/UL (ref 3.9–12.7)

## 2020-11-29 PROCEDURE — 85025 COMPLETE CBC W/AUTO DIFF WBC: CPT

## 2020-11-29 PROCEDURE — 99231 SBSQ HOSP IP/OBS SF/LOW 25: CPT | Mod: ,,, | Performed by: INTERNAL MEDICINE

## 2020-11-29 PROCEDURE — 94761 N-INVAS EAR/PLS OXIMETRY MLT: CPT

## 2020-11-29 PROCEDURE — 85610 PROTHROMBIN TIME: CPT

## 2020-11-29 PROCEDURE — 80053 COMPREHEN METABOLIC PANEL: CPT

## 2020-11-29 PROCEDURE — 63600175 PHARM REV CODE 636 W HCPCS: Performed by: INTERNAL MEDICINE

## 2020-11-29 PROCEDURE — 36415 COLL VENOUS BLD VENIPUNCTURE: CPT

## 2020-11-29 PROCEDURE — 25000003 PHARM REV CODE 250: Performed by: NURSE PRACTITIONER

## 2020-11-29 PROCEDURE — S4991 NICOTINE PATCH NONLEGEND: HCPCS | Performed by: NURSE PRACTITIONER

## 2020-11-29 PROCEDURE — 99231 PR SUBSEQUENT HOSPITAL CARE,LEVL I: ICD-10-PCS | Mod: ,,, | Performed by: INTERNAL MEDICINE

## 2020-11-29 PROCEDURE — 25000003 PHARM REV CODE 250: Performed by: INTERNAL MEDICINE

## 2020-11-29 RX ORDER — FUROSEMIDE 20 MG/1
20 TABLET ORAL 2 TIMES DAILY
Qty: 60 TABLET | Refills: 1 | Status: SHIPPED | OUTPATIENT
Start: 2020-11-29 | End: 2021-11-29

## 2020-11-29 RX ADMIN — FOLIC ACID 1000 MCG: 1 TABLET ORAL at 09:11

## 2020-11-29 RX ADMIN — PROPRANOLOL HYDROCHLORIDE 20 MG: 20 TABLET ORAL at 09:11

## 2020-11-29 RX ADMIN — SERTRALINE HYDROCHLORIDE 25 MG: 25 TABLET ORAL at 09:11

## 2020-11-29 RX ADMIN — PHYTONADIONE 10 MG: 10 INJECTION, EMULSION INTRAMUSCULAR; INTRAVENOUS; SUBCUTANEOUS at 12:11

## 2020-11-29 RX ADMIN — Medication 1 PATCH: at 09:11

## 2020-11-29 RX ADMIN — Medication 100 MG: at 09:11

## 2020-11-29 NOTE — PLAN OF CARE
Discussed Plan of Care with patient and verbalized understanding - Patient remains AAOx4 - remains free of falls, accidents and trauma during the day shift. Bed is in the low position and the call light is within reach. Patient received 1 unit PRBCs and 50Gm Albumin, tolerated well. CT Abd canceled D/T poor kidney function. Will continue to monitor

## 2020-11-29 NOTE — PROGRESS NOTES
[facility]   Gastroenterology  Progress Note    Patient Name: John Wray  MRN: [id number]  Admission Date: 11/25/2020  Hospital Length of Stay: 4 days  Code Status: Full Code   Attending Provider: Yonas Red MD  Consulting Provider: Rosanna Pablo MD  Primary Care Physician: Primary Doctor No  Principal Problem: Alcoholic cirrhosis of liver with ascites    Subjective:     Interval History: no events. Labs appear stable. She ambulated yesterday and tolerated PO. Understands importance of not drinking alcohol.     Review of Systems  Objective:     Vital Signs (Most Recent):  Temp: 97.5 °F (36.4 °C) (11/29/20 0750)  Pulse: 66 (11/29/20 0750)  Resp: 20 (11/29/20 0750)  BP: (!) 164/73 (11/29/20 0750)  SpO2: 98 % (11/29/20 0843) Vital Signs (24h Range):  Temp:  [96.6 °F (35.9 °C)-98.6 °F (37 °C)] 97.5 °F (36.4 °C)  Pulse:  [62-67] 66  Resp:  [18-22] 20  SpO2:  [92 %-98 %] 98 %  BP: (145-173)/(63-80) 164/73     Weight: 82.9 kg (182 lb 12.2 oz) (11/29/20 0500)  Body mass index is 29.5 kg/m².      Intake/Output Summary (Last 24 hours) at 11/29/2020 1058  Last data filed at 11/29/2020 0500  Gross per 24 hour   Intake 1847.67 ml   Output --   Net 1847.67 ml       Lines/Drains/Airways     Peripheral Intravenous Line                 Peripheral IV - Single Lumen 11/25/20 20 G Left Hand 4 days         Peripheral IV - Single Lumen 11/25/20 1509 18 G Right Forearm 3 days                Physical Exam  Vitals signs and nursing note reviewed.   Neurological:      General: No focal deficit present.      Mental Status: She is alert and oriented to person, place, and time. Mental status is at baseline.   Psychiatric:         Attention and Perception: Attention normal.         Mood and Affect: Mood normal.         Speech: Speech normal.         Behavior: Behavior normal. Behavior is cooperative.         Thought Content: Thought content normal.         Cognition and Memory: Cognition normal.         Judgment:  Judgment normal.         Significant Labs:  CBC:   Recent Labs   Lab 11/27/20  1502 11/28/20  0639 11/29/20  0746   WBC  --  4.89 4.62   HGB  --  7.7* 8.5*   HCT 22.7* 22.2* 24.8*   PLT  --  45* 42*     CMP:   Recent Labs   Lab 11/29/20  0746   GLU 92   CALCIUM 9.0   ALBUMIN 2.9*   PROT 8.9*      K 3.6   CO2 21*      BUN 38*   CREATININE 1.7*   ALKPHOS 219*   ALT 26   AST 80*   BILITOT 7.6*     Coagulation:   Recent Labs   Lab 11/29/20  0746   INR 1.8*         Significant Imaging:  Imaging results within the past 24 hours have been reviewed.    Assessment/Plan:     Active Diagnoses:    Diagnosis Date Noted POA    PRINCIPAL PROBLEM:  Alcoholic cirrhosis of liver with ascites [K70.31] 11/25/2020 Yes    Renal injury [S37.009A] 11/26/2020 No    Elevated troponin [R77.8] 11/26/2020 Yes    Thrombocytopenia [D69.6] 11/26/2020 Yes    Acute pulmonary edema [J81.0] 11/25/2020 Yes    Acute exacerbation of CHF (congestive heart failure) [I50.9] 11/25/2020 Yes    Elevated bilirubin [R17] 11/25/2020 Yes    Anemia [D64.9] 11/25/2020 Yes      Problems Resolved During this Admission:       A: 44 y.o female with acute CHF and decompensated alcohol cirrhosis with ascites complicated by non-immune hemolytic anemia     1. Alcohol cirrhosis of liver with ascites    MELD-Na score: 26 at 11/29/2020  7:46 AM  MELD score: 26 at 11/29/2020  7:46 AM  Calculated from:  Serum Creatinine: 1.7 mg/dL at 11/29/2020  7:46 AM  Serum Sodium: 136 mmol/L at 11/29/2020  7:46 AM  Total Bilirubin: 7.6 mg/dL at 11/29/2020  7:46 AM  INR(ratio): 1.8 at 11/29/2020  7:46 AM  Age: 44 years 11 months  - decompensated  - not OLTx candidate due to active alcohol use     2. Anemia  - suspect non-immune hemolytic anemia  - tom negative  - s/p 4U pRBCs since admission 11/25  - low concern for GI bleed, continue beta-blocker  - goal to keep Hgb 8     3. Acute CHF  - primary issue for this admission  - diuresis limited by renal function  - was on  Lasix 40mg IV BID until rise in creatinine  - crea 1.7  - unable to obtain echo until she is euvolemic        Recommendations:  1. Ok to discharge home  2. No alcohol  3. Will arrange GI follow up at [facility] with Dr. Spicer    Discussed with Dr. Red, \[facility]\"" medicine. Patient appears to have stabilized from her anemia and her kidney function is a better with albumin support. She needs close follow up. Unfortunately her echo and CT scan could not be performed with her SUNG. Educated patient on low salt diet and abstaining from alcohol. I will sign off. Please contact us if you have any additional questions.    Rosanna Pablo MD  Gastroenterology  [facility]

## 2020-11-29 NOTE — DISCHARGE SUMMARY
[facility] Hospital Medicine  Discharge Summary      Patient Name: John Wray  MRN: [id number]  Admission Date: 11/25/2020  Hospital Length of Stay: 4 days  Discharge Date and Time:  11/29/2020 2:39 PM  Attending Physician: Yonas Red MD   Discharging Provider: Jacob Campos NP  Primary Care Provider: Primary Doctor No      HPI:   Ms Wray is a 44 y.o. female patient with a PMHx of cirrhosis, HTN who presents to the ED endorsing for SOB which started last night. She states the SOB worsens when she lays flat but at her baseline is able to do so. Patient denies any fever, chills, n/v/d, blood in stool, CP, weakness, numbness, dizziness, headache, and all other sxs at this time. Pt reports non compliance and states that she did not take her BP medication this morning, and has not taken her Lasix in several days.   ED workup:  BNP 3105, BUN 22, Cr 1.1, Bilirubin 15.8, Alkaline Phosphatase 192, , UA, H/H: 6.1/18.5. CXR showed: mild congestive heart failure/volume overload. Worse since 07/12/2020. x1 unit of PRBC transfused for anemia. GI consulted due to concern of cirrhosis. Occult stool pending. She received IV hydralazine and Lasix 80mg IV x1 dose.  contacted for symptom management. Patient will admitted under  and placed under observation.   Patient is a Full Code and her SDM is her daughter Kalee Brooke: [phone]    * No surgery found *      Hospital Course:   11/25: Ms Wray is a 42 yo F with phmx of chirrosis, HTN, and CHF who presented to the ED endorsing SOB. Reports noncompliance with medications. Bilirubin, BNP, and AST elevated. GI consulted. CXR showed mild CHF and volume overload. Diuresed with Lasix 80mg IV in the ED. Admitted to  OBS for symptom management.     As of 11/26/20 haptoglobin and reticulocytosis are most consistent with hemolytic anemia. 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 Will check direct and indirect bilirubin as  well as lipid panel. Triple phase CT is pending to further evaluate liver. Hemoglobin today 6.3 following one unit of blood (likely from hemolytic anemia). No obvious signs of GI blood loss. GI is following. Will transfuse 2 units PRBCs today. Gastoenterology service is following. Worsening renal failure likely due to worsening anemia. Will repeat BMP in AM following 2 units. Thrombocytopenia noted. No need to transfuse. Will continue to diurese and monitor response.     As of 11/27/20, Hemoglobin is 8.3g/dL following 2 units. She has worsening kidney function today, likely from heart failure. Urine studies have been ordered. Continue diuresis for now. Will repeat CXR today.     11/28/2020, CXR shows improvement in heart failure. BNP trending down. Hemoglobin 7.7 today. Will transfuse one unit to keep Hgb>8. Anemia and thrombocytopenia discussed with Dr. Palumbo. 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 Direct and indirect bilirubin are pending. No other management for Zieve syndrome besides transfusion and alcohol cessation. No signs of bleeding. No abnormal  11/29/2020 kidney function continues to trend down. Hemoglobin is now stable. Okay to discharge per GI. She was counseled on ETOH cessation. She was asked to monitor fluid and sodium intake. She will follow up with [facility] in one week. She will continue Lasix daily.      Consults:   Consults (From admission, onward)        Status Ordering Provider     Inpatient consult to Gastroenterology  Once     Provider:  Rosanna Pablo MD    Completed KAYLAN PHELPS     Inpatient consult to Nephrology  Once     Provider:  Perez Dao MD    Completed SHAN KATZ          No new Assessment & Plan notes have been filed under this hospital service since the last note was generated.  Service: Hospital Medicine    Final Active Diagnoses:    Diagnosis Date Noted POA    PRINCIPAL PROBLEM:  Alcoholic cirrhosis of liver  with ascites [K70.31] 11/25/2020 Yes    Renal injury [S37.009A] 11/26/2020 No    Elevated troponin [R77.8] 11/26/2020 Yes    Thrombocytopenia [D69.6] 11/26/2020 Yes    Acute pulmonary edema [J81.0] 11/25/2020 Yes    Acute exacerbation of CHF (congestive heart failure) [I50.9] 11/25/2020 Yes    Elevated bilirubin [R17] 11/25/2020 Yes    Anemia [D64.9] 11/25/2020 Yes      Problems Resolved During this Admission:       Discharged Condition: stable    Disposition: Home or Self Care    Follow Up:  Follow-up Information     Edwige Spicer MD.    Specialties: Gastroenterology, Hepatology  Contact information:  [address]  [place] [address] [phone]                 Patient Instructions:   No discharge procedures on file.    Significant Diagnostic Studies: Labs:   CMP   Recent Labs   Lab 11/28/20  0639 11/28/20  1503 11/29/20  0746    138 136   K 3.5 3.5 3.6    101 103   CO2 21* 23 21*   GLU 99 109 92   BUN 40* 37* 38*   CREATININE 1.9* 1.8* 1.7*   CALCIUM 8.9 9.0 9.0   PROT 8.9*  --  8.9*   ALBUMIN 2.4*  --  2.9*   BILITOT 8.7*  --  7.6*   ALKPHOS 205*  --  219*   AST 94*  --  80*   ALT 30  --  26   ANIONGAP 12 14 12   ESTGFRAFRICA 37* 39* 42*   EGFRNONAA 32* 34* 36*    and CBC   Recent Labs   Lab 11/28/20  0639 11/29/20  0746   WBC 4.89 4.62   HGB 7.7* 8.5*   HCT 22.2* 24.8*   PLT 45* 42*       Pending Diagnostic Studies:     None         Medications:  Reconciled Home Medications:      Medication List      CONTINUE taking these medications    folic acid 1 MG tablet  Commonly known as: FOLVITE  TAKE 1 TABLET BY MOUTH ONCE DAILY     furosemide 20 MG tablet  Commonly known as: LASIX  Take 1 tablet (20 mg total) by mouth 2 (two) times daily.     multivit-iron-FA-calcium-mins 9 mg iron-400 mcg Tab tablet  Take 1 tablet by mouth once daily.     pantoprazole 40 MG tablet  Commonly known as: PROTONIX  Take 40 mg by mouth once daily.     propranoloL 20 MG tablet  Commonly known as: INDERAL  Take 1  tablet (20 mg total) by mouth 2 (two) times daily.     sertraline 25 MG tablet  Commonly known as: ZOLOFT  Take 25 mg by mouth once daily.     spironolactone 50 MG tablet  Commonly known as: ALDACTONE  Take 1 tablet (50 mg total) by mouth once daily.     thiamine 100 MG tablet  Take 1 tablet (100 mg total) by mouth once daily.            Indwelling Lines/Drains at time of discharge:   Lines/Drains/Airways     None                 Time spent on the discharge of patient >35 minutes  Patient was seen and examined on the date of discharge and determined to be suitable for discharge.         Jacob Campos NP  Department of Hospital Medicine  [facility]

## 2020-11-29 NOTE — PROGRESS NOTES
Discharge instructions given to patient, verbalized understanding - IV removed without difficulty, pressure dressing applied to site - cardiac monitor removed and returned to monitor tech - patient to car via wheelchair wearing face mask, without distress noted.

## 2020-11-29 NOTE — PLAN OF CARE
11/29/20 1130   Final Note   Assessment Type Final Discharge Note   Anticipated Discharge Disposition Home   Right Care Referral Info   Post Acute Recommendation No Care

## 2020-11-30 ENCOUNTER — PATIENT OUTREACH (OUTPATIENT)
Dept: ADMINISTRATIVE | Facility: CLINIC | Age: 45
End: 2020-11-30

## 2020-11-30 NOTE — PROGRESS NOTES
C3 nurse attempted to contact patient. No answer. The following message was left for the patient to return the call:  Good afternoon, I am a nurse calling on behalf of [facility] from the [facility].  This is a Transitional Care Call for John Wray. When you have a moment please contact us at [phone] or [phone] Monday through Friday, between the hours of 8 am to 4 pm. We look forward to speaking with you. On behalf of [facility] have a nice day.    The patient does not have a scheduled HOSFU appointment within 7-14 days post hospital discharge date 11/29.

## 2020-12-01 ENCOUNTER — NURSE TRIAGE (OUTPATIENT)
Dept: ADMINISTRATIVE | Facility: CLINIC | Age: 45
End: 2020-12-01

## 2020-12-01 NOTE — PATIENT INSTRUCTIONS
Discharge Instructions for Cirrhosis of the Liver  You have been diagnosed with cirrhosis of the liver. This is a long-term (chronic) problem. It occurs when liver tissue is destroyed and replaced by scar tissue. Causes of cirrhosis include:  · Infection such as viral hepatitis  · Chronic alcoholism  · The bodys immune system attacks healthy cells (autoimmune disorders)  · Obesity  · Medicine side effects  · Genetic diseases  Sometimes the exact cause is unknown. You may not have any symptoms at first. Or your symptoms may be mild. But they usually get worse. Cirrhosis is likely to occur if you have a history of long-term alcohol abuse. Cirrhosis cant be cured. But it can be treated.   Home care  Alcohol  · People with liver disease should not drink alcohol. If you stop drinking, you may feel better and live longer.  · If you are a chronic alcohol user, you will have withdrawal symptoms. Talk with your healthcare provider for more information.    · If alcohol is a problem, ask your provider about medicine that can help you quit drinking.    · Find a local Alcoholics Anonymous support group online at www.aa.org.  Diet  · Ask your provider what kind of diet you should follow. You may be asked to limit or not eat certain foods. Do not limit your protein intake.  · Weigh yourself daily and keep a weight log. If you have a sudden change in weight, call your provider.  · Cut back on salt:  ¨ Limit canned, dried, packaged, and fast foods.  ¨ Dont add salt to your food at the table.  ¨ Season foods with herbs instead of salt when you cook.  Medicines, supplements, and vaccines  · Take your medicines exactly as directed.  · Talk to your provider before taking vitamins, over-the-counter medicines, or herbal supplements. Many herbal supplements may be poisonous (toxic) to the liver.  · Avoid aspirin and other blood-thinning medicines.  · Discuss vitamin supplements and deficiencies with your provider.  · Ask your provider  about getting vaccinations for viruses that can cause liver diseases.  Follow-up care  Follow up with your healthcare provider, or as advised. You will likely have the following tests:  · Lab tests  · Blood tests for liver cancer  · Ultrasound of your liver every 6 months  · Endoscopy to check for swollen veins (varices) in your digestive tract  When to call your provider  Call your healthcare provider right away if you have any of the following:  · Fever of 100.4°F (38.0°C) or higher  · Extreme tiredness (fatigue), weakness, or lack of appetite  · Vomiting (with or without blood)  · Yellowing of your skin or eyes (jaundice)  · Itching  · Swelling in your belly or legs  · Black or tarry stools  · Skin that bruises easily  · Confusion or trouble thinking clearly   Date Last Reviewed: 8/1/2016  © 2619-0658 [employer]. [address] [place] [address], [place] [address]. All rights reserved. This information is not intended as a substitute for professional medical care. Always follow your healthcare professional's instructions.

## 2020-12-03 NOTE — PHYSICIAN QUERY
PT Name: John Wray  MR #: [id number]     RENAL CONDITION CLARIFICATION     CDS Kylie Agosto RN, BSN        Contact Information:    [email or web address]         This form is a permanent document in the medical record.    Query Date: December 3, 2020    By submitting this query, we are merely seeking further clarification of documentation.  Please utilize your independent clinical judgment when addressing the question(s) below.    The Medical Record contains the following:   Indicator Supporting Clinical Findings Location in Medical Record    Kidney (Renal) Insufficiency     X   Kidney (Renal) Failure/Injury Renal injury  1.1>1.7. could be related to worsening anemia and CHF  --treat anemia    11/27/2020  Worsening renal function today. Up to 2.1. suspect from CHF. Will continue diuresis. Urine studies. Repeat today. If worsening, will consult Nephrology 11/26 HM PN           11/27 HM PN     Nephrotoxic Agents     X   BUN/Creatinine GFR    11/25/2020 13:19 11/26/2020 08:45 11/27/2020 05:12 11/28/2020 06:39 11/28/2020 15:03 11/29/2020 07:46   BUN 22 (H) 33 (H) 38 (H) 40 (H) 37 (H) 38 (H)   Creatinine 1.1 1.7 (H) 2.1 (H) 1.9 (H) 1.8 (H) 1.7 (H)        11/25/2020 13:19 11/26/2020 08:45 11/27/2020 05:12 11/28/2020 06:39 11/28/2020 15:03 11/29/2020 07:46   eGFR if  >60 42 (A) 33 (A) 37 (A) 39 (A) 42 (A)    Labs    X   Urine: Casts         Eosinophils    11/25/2020 13:31   Specimen UA Urine, Catheterized   Color, UA Yellow   Appearance, UA Clear   Specific Gravity, UA 1.015   pH, UA 7.0   Protein, UA Negative   Glucose, UA Negative   Ketones, UA Trace (A)   Occult Blood UA Trace (A)   NITRITE UA Negative   UROBILINOGEN UA 1.0   Bilirubin (UA) Negative   Leukocytes, UA Negative    11/25 UA    Dehydration      Nausea/Vomiting      Dialysis/CRRT      Treatment:      Other:        Acute Kidney Injury/Acute Renal Failure has different defining criteria. A generally accepted guideline is:   A greater than  100% (2X) rise in serum creatinine from baseline* occurring during the course of a single hospital stay.   *Baseline as determined by the providers judgment and consideration of previous lab values and other documentation, if available.    A diagnosis of Acute Kidney Injury/Acute Renal Failure should incorporate abnormal labs and clinical findings that are clinically significant.      The clinical guidelines noted above are only a system guideline. It does not replace the providers clinical judgment.     Provider, please specify the diagnosis or diagnoses associated with above clinical findings. Check all that apply     [    ] Unspecified Acute Kidney Failure/Injury       [    ] Other Acute Kidney Failure/Injury (please specify): ____________       [    ] Chronic Kidney Disease (CKD) stage 3b - Moderately to severely decreased eGFR 30-44     [    ] Chronic Kidney Disease (CKD) stage 3 unspecified     [  ] Clinically Undetermined       Present on admission (POA) status:  [    ] Yes (Y)   [    ] No (N)   [    ] Documentation insufficient to determine if condition is POA (U)   [   ] Clinically Undetermined (W)          Please document in your progress notes daily for the duration of treatment until resolved and include in your discharge summary.    References:     FRANK Hernandez., BRYAN Robins, STEPHANIE Patel. et al. Acute renal failure - definition, outcome measures, animal models, fluid therapy and information technology needs: the Second International Consensus Conference of the Acute Dialysis Quality Initiative (ADQI) Group. Crit Care 8, R204 (2004). https://doi.org/10.1186/ck2131    KDIGO Clinical Practice Guideline for Acute Kidney Injury. (2012, March). Retrieved October 21, 2020, from https://kdigo.org/wp-content/uploads/2016/10/MQXON-4091-YUN-Guideline-English.pdf    IRAIDA Avelar MD, HILARIO Jackson MD, & FLOWER Singh MD. (1960). Renal medullary necrosis [Abstract]. The American Journal of Medicine, 29(1), 132-156.  doi:https://doi.org/10.1016/4727-4515(17)39796-5    FLOWER Vinson MD, & RIA Solis MD, MS. (2020, June 18). Definition and staging of chronic kidney disease in adults ([id number] HILARIO Morgan MD, ScD & [id number] BRIAN Almazan MD, MSc, Eds.). Retrieved October 21, 2020, from https://www.Naymit/contents/definition-and-staging-of-chronic-kidney-disease-in-adults?search=ckd%20staging&source=search_result&selectedTitle=1~150&usage_type=default&display_rank=1    MARITZA Grey., MANUEL Patel, Kareem S.V. et al. Acute Kidney Injury Network: report of an initiative to improve outcomes in acute kidney injury. Crit Care 11, R31 (2007). https://doi.org/10.1186/dg8147    GARFIELD Remy MD, FACP. (2015, Breana 15). Acute kidney injury revisited. Retrieved October 21, 2020, from https://acphospitalist.org/archives/2015/06/coding-acute-kidney-injury.htm    ELVIA Strauss MD. (2019, July). Renal Cortical Necrosis. Retrieved October 21, 2020, from https://www.Axxana/professional/genitourinary-disorders/renovascular-disorders/renal-cortical-necrosis    Form No. 16452

## 2020-12-03 NOTE — PHYSICIAN QUERY
"PT Name: John Wray  MR #: [id number]    Physician Query Form - Heart  Condition Clarification     CDS Kylie Agosto RN, BSN        Contact Information:    [email or web address]         This form is a permanent document in the medical record.     Query Date: December 3, 2020    By submitting this query, we are merely seeking further clarification of documentation. Please utilize your independent clinical judgment when addressing the question(s) below.    The medical record contains the following   Indicators     Supporting Clinical Findings Location in Medical Record   X   BNP    11/25/2020 13:19 11/27/2020 10:58 11/28/2020 06:39   BNP 3,105 (H) 1,814 (H) 1,614 (H)    Labs   X   EF The estimated ejection fraction is 55%. 7/9/2020 ECHO   X   Radiology findings CXR showed mild CHF/volume overload 11/25 H&P   X   Echo Results · Severe concentric left ventricular hypertrophy.  · Normal left ventricular systolic function. The estimated ejection fraction is 55%.  · Indeterminate left ventricular diastolic function.  · Mild tricuspid regurgitation.  · Septal wall has abnormal motion. Systolic flattening of the interventricular septum consistent with right ventricle pressure overload.  · Severe left atrial enlargement.  · Normal central venous pressure (3 mmHg).  · The estimated PA systolic pressure is 54 mmHg.  · Pulmonary hypertension present. 7/9/2020 ECHO    "Ascites" documented     X   "SOB" or "FORTE" documented 44 y.o. female patient with a PMHx of cirrhosis, HTN who presents to the ED endorsing for SOB   She states the SOB worsens when she lays flat but at her baseline is able to do so.    Examination of the right-lower field reveals decreased breath sounds. Examination of the left-lower field reveals decreased breath sounds. Decreased breath sounds present. No wheezing.  11/25 H&P     "Hypoxia" documented     X   Heart Failure documented Acute exacerbation of CHF (congestive heart failure) 11/25 H&P    "Edema" " documented     X   Diuretics/Meds -Diuresed in ED with IV lasix -- She received IV hydralazine and Lasix 80mg IV x1 dose.   -Continue IV lasix 11/25 H&P     Treatment:     X   Other:  . Pt reports non compliance and states that she did not take her BP medication this morning, and has not taken her Lasix in several days.  11/25 H&P   Heart failure (HF) can be acute, chronic or both. It is generally further specificed as systolic, diastolic, or combined. Lastly, it is important to identify an underlying etiology if known or suspected.     Common clues to acute exacerbation:  Rapidly progressive symptoms (w/in 2 weeks of presentation), using IV diuretics to treat, using supplemental O2, pulmonary edema on Xray, MI w/in 4 weeks, and/or BNP >500    Systolic Heart Failure: is defined as chart documentation of a left ventricular ejection fraction (LVEF) less than 40%     Diastolic Heart Failure: is defined as a left ventricular ejection fraction (LVEF) greater than 40%   +      Evidence of diastolic dysfunction on echocardiography OR    Right heart catheterization wedge pressure above 12 mm Hg OR    Left heart catheterization left ventricular end diastolic pressure 18 mm Hg or above.    References: *American Heart Association    The clinical guidelines noted below are only system guidelines, and do not replace the providers clinical judgment.     Provider, please specify the diagnosis associated with above clinical findings    [  x ] Acute on Chronic Diastolic Heart Failure -    Pre-existing diastoic HF diagnosis.  EF > 40%  and acute HF symptoms documented         [   ] Other Type of Heart Failure (please specify type):     [   ] Other (please specify):     [  ] Clinically Undetermined                           Please document in your progress notes daily for the duration of treatment until resolved and include in your discharge summary.

## 2020-12-04 NOTE — PHYSICIAN QUERY
PT Name: John Wray  MR #: [id number]     RENAL CONDITION CLARIFICATION     CDS Kylie Agosto RN, BSN        Contact Information:    [email or web address]         This form is a permanent document in the medical record.    Query Date: December 4, 2020    By submitting this query, we are merely seeking further clarification of documentation.  Please utilize your independent clinical judgment when addressing the question(s) below.    The Medical Record contains the following:   Indicator Supporting Clinical Findings Location in Medical Record    Kidney (Renal) Insufficiency     X   Kidney (Renal) Failure/Injury Renal injury  1.1>1.7. could be related to worsening anemia and CHF  --treat anemia     11/27/2020  Worsening renal function today. Up to 2.1. suspect from CHF. Will continue diuresis. Urine studies. Repeat today. If worsening, will consult Nephrology 11/26 HM PN         11/27 HM PN     Nephrotoxic Agents     X   BUN/Creatinine GFR  11/25/2020 13:19 11/26/2020 08:45 11/27/2020 05:12 11/28/2020 06:39 11/28/2020 15:03 11/29/2020 07:46   BUN 22 (H) 33 (H) 38 (H) 40 (H) 37 (H) 38 (H)   Creatinine 1.1 1.7 (H) 2.1 (H) 1.9 (H) 1.8 (H) 1.7 (H)           11/25/2020 13:19 11/26/2020 08:45 11/27/2020 05:12 11/28/2020 06:39 11/28/2020 15:03 11/29/2020 07:46   eGFR if  >60 42 (A) 33 (A) 37 (A) 39 (A) 42 (A)      Labs   X   Urine: Casts         Eosinophils  11/25/2020 13:31   Specimen UA Urine, Catheterized   Color, UA Yellow   Appearance, UA Clear   Specific Gravity, UA 1.015   pH, UA 7.0   Protein, UA Negative   Glucose, UA Negative   Ketones, UA Trace (A)   Occult Blood UA Trace (A)   NITRITE UA Negative   UROBILINOGEN UA 1.0   Bilirubin (UA) Negative   Leukocytes, UA Negative      11/25 UA    Dehydration      Nausea/Vomiting      Dialysis/CRRT      Treatment:     X   Other:  PRINCIPAL PROBLEM:  Alcoholic cirrhosis of liver with ascites   Acute exacerbation of CHF   Anemia  . 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  She has worsening kidney function today, likely from heart failure Discharge summary      Acute Kidney Injury/Acute Renal Failure has different defining criteria. A generally accepted guideline is:   A greater than 100% (2X) rise in serum creatinine from baseline* occurring during the course of a single hospital stay.   *Baseline as determined by the providers judgment and consideration of previous lab values and other documentation, if available.    A diagnosis of Acute Kidney Injury/Acute Renal Failure should incorporate abnormal labs and clinical findings that are clinically significant.       The clinical guidelines noted above are only a system guideline. It does not replace the providers clinical judgment     Provider, please         Clarify "renal injury"    And   Present on admission status                                                      associated with above clinical findings. Check all that apply     [    ] Unspecified Acute Kidney Failure/Injury       [    ] Other Acute Kidney Failure/Injury (please specify): ____________       [   x ] Chronic Kidney Disease (CKD) stage 3b - Moderately to severely decreased eGFR 30-44     [    ] Chronic Kidney Disease (CKD) stage 3 unspecified     [    ] Other (please specify): _______________________________     [  ] Clinically Undetermined       Present on admission (POA) status:  [    ] Yes (Y)   [    ] No (N)   [    ] Documentation insufficient to determine if condition is POA (U)   [   ] Clinically Undetermined (W)          Please document in your progress notes daily for the duration of treatment until resolved and include in your discharge summary.      "

## 2021-03-01 PROBLEM — J81.0 ACUTE PULMONARY EDEMA: Status: RESOLVED | Noted: 2020-11-25 | Resolved: 2021-03-01

## 2021-03-09 ENCOUNTER — TELEPHONE (OUTPATIENT)
Dept: HEPATOLOGY | Facility: CLINIC | Age: 46
End: 2021-03-09

## 2022-08-16 NOTE — HPI
Ms Wray is a 44 y.o. female patient with a PMHx of cirrhosis, HTN who presents to the ED endorsing for SOB which started last night. She states the SOB worsens when she lays flat but at her baseline is able to do so. Patient denies any fever, chills, n/v/d, blood in stool, CP, weakness, numbness, dizziness, headache, and all other sxs at this time. Pt reports non compliance and states that she did not take her BP medication this morning, and has not taken her Lasix in several days.   ED workup:  BNP 3105, BUN 22, Cr 1.1, Bilirubin 15.8, Alkaline Phosphatase 192, , UA, H/H: 6.1/18.5. CXR showed: mild congestive heart failure/volume overload. Worse since 07/12/2020. x1 unit of PRBC transfused for anemia. GI consulted due to concern of cirrhosis. Occult stool pending. She received IV hydralazine and Lasix 80mg IV x1 dose. HM contacted for symptom management. Patient will admitted under  and placed under observation.   Patient is a Full Code and her SDM is her daughter Kalee Cox: [phone]   none

## 2022-08-22 NOTE — SUBJECTIVE & OBJECTIVE
Encounter Date: 8/21/2022       History     Chief Complaint   Patient presents with    Asthma     Multiple attempts with breathing treatments and rescue inhaler with no relief     The history is provided by the patient.   Asthma  This is a new problem. The current episode started 6 to 12 hours ago. The problem occurs constantly. The problem has not changed since onset.Pertinent negatives include no chest pain, no abdominal pain, no headaches and no shortness of breath. The symptoms are aggravated by exertion. The symptoms are relieved by medications. Treatments tried: Nebs. The treatment provided mild relief.     Review of patient's allergies indicates:   Allergen Reactions    Peanut Swelling     All nuts    Nsaids (non-steroidal anti-inflammatory drug) Other (See Comments)     Stomach issues     Past Medical History:   Diagnosis Date    Acid reflux     Anxiety     severe    Asthma     Digestive disorder     Urgency incontinence     UTI (urinary tract infection)      Past Surgical History:   Procedure Laterality Date    APPENDECTOMY      CHOLECYSTECTOMY      DILATION OF URETHRA N/A 11/4/2021    Procedure: DILATION-URETHRA;  Surgeon: Christian Aleman MD;  Location: [facility];  Service: Urology;  Laterality: N/A;    ESOPHAGOGASTRODUODENOSCOPY      HYSTERECTOMY      TONSILLECTOMY       Family History   Problem Relation Age of Onset    Cancer Mother     Heart disease Father      Social History     Tobacco Use    Smoking status: Current Some Day Smoker     Packs/day: 0.25     Types: Cigarettes    Smokeless tobacco: Never Used   Substance Use Topics    Alcohol use: Yes     Comment: social    Drug use: Never     Review of Systems   Constitutional: Negative.  Negative for fever.   HENT: Negative.  Negative for congestion and rhinorrhea.    Respiratory: Positive for wheezing. Negative for shortness of breath.    Cardiovascular: Negative for chest pain.   Gastrointestinal: Negative for abdominal pain.  Past Medical History:   Diagnosis Date    Alcohol abuse     Cirrhosis     Depression     Hypertension     Portal hypertension with esophageal varices        Past Surgical History:   Procedure Laterality Date     SECTION      ESOPHAGOGASTRODUODENOSCOPY         Review of patient's allergies indicates:  No Known Allergies  Family History     None        Tobacco Use    Smoking status: Current Every Day Smoker     Packs/day: 0.50   Substance and Sexual Activity    Alcohol use: Not Currently    Drug use: Never    Sexual activity: Not on file     Review of Systems   Unable to perform ROS: Mental status change     Objective:     Vital Signs (Most Recent):  Temp: 98.3 °F (36.8 °C) (20)  Pulse: 99 (20)  Resp: (!) 25 (20)  BP: (!) 146/40 (20)  SpO2: 99 % (20) Vital Signs (24h Range):  Temp:  [97.9 °F (36.6 °C)-98.6 °F (37 °C)] 98.3 °F (36.8 °C)  Pulse:  [] 99  Resp:  [18-37] 25  SpO2:  [96 %-100 %] 99 %  BP: (122-167)/() 146/40     Weight: 84 kg (185 lb 1.6 oz) (20)  Body mass index is 28.99 kg/m².      Intake/Output Summary (Last 24 hours) at 2020 0849  Last data filed at 2020 0715  Gross per 24 hour   Intake 1099.37 ml   Output 155 ml   Net 944.37 ml       Lines/Drains/Airways     Drain                 Urethral Catheter 20 0130 Latex 16 Fr. less than 1 day          Peripheral Intravenous Line                 Peripheral IV - Single Lumen 20 1849 20 G Right Antecubital less than 1 day         Peripheral IV - Single Lumen 20 0055 18 G Left Antecubital less than 1 day                Physical Exam  Constitutional:       Appearance: Normal appearance. She is well-developed.   HENT:      Head: Normocephalic and atraumatic.   Neck:      Musculoskeletal: Normal range of motion and neck supple.      Vascular: No carotid bruit.   Cardiovascular:      Rate and Rhythm: Normal rate and regular rhythm.      Heart    Neurological: Negative for headaches.   All other systems reviewed and are negative.      Physical Exam     Initial Vitals [08/21/22 1916]   BP Pulse Resp Temp SpO2   129/68 70 19 98.1 °F (36.7 °C) 97 %      MAP       --         Physical Exam    Nursing note and vitals reviewed.  Constitutional: She appears well-developed and well-nourished. No distress.   HENT:   Head: Normocephalic and atraumatic.   Mouth/Throat: Oropharynx is clear and moist.   Eyes: Conjunctivae and EOM are normal. Pupils are equal, round, and reactive to light.   Neck: Neck supple.   Normal range of motion.  Cardiovascular: Normal rate, regular rhythm and normal heart sounds.   Pulmonary/Chest: No respiratory distress. She has wheezes.   Abdominal: Abdomen is soft. Bowel sounds are normal. She exhibits no distension. There is no abdominal tenderness.   Musculoskeletal:         General: Normal range of motion.      Cervical back: Normal range of motion and neck supple.     Neurological: She is alert and oriented to person, place, and time. She has normal strength.   Skin: Skin is warm and dry.   Psychiatric: She has a normal mood and affect. Thought content normal.         ED Course   Procedures  Labs Reviewed   CBC W/ AUTO DIFFERENTIAL   COMPREHENSIVE METABOLIC PANEL   SARS-COV-2 RDRP GENE    Narrative:     This test utilizes isothermal nucleic acid amplification   technology to detect the SARS-CoV-2 RdRp nucleic acid segment.   The analytical sensitivity (limit of detection) is 125 genome   equivalents/mL.   A POSITIVE result implies infection with the SARS-CoV-2 virus;   the patient is presumed to be contagious.     A NEGATIVE result means that SARS-CoV-2 nucleic acids are not   present above the limit of detection. A NEGATIVE result should be   treated as presumptive. It does not rule out the possibility of   COVID-19 and should not be the sole basis for treatment decisions.   If COVID-19 is strongly suspected based on clinical and  sounds: No murmur.   Pulmonary:      Effort: Pulmonary effort is normal. No respiratory distress.      Breath sounds: Normal breath sounds. No wheezing.   Abdominal:      General: Bowel sounds are normal. There is distension (but soft).      Palpations: There is no mass.      Tenderness: There is no abdominal tenderness.   Musculoskeletal:         General: No swelling.   Skin:     General: Skin is warm and dry.      Findings: No rash.   Neurological:      General: No focal deficit present.      Mental Status: She is alert.      Comments: She is awake but confused and talking to people not in the room. No asterixis.          Significant Labs:  CBC:   Recent Labs   Lab 07/09/20  0408   WBC 14.63*   HGB 3.8*   HCT 12.9*   PLT 49*     CMP:   Recent Labs   Lab 07/09/20  0408   GLU 93   CALCIUM 9.0   ALBUMIN 2.4*   PROT 9.2*      K 4.5   CO2 10*      BUN 25*   CREATININE 2.1*   ALKPHOS 135   ALT 42   *   BILITOT 14.9*     Coagulation:   Recent Labs   Lab 07/08/20  1852   INR 2.0*   APTT 34.7*       Significant Imaging:  Imaging results within the past 24 hours have been reviewed.   exposure   history, re-testing using an alternate molecular assay should be   considered.   This test is only for use under the Food and Drug   Administration s Emergency Use Authorization (EUA).   Commercial kits are provided by [employer].   Performance characteristics of the EUA have been independently   verified by [facility] Department of   Pathology and Laboratory Medicine.   _________________________________________________________________   The authorized Fact Sheet for Healthcare Providers and the authorized Fact   Sheet for Patients of the ID NOW COVID-19 are available on the FDA   website:     https://www.fda.gov/media/999289/download  https://www.fda.gov/media/355233/download            Results for orders placed or performed during the hospital encounter of 08/21/22   CBC Auto Differential   Result Value Ref Range    WBC 9.98 3.90 - 12.70 K/uL    RBC 4.24 4.00 - 5.40 M/uL    Hemoglobin 12.9 12.0 - 16.0 g/dL    Hematocrit 38.9 37.0 - 48.5 %    MCV 92 82 - 98 fL    MCH 30.4 27.0 - 31.0 pg    MCHC 33.2 32.0 - 36.0 g/dL    RDW 13.2 11.5 - 14.5 %    Platelets 347 150 - 450 K/uL    MPV 9.5 9.2 - 12.9 fL    Immature Granulocytes 0.3 0.0 - 0.5 %    Gran # (ANC) 4.4 1.8 - 7.7 K/uL    Immature Grans (Abs) 0.03 0.00 - 0.04 K/uL    Lymph # 4.3 1.0 - 4.8 K/uL    Mono # 0.8 0.3 - 1.0 K/uL    Eos # 0.4 0.0 - 0.5 K/uL    Baso # 0.09 0.00 - 0.20 K/uL    nRBC 0 0 /100 WBC    Gran % 44.0 38.0 - 73.0 %    Lymph % 42.7 18.0 - 48.0 %    Mono % 8.3 4.0 - 15.0 %    Eosinophil % 3.8 0.0 - 8.0 %    Basophil % 0.9 0.0 - 1.9 %    Differential Method Automated    Comprehensive Metabolic Panel   Result Value Ref Range    Sodium 141 136 - 145 mmol/L    Potassium 3.6 3.5 - 5.1 mmol/L    Chloride 106 95 - 110 mmol/L    CO2 23 23 - 29 mmol/L    Glucose 97 70 - 110 mg/dL    BUN 13 6 - 20 mg/dL    Creatinine 0.9 0.5 - 1.4 mg/dL    Calcium 9.6 8.7 - 10.5 mg/dL    Total Protein 7.4 6.0 - 8.4 g/dL    Albumin 3.9 3.5 - 5.2  g/dL    Total Bilirubin 0.3 0.1 - 1.0 mg/dL    Alkaline Phosphatase 71 55 - 135 U/L    AST 16 10 - 40 U/L    ALT 19 10 - 44 U/L    Anion Gap 12 8 - 16 mmol/L    eGFR >60.0 >60 mL/min/1.73 m^2   POCT COVID-19 Rapid Screening   Result Value Ref Range    POC Rapid COVID Negative Negative     Acceptable Yes        EKG Readings: (Independently Interpreted)   Initial Reading: No STEMI. Rhythm: Normal Sinus Rhythm. Heart Rate: 64. ST Segments: Non-Specific ST Segment Depression. T Waves: Normal. Axis: Left Axis Deviation. Clinical Impression: Normal Sinus Rhythm       Imaging Results          X-Ray Chest 1 View (Final result)  Result time 08/21/22 20:27:44    Final result by Shanna Danielle MD (08/21/22 20:27:44)                 Impression:      No acute abnormality.      Electronically signed by: Shashi Otero  Date:    08/21/2022  Time:    20:27             Narrative:    EXAMINATION:  XR CHEST 1 VIEW    CLINICAL HISTORY:  Wheezing    TECHNIQUE:  Single frontal view of the chest was performed.    COMPARISON:  None    FINDINGS:  The lungs are clear, with normal appearance of pulmonary vasculature and no pleural effusion or pneumothorax.    The cardiac silhouette is normal in size. The hilar and mediastinal contours are unremarkable.    Bones are intact.                            9:09 PM - Counseling: Spoke with the patient and discussed todays findings, in addition to providing specific details for the plan of care and counseling regarding the diagnosis and prognosis. Questions are answered at this time. Pt feels improved after nebs, discussed results and need for close follow up.       Medications   methylPREDNISolone sodium succinate injection 125 mg (125 mg Intravenous Given 8/21/22 1935)   albuterol-ipratropium 2.5 mg-0.5 mg/3 mL nebulizer solution 3 mL (3 mLs Nebulization Given 8/21/22 1950)                          Clinical Impression:   Final diagnoses:  [R06.2] Wheezing  [J45.901] Asthma  exacerbation (Primary)          ED Disposition Condition    Discharge Stable        ED Prescriptions     Medication Sig Dispense Start Date End Date Auth. Provider    predniSONE (DELTASONE) 20 MG tablet Take 3 tablets (60 mg total) by mouth once daily. for 5 days 15 tablet 8/21/2022 8/26/2022 Kamaljit Ortiz MD        Follow-up Information     Follow up With Specialties Details Why Contact Info    Eliseo Cuevas Jr., MD Family Medicine Call in 2 days  [address] [facility] [address]  [phone]      [facility] - Emergency Dept Emergency Medicine  If symptoms worsen [id number] [address] [address] [phone]           Kamaljit Ortiz MD  08/21/22 7807

## 2024-01-10 NOTE — HPI
The patient presented to the ER with weakness and SOB. The patient is currently confused and unable to provide any history. Her history has been obtained from the medical record and medical staff. Review of Care Everywhere shows a visit to [facility] on 08/27/19 for alcoholic cirrhosis with h/o esophageal varices, ascites and mild encephalopathy. She had positive HAV IgG and negative HCV RNA. HBV status unknown. 04/2019 US showed small ascites and CT scan showed diffuse fatty infiltration of liver with hepatosplenomegaly and enlarged gastric varices consistent with portal hypertension, and cholelithiasis. Baseline Hgb around 9. LFTs were similar to this admit with T. Bili over 10 and INR below 2.   This admit, work up revealed at Hgb of 3.8 after two units of blood. She is getting a third unit now. She also got a unit of FFP overnight. The nurse said the ER reported dark red stools overnight. No BM since getting to the unit. Her INR is 2.0. LFT elevated. CBC elevated. Afebrile. No significant tachycardia and BP has been primarily normal to elevated. UA unremarkable. CXR showed possible process on the right. Nurse reports no complaints of pain from patient and no vomiting. There are reports she is still drinking alcohol.    No

## 2024-11-08 NOTE — ASSESSMENT & PLAN NOTE
Initial h/h 6.1/18.5  x1 unit of PBRC transfused in the ED  Will continue to monitor.      mammogram